# Patient Record
Sex: MALE | Race: WHITE | Employment: FULL TIME | URBAN - METROPOLITAN AREA
[De-identification: names, ages, dates, MRNs, and addresses within clinical notes are randomized per-mention and may not be internally consistent; named-entity substitution may affect disease eponyms.]

---

## 2018-10-25 ENCOUNTER — HOSPITAL ENCOUNTER (EMERGENCY)
Facility: CLINIC | Age: 53
Discharge: HOME | End: 2018-10-25
Attending: EMERGENCY MEDICINE
Payer: COMMERCIAL

## 2018-10-25 ENCOUNTER — APPOINTMENT (OUTPATIENT)
Dept: GENERAL RADIOLOGY | Facility: CLINIC | Age: 53
End: 2018-10-25
Payer: COMMERCIAL

## 2018-10-25 VITALS
BODY MASS INDEX: 35.56 KG/M2 | RESPIRATION RATE: 16 BRPM | WEIGHT: 254 LBS | HEIGHT: 71 IN | SYSTOLIC BLOOD PRESSURE: 149 MMHG | HEART RATE: 82 BPM | TEMPERATURE: 98.1 F | OXYGEN SATURATION: 93 % | DIASTOLIC BLOOD PRESSURE: 84 MMHG

## 2018-10-25 DIAGNOSIS — S56.911A FOREARM STRAIN, RIGHT, INITIAL ENCOUNTER: Primary | ICD-10-CM

## 2018-10-25 PROCEDURE — 99283 EMERGENCY DEPT VISIT LOW MDM: CPT

## 2018-10-25 PROCEDURE — 73090 X-RAY EXAM OF FOREARM: CPT

## 2018-10-25 RX ORDER — CYCLOBENZAPRINE HCL 10 MG
10 TABLET ORAL 3 TIMES DAILY PRN
COMMUNITY
Start: 2018-04-09

## 2018-10-25 RX ORDER — GABAPENTIN 300 MG/1
1 CAPSULE ORAL DAILY
COMMUNITY

## 2018-10-25 RX ORDER — ASPIRIN 81 MG/1
81 TABLET, CHEWABLE ORAL DAILY
COMMUNITY

## 2018-10-25 RX ORDER — METFORMIN HYDROCHLORIDE 500 MG/1
2 TABLET, EXTENDED RELEASE ORAL 2 TIMES DAILY
COMMUNITY

## 2018-10-25 RX ORDER — TRAMADOL HYDROCHLORIDE 50 MG/1
50 TABLET ORAL EVERY 8 HOURS PRN
COMMUNITY
Start: 2018-04-16

## 2018-10-25 RX ORDER — METOPROLOL TARTRATE 50 MG/1
50 TABLET, FILM COATED ORAL 2 TIMES DAILY
COMMUNITY

## 2018-10-25 RX ORDER — GLIMEPIRIDE 2 MG/1
2 TABLET ORAL DAILY
COMMUNITY

## 2018-10-25 RX ORDER — TRAMADOL HYDROCHLORIDE 50 MG/1
50 TABLET ORAL EVERY 6 HOURS PRN
Qty: 20 TABLET | Refills: 0 | Status: SHIPPED | OUTPATIENT
Start: 2018-10-25 | End: 2018-11-04

## 2018-10-25 RX ORDER — ATORVASTATIN CALCIUM 20 MG/1
1 TABLET, FILM COATED ORAL DAILY
COMMUNITY
Start: 2015-11-03

## 2018-10-25 ASSESSMENT — PAIN DESCRIPTION - PROGRESSION: CLINICAL_PROGRESSION: GRADUALLY WORSENING

## 2018-10-25 ASSESSMENT — ENCOUNTER SYMPTOMS
WHEEZING: 0
SHORTNESS OF BREATH: 0
SORE THROAT: 0
BACK PAIN: 0
DIARRHEA: 0
BLOOD IN STOOL: 0
NAUSEA: 0
CONSTIPATION: 0
VOMITING: 0
ABDOMINAL PAIN: 0
TROUBLE SWALLOWING: 0

## 2018-10-25 ASSESSMENT — PAIN DESCRIPTION - LOCATION: LOCATION: ARM

## 2018-10-25 ASSESSMENT — PAIN DESCRIPTION - ORIENTATION: ORIENTATION: RIGHT;LOWER

## 2018-10-25 ASSESSMENT — PAIN DESCRIPTION - ONSET: ONSET: SUDDEN

## 2018-10-25 ASSESSMENT — PAIN SCALES - GENERAL: PAINLEVEL_OUTOF10: 6

## 2018-10-25 ASSESSMENT — PAIN DESCRIPTION - PAIN TYPE: TYPE: ACUTE PAIN

## 2018-10-25 ASSESSMENT — PAIN DESCRIPTION - FREQUENCY: FREQUENCY: CONTINUOUS

## 2020-08-31 ENCOUNTER — HOSPITAL ENCOUNTER (OUTPATIENT)
Dept: SURGERY | Age: 55
Discharge: HOME | End: 2020-08-31
Payer: COMMERCIAL

## 2020-08-31 VITALS
HEIGHT: 71 IN | WEIGHT: 217 LBS | OXYGEN SATURATION: 95 % | BODY MASS INDEX: 30.38 KG/M2 | HEART RATE: 83 BPM | SYSTOLIC BLOOD PRESSURE: 124 MMHG | DIASTOLIC BLOOD PRESSURE: 69 MMHG

## 2020-08-31 PROBLEM — E11.9 CONTROLLED TYPE 2 DIABETES MELLITUS WITHOUT COMPLICATION, WITHOUT LONG-TERM CURRENT USE OF INSULIN (HCC): Chronic | Status: ACTIVE | Noted: 2020-08-31

## 2020-08-31 PROBLEM — Z72.0 TOBACCO USE: Chronic | Status: ACTIVE | Noted: 2020-08-31

## 2020-08-31 PROBLEM — R10.11 RUQ PAIN: Status: ACTIVE | Noted: 2020-08-31

## 2020-08-31 PROBLEM — I10 ESSENTIAL HYPERTENSION: Chronic | Status: ACTIVE | Noted: 2020-08-31

## 2020-08-31 PROBLEM — M62.08 DIASTASIS RECTI: Status: ACTIVE | Noted: 2020-08-31

## 2020-08-31 PROBLEM — R16.0: Status: ACTIVE | Noted: 2020-08-31

## 2020-08-31 PROBLEM — K43.2 INCISIONAL HERNIA, WITHOUT OBSTRUCTION OR GANGRENE: Chronic | Status: ACTIVE | Noted: 2020-08-31

## 2020-08-31 PROBLEM — K76.0 HEPATIC STEATOSIS: Chronic | Status: ACTIVE | Noted: 2020-08-31

## 2020-08-31 PROCEDURE — 99203 OFFICE O/P NEW LOW 30 MIN: CPT | Performed by: SURGERY

## 2020-08-31 ASSESSMENT — ENCOUNTER SYMPTOMS
ABDOMINAL PAIN: 1
VOMITING: 0
COUGH: 1
RHINORRHEA: 0
DIARRHEA: 0
NAUSEA: 0
BLOOD IN STOOL: 0
WHEEZING: 0
SHORTNESS OF BREATH: 0
CONSTIPATION: 0
SORE THROAT: 0
ABDOMINAL DISTENTION: 0

## 2020-08-31 NOTE — PROGRESS NOTES
[address] [place] [address]  Umbilical Hernia Outpatient Evaluation    PATIENT NAME: Angelita Rojas   MRN NUMBER: [id number]  YOB: 1965  PHONE NUMBER: [phone]  PRIMARY CARE PHYSICIAN: Patricia Clemons MD  TODAY'S DATE: 8/31/2020    SUBJECTIVE:     Chief Complaint: Right-sided abdominal pain. History of Present Illness: The patient is a 54 y.o. male  who presents with right-sided abdominal pain that occurs when he twists his torso to the right or if he bends over. He has noticed that there is a protrusion in the epigastrium when he sits upright, coming up from a supine position. He also has a history of prior laparoscopic cholecystectomy and there was concern of a midline epigastric hernia and umbilical hernia and so he is sent for surgery evaluation. I find that he is a type II diabetic. Recently apparently his blood sugars have been decreasing and so he has been taken off of insulin. He is still on oral hypoglycemics however. He tells me he has most recent hemoglobin A1c was 3 months ago and was over 11 however. He is also a cigarette smoker, 2 packs/day and has chronic cough. I do not have any imaging studies available. Review of Systems:  Review of Systems   Constitutional: Negative for chills, fever and unexpected weight change. HENT: Negative for congestion, rhinorrhea and sore throat. Eyes: Negative for visual disturbance. Respiratory: Positive for cough (chronic, non-productive). Negative for shortness of breath and wheezing. Cardiovascular: Negative for chest pain and palpitations. Gastrointestinal: Positive for abdominal pain (RUQ). Negative for abdominal distention, blood in stool, constipation, diarrhea, nausea and vomiting. Genitourinary: Negative for difficulty urinating, dysuria and hematuria. Musculoskeletal: Negative for arthralgias and myalgias. Neurological: Negative for tremors and weakness. Hematological: Does not bruise/bleed easily. Past Medical History:    Past Medical History:   Diagnosis Date    Asthma     [facility] obstructive [facility] disease) [facility] [facility]     Diabetes mellitus ([facility] [facility]     Hyperlipidemia     Hypertension        Past Surgical History:    Past Surgical History:   Procedure Laterality Date    CHOLECYSTECTOMY      CLAVICLE SURGERY Left     ELBOW SURGERY Bilateral     FOOT SURGERY Right     MANDIBLE SURGERY      WRIST SURGERY Right        Family History:   History reviewed. No pertinent family history. Social History:   Social History     Tobacco Use    Smoking status: Heavy Tobacco Smoker     Packs/day: 2.00     Types: Cigarettes    Smokeless tobacco: Former User   Substance Use Topics    Alcohol use: No       Medications:     Current Outpatient Medications:     Semaglutide (OZEMPIC, 0.25 OR 0.5 MG/DOSE, SC), Inject into the skin, Disp: , Rfl:     traMADol (ULTRAM) 50 MG tablet, Take 50 mg by mouth every 8 hours as needed. ., Disp: , Rfl:     metoprolol tartrate (LOPRESSOR) 50 MG tablet, Take 50 mg by mouth 2 times daily, Disp: , Rfl:     metFORMIN (GLUCOPHAGE-XR) 500 MG extended release tablet, Take 2 tablets by mouth 2 times daily, Disp: , Rfl:     atorvastatin (LIPITOR) 20 MG tablet, Take 1 tablet by mouth daily, Disp: , Rfl:     canagliflozin (INVOKANA) 100 MG TABS tablet, Take 1 tablet by mouth daily, Disp: , Rfl:     glimepiride (AMARYL) 2 MG tablet, Take 2 mg by mouth daily, Disp: , Rfl:     gabapentin (NEURONTIN) 300 MG capsule, Take 1 capsule by mouth daily. ., Disp: , Rfl:     cyclobenzaprine (FLEXERIL) 10 MG tablet, Take 10 mg by mouth 3 times daily as needed, Disp: , Rfl:     insulin glargine (BASAGLAR KWIKPEN) 100 UNIT/ML injection pen, Inject 45 Units into the skin daily, Disp: , Rfl:     aspirin (ASPIRIN 81) 81 MG chewable tablet, Take 81 mg by mouth daily, Disp: , Rfl:     insulin lispro (HUMALOG) 100 UNIT/ML injection vial, Inject into the skin 3 times daily (before meals), Disp: , Rfl:     Allergies:    Penicillins    OBJECTIVE:     Vitals:    /69   Pulse 83   Ht 5' 11\" (1.803 m)   Wt 217 lb (98.4 kg)   SpO2 95%   BMI 30.27 kg/m²  Body mass index is 30.27 kg/m². Physical Exam:    GENERAL APPEARANCE: awake, alert, mildly obese 54y.o. year old male, well-oriented, and in no acute distress  ENT: sclerae are clear and white without icterus, oropharynx shows patient is edentulous. He does not wear dentures. The oropharynx shows no erythema or masses. NECK:  is free of lymphadenopathy or thyroid masses. LUNGS: clear, equal breath sounds bilaterally without rales, rhonchi, or wheezes. CARDIO: S1 and S2 are within normal limits, regular rate and rhythm, no murmurs, no jugular venous distention and no ankle edema. ABDOMEN: the abdomen is soft. In the right upper quadrant the liver is enlarged and smooth. Palpation, especially laterally, directly on the most prominent liver edge is tender. The spleen is not palpable. There is no CVA tenderness. There is a transverse scar in the umbilicus consistent with a laparoscopic trocar site scar. There is no well-developed hernia at present however. The midline fascia in this area is soft but I do not palpate a fascial defect neither is there protrusion on upright examination in the umbilicus. The remainder the midline on upright exam shows no fascial defects. On supine exam however when the patient is asked to perform a sit up maneuver there is noticeable diastases with an elliptical bulging of the epigastrium. The separation of the rectus muscles approaches 5 cm at its widest.   INGUINAL: On upright examination no evidence of inguinal hernia is present in either groin. GENITOURINARY: Normal circumcised male. RECTAL: REJI not preformed  EXTREMITIES: no deformity or edema noted. Imaging:  No results found. ASSESSMENT:     Assessment:  1. I do not have access to any recent laboratory studies.   However I suspect that this patient has hepatic steatosis which accounts for his hepatomegaly and tenderness in the right upper quadrant. There is no evidence of any hernia or abdominal wall defect in the area of his pain complaint. I do not find clinical evidence of hernia in the umbilical area despite a scar from prior umbilical trocar placement for laparoscopic surgery. He has no symptoms in the abdominal wall midline. 2.  There is diastases of the rectus muscles. I explained to the patient and his wife what this finding represents and explained that this is different from hernia and does not require treatment. Active Hospital Problems    Diagnosis Date Noted    Diastasis recti [M62.08] 08/31/2020    Controlled type 2 diabetes mellitus without complication, without long-term current use of insulin ([facility] [address] [E11.9] 08/31/2020    Essential hypertension [I10] 08/31/2020    Tobacco use [Z72.0] 08/31/2020    Smooth hepatomegaly [R16.0] 08/31/2020    Hepatic steatosis [K76.0] 08/31/2020    RUQ pain [R10.11] 08/31/2020       PLAN:     1. I suspect the patient's symptoms are due to his hepatomegaly. The suspicion is that this is from steatosis related to his diabetes however hepatic congestion from right-sided heart failure increased right-sided pressures is possible and other underlying pathology such as possible adrenal or renal masses is not entirely ruled out. However the clinical exam is most consistent with hepatic enlargement not renal or adrenal enlargement. 2.  I have not recommended any surgical intervention. 3.  I did recommend that the patient be back in touch with his primary care physician for evaluation of the hepatomegaly and right upper quadrant pain. I recommended CT scanning and liver function testing be pursued but he will need to discuss this with his medical physician. Follow-up: PRN. See recommendations above.     Electronically signed by Genny Gao MD    This note was created with the assistance of a speech-recognition program.  Although the intention is to generate a document that actually reflects the content of the visit, no guarantees can be provided that every mistake has been identified and corrected by editing.

## 2022-03-31 ENCOUNTER — APPOINTMENT (OUTPATIENT)
Dept: CT IMAGING | Age: 57
End: 2022-03-31
Payer: COMMERCIAL

## 2022-03-31 ENCOUNTER — HOSPITAL ENCOUNTER (EMERGENCY)
Age: 57
Discharge: HOME | End: 2022-03-31
Attending: EMERGENCY MEDICINE
Payer: COMMERCIAL

## 2022-03-31 VITALS
RESPIRATION RATE: 14 BRPM | TEMPERATURE: 98 F | SYSTOLIC BLOOD PRESSURE: 179 MMHG | HEIGHT: 71 IN | WEIGHT: 218 LBS | HEART RATE: 81 BPM | OXYGEN SATURATION: 97 % | BODY MASS INDEX: 30.52 KG/M2 | DIASTOLIC BLOOD PRESSURE: 86 MMHG

## 2022-03-31 DIAGNOSIS — H66.012 NON-RECURRENT ACUTE SUPPURATIVE OTITIS MEDIA OF LEFT EAR WITH SPONTANEOUS RUPTURE OF TYMPANIC MEMBRANE: Primary | ICD-10-CM

## 2022-03-31 PROCEDURE — 96372 THER/PROPH/DIAG INJ SC/IM: CPT

## 2022-03-31 PROCEDURE — 70450 CT HEAD/BRAIN W/O DYE: CPT

## 2022-03-31 PROCEDURE — 6370000000 HC RX 637 (ALT 250 FOR IP): Performed by: PHYSICIAN ASSISTANT

## 2022-03-31 PROCEDURE — 6360000002 HC RX W HCPCS: Performed by: PHYSICIAN ASSISTANT

## 2022-03-31 PROCEDURE — 99283 EMERGENCY DEPT VISIT LOW MDM: CPT

## 2022-03-31 RX ORDER — KETOROLAC TROMETHAMINE 30 MG/ML
30 INJECTION, SOLUTION INTRAMUSCULAR; INTRAVENOUS ONCE
Status: COMPLETED | OUTPATIENT
Start: 2022-03-31 | End: 2022-03-31

## 2022-03-31 RX ORDER — CLINDAMYCIN HYDROCHLORIDE 300 MG/1
300 CAPSULE ORAL 3 TIMES DAILY
Qty: 20 CAPSULE | Refills: 0 | Status: SHIPPED | OUTPATIENT
Start: 2022-03-31 | End: 2022-04-07

## 2022-03-31 RX ORDER — CLINDAMYCIN HYDROCHLORIDE 150 MG/1
300 CAPSULE ORAL ONCE
Status: COMPLETED | OUTPATIENT
Start: 2022-03-31 | End: 2022-03-31

## 2022-03-31 RX ADMIN — KETOROLAC TROMETHAMINE 30 MG: 30 INJECTION, SOLUTION INTRAMUSCULAR at 19:07

## 2022-03-31 RX ADMIN — CLINDAMYCIN HYDROCHLORIDE 300 MG: 150 CAPSULE ORAL at 20:09

## 2022-03-31 ASSESSMENT — PAIN DESCRIPTION - PAIN TYPE: TYPE: ACUTE PAIN

## 2022-03-31 ASSESSMENT — PAIN DESCRIPTION - LOCATION: LOCATION: EAR

## 2022-03-31 ASSESSMENT — PAIN SCALES - GENERAL
PAINLEVEL_OUTOF10: 7
PAINLEVEL_OUTOF10: 7

## 2022-03-31 ASSESSMENT — PAIN DESCRIPTION - ORIENTATION: ORIENTATION: LEFT

## 2022-03-31 NOTE — ED PROVIDER NOTES
[id number] [facility] ED  [address] [facility] [address]  Phone: [phone]  Fax: [phone]      eMERGENCY dEPARTMENT eNCOUnter      Pt Name: Perez Stevens  MRN: [id number]  [place] 1965  Date of evaluation: 3/31/22      CHIEF COMPLAINT:  Chief Complaint   Patient presents with    Otalgia     left for few days       HISTORY OF PRESENT ILLNESS    Perez Stevens is a 62 y.o. male who presents with EAR PAIN:     Location/Symptom:    Left ear pain/discharge  Timing/Onset:  2 days  Context/Setting:   Nontraumatic afebrile left ear pain with bloody discharge. No present/preceding URI symptoms. No previous ENT history. No ant/post neck pain. No facial pain. Generalized HA reported. Quality:   ache  Duration:   constant  Modifying Factors:   none  Severity:   moderate    Nursing Notes were reviewed. REVIEW OF SYSTEMS       Constitutional:  Per HPI  Eyes: No visual changes. Ears:  Ear pain  Neck: No neck pain. Respiratory: Denies recent shortness of breath. Cardiac:  Denies recent chest pain. GI:  No abd pain/nausea/vomiting. : Denies dysuria. Musculoskeletal: Denies focal weakness. Neurologic: Denies headache or focal weakness. Skin:  Denies any rash. Negative in 10 essential Systems except as mentioned above and in the HPI. PAST MEDICAL HISTORY   PMH:  has a past medical history of Asthma, COPD (chronic obstructive pulmonary disease) ([address] Diabetes mellitus ([address] Hyperlipidemia, and Hypertension. Surgical History:  has a past surgical history that includes Cholecystectomy; Foot surgery (Right); Wrist surgery (Right); Elbow surgery (Bilateral); Mandible surgery; and Clavicle surgery (Left). Social History:  reports that he has been smoking cigarettes. He has been smoking about 2.00 packs per day. He has quit using smokeless tobacco. He reports that he does not drink alcohol and does not use drugs.   Family History: None  Psychiatric History: None    Allergies:is allergic to penicillins. PHYSICAL EXAM     INITIAL VITALS: BP (!) 179/86   Pulse 81   Temp 98 °F (36.7 °C) (Oral)   Resp 14   Ht 5' 11\" (1.803 m)   Wt 98.9 kg (218 lb)   SpO2 97%   BMI 30.40 kg/m²   Constitutional:  Well developed, nontoxic. Eyes:  Pupils equal/round  HENT:  Atraumatic. External ears normal,  Left TM purulent effusion, no bleeding. Canal patent. Left mastoid TTP w/o erythema/edema or warmth. Inferior aspect of mastoid most focal TTP,  Nose normal, oropharynx moist. Posterior pharynx is without erythema or exudates, airway is patent, no swelling. Neck- supple, no ant/post or periauric lymphadenopathy  Respiratory:  Clear to auscultation bilaterally with good air exchange, no W/R/R  Cardiovascular:  RRR with normal S1 and S2  Musculoskeletal:  Normal to inspection. Integument:  No rash. Neurologic:  Alert, age appropriate mentation/interaction, no focal deficits noted       DIAGNOSTIC RESULTS     RADIOLOGY:   Reviewed the radiologist:  CT HEAD WO CONTRAST   Final Result   No acute intracranial abnormality. LABS:  Labs Reviewed - No data to display  Not indicated    EMERGENCY DEPARTMENT COURSE:     1900  Mastoid/ear pain and tenderness, NIDDM2 history. AVSS less HTN. No SIRS criteria present. Nontoxic. No distress. Getting CT Head to r/o Mastoiditis. 2004  Treating for OM, has PCN allergy reportedly. PCP f/u recommended. I have reviewed the disposition diagnosis with the patient and or their family/guardian. I have answered their questions and given discharge instructions. They voiced understanding of these instructions and did not have any further questions or complaints.     Orders Placed This Encounter   Medications    ketorolac (TORADOL) injection 30 mg    clindamycin (CLEOCIN) capsule 300 mg     Order Specific Question:   Antimicrobial Indications     Answer:   Head and Neck Infection    clindamycin (CLEOCIN) 300 MG capsule Sig: Take 1 capsule by mouth 3 times daily for 7 days     Dispense:  20 capsule     Refill:  0       CONSULTS:  None      FINAL IMPRESSION      1. Non-recurrent acute suppurative otitis media of left ear with spontaneous rupture of tympanic membrane          DISPOSITION/PLAN:  DISPOSITION Decision To Discharge 03/31/2022 08:03:24 PM        PATIENT REFERRED TO:  MD Flakita Tijerina [id number]  [facility]  [phone]    Schedule an appointment as soon as possible for a visit in 3 days  for re-evaluation of your symptoms    [facility] ED  [address]   [address] [place] [address]  [phone]  Go to   for worsening of symptoms      DISCHARGE MEDICATIONS:  New Prescriptions    CLINDAMYCIN (CLEOCIN) 300 MG CAPSULE    Take 1 capsule by mouth 3 times daily for 7 days       (Please note that portions of this note were completed with a voice recognition program.  Efforts were made to edit the dictations but occasionally words are mis-transcribed.)    LISA Garcia PA-C  03/31/22 2004       Becki Pineda PA-C  03/31/22 2016

## 2022-04-02 NOTE — ED PROVIDER NOTES
Pt Name: Dania Christine  MRN: [id number]  [place] 1965  Date of evaluation: 4/2/22       Dania Christine is a 62 y.o. male who presents with Otalgia (left for few days)        Based on the medical record, the care appears appropriate. I was personally available for consultation in the Emergency Department.     Siddharth Law MD  Attending Emergency  Physician       Siddharth Law MD  04/02/22 0113

## 2022-06-16 ENCOUNTER — APPOINTMENT (OUTPATIENT)
Dept: GENERAL RADIOLOGY | Age: 57
End: 2022-06-16
Payer: COMMERCIAL

## 2022-06-16 ENCOUNTER — HOSPITAL ENCOUNTER (OUTPATIENT)
Age: 57
Setting detail: OBSERVATION
Discharge: HOME | End: 2022-06-17
Attending: EMERGENCY MEDICINE | Admitting: INTERNAL MEDICINE
Payer: COMMERCIAL

## 2022-06-16 ENCOUNTER — APPOINTMENT (OUTPATIENT)
Dept: CT IMAGING | Age: 57
End: 2022-06-16
Payer: COMMERCIAL

## 2022-06-16 DIAGNOSIS — R07.9 CHEST PAIN, UNSPECIFIED TYPE: Primary | ICD-10-CM

## 2022-06-16 LAB
ABSOLUTE EOS #: 0.1 K/UL (ref 0–0.4)
ABSOLUTE LYMPH #: 1.6 K/UL (ref 1–4.8)
ABSOLUTE MONO #: 0.6 K/UL (ref 0.1–1.2)
ANION GAP SERPL CALCULATED.3IONS-SCNC: 12 MMOL/L (ref 9–17)
BASOPHILS # BLD: 1 % (ref 0–2)
BASOPHILS ABSOLUTE: 0.1 K/UL (ref 0–0.2)
BUN BLDV-MCNC: 24 MG/DL (ref 6–20)
CALCIUM SERPL-MCNC: 10 MG/DL (ref 8.6–10.4)
CHLORIDE BLD-SCNC: 101 MMOL/L (ref 98–107)
CO2: 22 MMOL/L (ref 20–31)
CREAT SERPL-MCNC: 1.31 MG/DL (ref 0.7–1.2)
D-DIMER QUANTITATIVE: 0.72 MG/L FEU
EOSINOPHILS RELATIVE PERCENT: 1 % (ref 1–4)
GFR AFRICAN AMERICAN: >60 ML/MIN
GFR NON-AFRICAN AMERICAN: 56 ML/MIN
GFR SERPL CREATININE-BSD FRML MDRD: ABNORMAL ML/MIN/{1.73_M2}
GLUCOSE BLD-MCNC: 134 MG/DL (ref 75–110)
GLUCOSE BLD-MCNC: 187 MG/DL (ref 70–99)
HCT VFR BLD CALC: 42.2 % (ref 41–53)
HEMOGLOBIN: 14.6 G/DL (ref 13.5–17.5)
LYMPHOCYTES # BLD: 17 % (ref 24–44)
MCH RBC QN AUTO: 31.4 PG (ref 26–34)
MCHC RBC AUTO-ENTMCNC: 34.7 G/DL (ref 31–37)
MCV RBC AUTO: 90.6 FL (ref 80–100)
MONOCYTES # BLD: 6 % (ref 2–11)
PDW BLD-RTO: 12.6 % (ref 12.5–15.4)
PLATELET # BLD: 182 K/UL (ref 140–450)
PMV BLD AUTO: 8.9 FL (ref 6–12)
POTASSIUM SERPL-SCNC: 4.3 MMOL/L (ref 3.7–5.3)
PRO-BNP: 160 PG/ML
RBC # BLD: 4.66 M/UL (ref 4.5–5.9)
SEG NEUTROPHILS: 75 % (ref 36–66)
SEGMENTED NEUTROPHILS ABSOLUTE COUNT: 7 K/UL (ref 1.8–7.7)
SODIUM BLD-SCNC: 135 MMOL/L (ref 135–144)
TROPONIN, HIGH SENSITIVITY: 35 NG/L (ref 0–22)
TROPONIN, HIGH SENSITIVITY: 39 NG/L (ref 0–22)
TROPONIN, HIGH SENSITIVITY: 43 NG/L (ref 0–22)
WBC # BLD: 9.4 K/UL (ref 3.5–11)

## 2022-06-16 PROCEDURE — 99220 PR INITIAL OBSERVATION CARE/DAY 70 MINUTES: CPT | Performed by: INTERNAL MEDICINE

## 2022-06-16 PROCEDURE — 85025 COMPLETE CBC W/AUTO DIFF WBC: CPT

## 2022-06-16 PROCEDURE — 6370000000 HC RX 637 (ALT 250 FOR IP): Performed by: EMERGENCY MEDICINE

## 2022-06-16 PROCEDURE — 71045 X-RAY EXAM CHEST 1 VIEW: CPT

## 2022-06-16 PROCEDURE — 71260 CT THORAX DX C+: CPT

## 2022-06-16 PROCEDURE — 6370000000 HC RX 637 (ALT 250 FOR IP): Performed by: INTERNAL MEDICINE

## 2022-06-16 PROCEDURE — G0378 HOSPITAL OBSERVATION PER HR: HCPCS

## 2022-06-16 PROCEDURE — 6360000002 HC RX W HCPCS: Performed by: INTERNAL MEDICINE

## 2022-06-16 PROCEDURE — 6360000004 HC RX CONTRAST MEDICATION: Performed by: EMERGENCY MEDICINE

## 2022-06-16 PROCEDURE — 84484 ASSAY OF TROPONIN QUANT: CPT

## 2022-06-16 PROCEDURE — 99285 EMERGENCY DEPT VISIT HI MDM: CPT

## 2022-06-16 PROCEDURE — 83880 ASSAY OF NATRIURETIC PEPTIDE: CPT

## 2022-06-16 PROCEDURE — 80048 BASIC METABOLIC PNL TOTAL CA: CPT

## 2022-06-16 PROCEDURE — 96372 THER/PROPH/DIAG INJ SC/IM: CPT

## 2022-06-16 PROCEDURE — 82947 ASSAY GLUCOSE BLOOD QUANT: CPT

## 2022-06-16 PROCEDURE — 85379 FIBRIN DEGRADATION QUANT: CPT

## 2022-06-16 PROCEDURE — 93005 ELECTROCARDIOGRAM TRACING: CPT | Performed by: EMERGENCY MEDICINE

## 2022-06-16 PROCEDURE — 2580000003 HC RX 258: Performed by: EMERGENCY MEDICINE

## 2022-06-16 PROCEDURE — 36415 COLL VENOUS BLD VENIPUNCTURE: CPT

## 2022-06-16 RX ORDER — POTASSIUM CHLORIDE 7.45 MG/ML
10 INJECTION INTRAVENOUS PRN
Status: DISCONTINUED | OUTPATIENT
Start: 2022-06-16 | End: 2022-06-17 | Stop reason: HOSPADM

## 2022-06-16 RX ORDER — ENOXAPARIN SODIUM 100 MG/ML
40 INJECTION SUBCUTANEOUS DAILY
Status: DISCONTINUED | OUTPATIENT
Start: 2022-06-16 | End: 2022-06-17 | Stop reason: HOSPADM

## 2022-06-16 RX ORDER — METOPROLOL TARTRATE 50 MG/1
50 TABLET, FILM COATED ORAL 2 TIMES DAILY
Status: DISCONTINUED | OUTPATIENT
Start: 2022-06-16 | End: 2022-06-17 | Stop reason: HOSPADM

## 2022-06-16 RX ORDER — NITROGLYCERIN 0.4 MG/1
0.4 TABLET SUBLINGUAL EVERY 5 MIN PRN
Status: DISCONTINUED | OUTPATIENT
Start: 2022-06-16 | End: 2022-06-17 | Stop reason: HOSPADM

## 2022-06-16 RX ORDER — SODIUM CHLORIDE 0.9 % (FLUSH) 0.9 %
5-40 SYRINGE (ML) INJECTION EVERY 12 HOURS SCHEDULED
Status: DISCONTINUED | OUTPATIENT
Start: 2022-06-16 | End: 2022-06-17 | Stop reason: HOSPADM

## 2022-06-16 RX ORDER — TRAMADOL HYDROCHLORIDE 50 MG/1
50 TABLET ORAL EVERY 8 HOURS PRN
Status: DISCONTINUED | OUTPATIENT
Start: 2022-06-16 | End: 2022-06-17 | Stop reason: HOSPADM

## 2022-06-16 RX ORDER — GABAPENTIN 300 MG/1
300 CAPSULE ORAL DAILY
Status: DISCONTINUED | OUTPATIENT
Start: 2022-06-16 | End: 2022-06-17 | Stop reason: HOSPADM

## 2022-06-16 RX ORDER — ONDANSETRON 4 MG/1
4 TABLET, ORALLY DISINTEGRATING ORAL EVERY 8 HOURS PRN
Status: DISCONTINUED | OUTPATIENT
Start: 2022-06-16 | End: 2022-06-17 | Stop reason: HOSPADM

## 2022-06-16 RX ORDER — ACETAMINOPHEN 325 MG/1
650 TABLET ORAL EVERY 6 HOURS PRN
Status: DISCONTINUED | OUTPATIENT
Start: 2022-06-16 | End: 2022-06-17 | Stop reason: HOSPADM

## 2022-06-16 RX ORDER — CYCLOBENZAPRINE HCL 10 MG
10 TABLET ORAL 3 TIMES DAILY PRN
Status: DISCONTINUED | OUTPATIENT
Start: 2022-06-16 | End: 2022-06-17 | Stop reason: HOSPADM

## 2022-06-16 RX ORDER — 0.9 % SODIUM CHLORIDE 0.9 %
500 INTRAVENOUS SOLUTION INTRAVENOUS ONCE
Status: COMPLETED | OUTPATIENT
Start: 2022-06-16 | End: 2022-06-16

## 2022-06-16 RX ORDER — BUDESONIDE AND FORMOTEROL FUMARATE DIHYDRATE 160; 4.5 UG/1; UG/1
2 AEROSOL RESPIRATORY (INHALATION) 2 TIMES DAILY
Status: DISCONTINUED | OUTPATIENT
Start: 2022-06-16 | End: 2022-06-17 | Stop reason: HOSPADM

## 2022-06-16 RX ORDER — ASPIRIN 81 MG/1
324 TABLET, CHEWABLE ORAL ONCE
Status: COMPLETED | OUTPATIENT
Start: 2022-06-16 | End: 2022-06-16

## 2022-06-16 RX ORDER — ONDANSETRON 2 MG/ML
4 INJECTION INTRAMUSCULAR; INTRAVENOUS EVERY 6 HOURS PRN
Status: DISCONTINUED | OUTPATIENT
Start: 2022-06-16 | End: 2022-06-17 | Stop reason: HOSPADM

## 2022-06-16 RX ORDER — GLIPIZIDE 5 MG/1
5 TABLET ORAL
Status: DISCONTINUED | OUTPATIENT
Start: 2022-06-17 | End: 2022-06-17 | Stop reason: HOSPADM

## 2022-06-16 RX ORDER — SODIUM CHLORIDE 9 MG/ML
INJECTION, SOLUTION INTRAVENOUS PRN
Status: DISCONTINUED | OUTPATIENT
Start: 2022-06-16 | End: 2022-06-17 | Stop reason: HOSPADM

## 2022-06-16 RX ORDER — SODIUM CHLORIDE 0.9 % (FLUSH) 0.9 %
10 SYRINGE (ML) INJECTION PRN
Status: DISCONTINUED | OUTPATIENT
Start: 2022-06-16 | End: 2022-06-17 | Stop reason: HOSPADM

## 2022-06-16 RX ORDER — ALBUTEROL SULFATE 90 UG/1
2 AEROSOL, METERED RESPIRATORY (INHALATION) EVERY 6 HOURS PRN
Status: DISCONTINUED | OUTPATIENT
Start: 2022-06-16 | End: 2022-06-17 | Stop reason: HOSPADM

## 2022-06-16 RX ORDER — ASPIRIN 81 MG/1
81 TABLET, CHEWABLE ORAL DAILY
Status: DISCONTINUED | OUTPATIENT
Start: 2022-06-16 | End: 2022-06-17 | Stop reason: HOSPADM

## 2022-06-16 RX ORDER — 0.9 % SODIUM CHLORIDE 0.9 %
80 INTRAVENOUS SOLUTION INTRAVENOUS ONCE
Status: DISCONTINUED | OUTPATIENT
Start: 2022-06-16 | End: 2022-06-17 | Stop reason: HOSPADM

## 2022-06-16 RX ORDER — POTASSIUM CHLORIDE 20 MEQ/1
40 TABLET, EXTENDED RELEASE ORAL PRN
Status: DISCONTINUED | OUTPATIENT
Start: 2022-06-16 | End: 2022-06-17 | Stop reason: HOSPADM

## 2022-06-16 RX ORDER — INSULIN GLARGINE 100 [IU]/ML
45 INJECTION, SOLUTION SUBCUTANEOUS DAILY
Status: DISCONTINUED | OUTPATIENT
Start: 2022-06-16 | End: 2022-06-16

## 2022-06-16 RX ORDER — MAGNESIUM SULFATE 1 G/100ML
1000 INJECTION INTRAVENOUS PRN
Status: DISCONTINUED | OUTPATIENT
Start: 2022-06-16 | End: 2022-06-17 | Stop reason: HOSPADM

## 2022-06-16 RX ORDER — ACETAMINOPHEN 650 MG/1
650 SUPPOSITORY RECTAL EVERY 6 HOURS PRN
Status: DISCONTINUED | OUTPATIENT
Start: 2022-06-16 | End: 2022-06-17 | Stop reason: HOSPADM

## 2022-06-16 RX ORDER — ATORVASTATIN CALCIUM 10 MG/1
20 TABLET, FILM COATED ORAL DAILY
Status: DISCONTINUED | OUTPATIENT
Start: 2022-06-16 | End: 2022-06-17 | Stop reason: HOSPADM

## 2022-06-16 RX ADMIN — METOPROLOL TARTRATE 50 MG: 50 TABLET, FILM COATED ORAL at 22:30

## 2022-06-16 RX ADMIN — ASPIRIN 81 MG CHEWABLE TABLET 324 MG: 81 TABLET CHEWABLE at 13:21

## 2022-06-16 RX ADMIN — SODIUM CHLORIDE, PRESERVATIVE FREE 10 ML: 5 INJECTION INTRAVENOUS at 14:09

## 2022-06-16 RX ADMIN — SODIUM CHLORIDE 500 ML: 9 INJECTION, SOLUTION INTRAVENOUS at 14:20

## 2022-06-16 RX ADMIN — ENOXAPARIN SODIUM 40 MG: 100 INJECTION SUBCUTANEOUS at 17:40

## 2022-06-16 RX ADMIN — IOPAMIDOL 75 ML: 755 INJECTION, SOLUTION INTRAVENOUS at 14:08

## 2022-06-16 RX ADMIN — Medication 80 ML: at 14:09

## 2022-06-16 ASSESSMENT — PAIN SCALES - GENERAL
PAINLEVEL_OUTOF10: 2
PAINLEVEL_OUTOF10: 5
PAINLEVEL_OUTOF10: 3
PAINLEVEL_OUTOF10: 3

## 2022-06-16 ASSESSMENT — PAIN - FUNCTIONAL ASSESSMENT
PAIN_FUNCTIONAL_ASSESSMENT: 0-10
PAIN_FUNCTIONAL_ASSESSMENT: ACTIVITIES ARE NOT PREVENTED
PAIN_FUNCTIONAL_ASSESSMENT: 0-10

## 2022-06-16 ASSESSMENT — PAIN DESCRIPTION - DESCRIPTORS
DESCRIPTORS: THROBBING
DESCRIPTORS: POUNDING
DESCRIPTORS: THROBBING

## 2022-06-16 ASSESSMENT — PAIN DESCRIPTION - LOCATION
LOCATION: HEAD
LOCATION: HEAD
LOCATION: CHEST
LOCATION: HEAD

## 2022-06-16 ASSESSMENT — PAIN DESCRIPTION - ORIENTATION: ORIENTATION: RIGHT

## 2022-06-16 ASSESSMENT — PAIN DESCRIPTION - FREQUENCY: FREQUENCY: CONTINUOUS

## 2022-06-16 NOTE — LETTER
Sandy Nicholson was seen and treated in our emergency department on 6/16/2022-6/17/22  He may return to work as scheduled. If you have any questions or concerns, please don't hesitate to call.       [phone]

## 2022-06-16 NOTE — PLAN OF CARE
Problem: Discharge Planning  Goal: Discharge to home or other facility with appropriate resources  Outcome: Progressing  Flowsheets (Taken 6/16/2022 1720)  Discharge to home or other facility with appropriate resources:   Identify barriers to discharge with patient and caregiver   Arrange for needed discharge resources and transportation as appropriate   Identify discharge learning needs (meds, wound care, etc)     Problem: Pain  Goal: Verbalizes/displays adequate comfort level or baseline comfort level  Outcome: Progressing     Problem: Safety - Adult  Goal: Free from fall injury  Outcome: Progressing

## 2022-06-16 NOTE — ED NOTES
Contacted floor for bed assignment- RN taking pt states she will call back for report     Merlyn Wahl RN  06/16/22 5018

## 2022-06-16 NOTE — ED PROVIDER NOTES
[id number] [facility] ED  [address] \[facility]\"" [address]  Phone: [phone]  Fax: [address]      Pt Name: Rowena Perez  MRN: [id number]  [place] 1965  Date of evaluation: 6/16/2022    CHIEF COMPLAINT       Chief Complaint   Patient presents with    Chest Pain       HISTORY OF PRESENT ILLNESS    Rowena Perez is a 62 y.o. male who presents to the emergency department complaining of left precordial chest pain with radiation to his left arm that started suddenly just prior to arrival.  He says that he has been dealing with chest pain off and on for the past couple weeks. Nothing really makes it better or worse. He denies any weakness. He does have a history of COPD chronic cough no fevers or chills. Denies heart cath in the past he says he has had a stress test.  History of hypertension hyperlipidemia COPD and diabetes. He rates his pain currently 5 out of 10. REVIEW OF SYSTEMS       Constitutional: No fevers or chills   HEENT: No sore throat, rhinorrhea, or earache   Eyes: No blurry vision or double vision no drainage   Cardiovascular: Positive chest pain no tachycardia  Respiratory: No wheezing or shortness of breath chronic cough   Gastrointestinal: No nausea, vomiting, diarrhea, constipation, or abdominal pain   : No hematuria or dysuria   Musculoskeletal: No swelling or pain   Skin: No rash   Neurological: No focal neurologic complaints, paresthesias, weakness, or headache     PAST MEDICAL HISTORY    has a past medical history of Asthma, COPD (chronic obstructive pulmonary disease) ([address] Diabetes mellitus ([address] Hyperlipidemia, and Hypertension. SURGICAL HISTORY      has a past surgical history that includes Cholecystectomy; Foot surgery (Right); Wrist surgery (Right); Elbow surgery (Bilateral); Mandible surgery; and Clavicle surgery (Left).     CURRENT MEDICATIONS       Previous Medications    ASPIRIN (ASPIRIN 81) 81 MG CHEWABLE TABLET Take 81 mg by mouth daily    ATORVASTATIN (LIPITOR) 20 MG TABLET    Take 1 tablet by mouth daily    CANAGLIFLOZIN (INVOKANA) 100 MG TABS TABLET    Take 1 tablet by mouth daily    CYCLOBENZAPRINE (FLEXERIL) 10 MG TABLET    Take 10 mg by mouth 3 times daily as needed    GABAPENTIN (NEURONTIN) 300 MG CAPSULE    Take 1 capsule by mouth daily. Maraltagracia Omega GLIMEPIRIDE (AMARYL) 2 MG TABLET    Take 2 mg by mouth daily    INSULIN GLARGINE (BASAGLAR KWIKPEN) 100 UNIT/ML INJECTION PEN    Inject 45 Units into the skin daily    INSULIN LISPRO (HUMALOG) 100 UNIT/ML INJECTION VIAL    Inject into the skin 3 times daily (before meals)    METFORMIN (GLUCOPHAGE-XR) 500 MG EXTENDED RELEASE TABLET    Take 2 tablets by mouth 2 times daily    METOPROLOL TARTRATE (LOPRESSOR) 50 MG TABLET    Take 50 mg by mouth 2 times daily    SEMAGLUTIDE (OZEMPIC, 0.25 OR 0.5 MG/DOSE, SC)    Inject into the skin    TRAMADOL (ULTRAM) 50 MG TABLET    Take 50 mg by mouth every 8 hours as needed. .       ALLERGIES     is allergic to penicillins. FAMILY HISTORY     has no family status information on file. family history is not on file. SOCIAL HISTORY      reports that he has been smoking cigarettes. He has been smoking about 2.00 packs per day. He has quit using smokeless tobacco. He reports that he does not drink alcohol and does not use drugs.     PHYSICAL EXAM       /67   Pulse 75   Temp 98.2 °F (36.8 °C) (Oral)   Resp 17   Ht 5' 11\" (1.803 m)   Wt 103.4 kg (228 lb)   SpO2 94%   BMI 31.80 kg/m²       Constitutional: Alert, oriented x3, nontoxic, answering questions appropriately, acting properly for age, in no acute distress   HEENT: Extraocular muscles intact,  Neck: Trachea midline   Cardiovascular: Regular rhythm and tachycardic no murmurs   Respiratory: Diminished to auscultation bilaterally no wheezes, positive rhonchi with coughing, rales, no respiratory distress no tachypnea no retractions no hypoxia no subcutaneous emphysema  Gastrointestinal: Soft, nontender, nondistended, positive bowel sounds. No rebound, rigidity, or guarding. No abdominal bruit no pulsatile mass  Musculoskeletal: No extremity pain or swelling no calf tenderness or asymmetry. Neurologic: Moving all 4 extremities without difficulty there are no gross focal neurologic deficits   Skin: Warm and dry     DIFFERENTIAL DIAGNOSIS/ MDM:     IV labs. EKG. Chest x-ray. Rule out pneumothorax. Aspirin. HEART Risk Score:   1 = History   Highly Suspicious   2    Moderately Suspicious   1    Slight Suspicious  0  1 = EKG  Significant ST Depression 2    Nonspecific   1    Normal    0  1 = Age > or = 65   2    > 45 to < 65   1    < or = 45   0  2 = Risk Factors > or = 3   2    1 or 2    1    0    0  1 = Troponin > or = 3 times normal limit 2    > 1 and < 3 times limit  1    Normal    0  6 = Score  0 - 3    Low Risk     4 - 6    Moderate risk     7 - 10    High Risk  * Risk Factors include: Diabetes, Tobacco use, Hypertension, Hyperlipidemia, Family History of CAD and Obesity    PERC Criteria     y = Age      > or = 48  y = Heart Rate     > or = 100  n = Pulse Oximetry     < or = 95%  n = Previous History of DVT   Yes / No  n = Trauma or Surgery within 4 Weeks Yes / No  n = Hemoptysis    Yes / No  n = Exogenous Estrogen use  Yes / No  n = Unilateral Leg Swelling   Yes / No    Risk Criteria for Thoracic Aortic Dissection:     n = Sudden Onset, Maximal at Onset, with radiation to back  n = Neurologic Deficits with Chest Pain  n = Connective Tissue Disorder such as Marfan's or Etienne-Danlos  n = History of Aortic Valvular Disease  n = Pregnancy  n = Family History of Dissection  Elevated D-Dimer with any of the above      DIAGNOSTIC RESULTS     EKG: All EKG's are interpreted by the Emergency Department Physician who either signs or Co-signs this chart in the absence of a cardiologist.    1316 sinus tachycardia rate 101    no ST elevations.   Right bundle branch block pattern. Occasional PVC. No comparison EKG available. 1539 sinus rhythm rate 79    no acute ST elevations. Right bundle branch block.     Not indicated unless otherwise documented above    LABS:  Results for orders placed or performed during the hospital encounter of 06/16/22   CBC with Auto Differential   Result Value Ref Range    WBC 9.4 3.5 - 11.0 k/uL    RBC 4.66 4.5 - 5.9 m/uL    Hemoglobin 14.6 13.5 - 17.5 g/dL    Hematocrit 42.2 41 - 53 %    MCV 90.6 80 - 100 fL    MCH 31.4 26 - 34 pg    MCHC 34.7 31 - 37 g/dL    RDW 12.6 12.5 - 15.4 %    Platelets 174 259 - 600 k/uL    MPV 8.9 6.0 - 12.0 fL    Seg Neutrophils 75 (H) 36 - 66 %    Lymphocytes 17 (L) 24 - 44 %    Monocytes 6 2 - 11 %    Eosinophils % 1 1 - 4 %    Basophils 1 0 - 2 %    Segs Absolute 7.00 1.8 - 7.7 k/uL    Absolute Lymph # 1.60 1.0 - 4.8 k/uL    Absolute Mono # 0.60 0.1 - 1.2 k/uL    Absolute Eos # 0.10 0.0 - 0.4 k/uL    Basophils Absolute 0.10 0.0 - 0.2 k/uL   Basic Metabolic Panel   Result Value Ref Range    Glucose 187 (H) 70 - 99 mg/dL    BUN 24 (H) 6 - 20 mg/dL    CREATININE 1.31 (H) 0.70 - 1.20 mg/dL    Calcium 10.0 8.6 - 10.4 mg/dL    Sodium 135 135 - 144 mmol/L    Potassium 4.3 3.7 - 5.3 mmol/L    Chloride 101 98 - 107 mmol/L    CO2 22 20 - 31 mmol/L    Anion Gap 12 9 - 17 mmol/L    GFR Non-African American 56 (L) >60 mL/min    GFR African American >60 >60 mL/min    GFR Comment         Troponin   Result Value Ref Range    Troponin, High Sensitivity 43 (H) 0 - 22 ng/L   Troponin   Result Value Ref Range    Troponin, High Sensitivity 39 (H) 0 - 22 ng/L   D-Dimer, Quantitative   Result Value Ref Range    D-Dimer, Quant 0.72 mg/L FEU   Brain Natriuretic Peptide   Result Value Ref Range    Pro- <300 pg/mL       Not indicated unless otherwise documented above    RADIOLOGY:   I reviewed the radiologist interpretations:    CT CHEST PULMONARY EMBOLISM W CONTRAST   Final Result   No evidence of pulmonary embolism or acute pulmonary abnormality. Few scattered ground-glass nodules with the largest in the left lung apex   measuring 7 mm. Follow-up per the [facility] [address] criteria is given below. Fleischner Society guidelines for follow-up and management of incidentally   detected pulmonary nodules:      Multiple Solid Nodules:      Nodule size equals 6-8 mm   In a low-risk patient, CT at 3-6 months, then consider CT at 18-24 months. In a high-risk patient, CT at 3-6 months, then CT at 18-24 months. - Low risk patients include individuals with minimal or absent history of   smoking and other known risk factors. - High risk patients include individuals with a history or smoking or known   risk factors. Radiology 2017 http://pubs. rsna.org/doi/full/10.1148/radiol. 4493124716         XR CHEST PORTABLE   Final Result   No acute cardiopulmonary disease. Not indicated unless otherwise documented above    EMERGENCY DEPARTMENT COURSE:     The patient was given the following medications:  Orders Placed This Encounter   Medications    aspirin chewable tablet 324 mg    0.9 % sodium chloride bolus    0.9 % sodium chloride bolus    iopamidol (ISOVUE-370) 76 % injection 75 mL    sodium chloride flush 0.9 % injection 10 mL        Vitals:   -------------------------  /67   Pulse 75   Temp 98.2 °F (36.8 °C) (Oral)   Resp 17   Ht 5' 11\" (1.803 m)   Wt 103.4 kg (228 lb)   SpO2 94%   BMI 31.80 kg/m²     1:25 PM chest x-ray portable no pneumothorax. EKG no ST elevations. Awaiting labs. 3 PM chest pain free. Has some tingling to his fingertips on the left otherwise feels fine. Troponin elevated at 43. Elevated D-dimer prompted a CT of the chest which was negative for pulmonary embolism. Awaiting second troponin and EKG. 4 PM sleeping easily awakened. Repeat troponin down to 38. Denies chest pain. Repeat EKG no ST elevations.   Will admit for chest pain rule out    4:40 PM Source MDx message sent to Dr. Snow admission orders initiated    CRITICAL CARE:    None    CONSULTS:    None    PROCEDURES:    None      OARRS Report if indicated             FINAL IMPRESSION      1. Chest pain, unspecified type          DISPOSITION/PLAN   DISPOSITION Admitted 06/16/2022 04:27:32 PM        CONDITION ON DISPOSITION: STABLE       PATIENT REFERRED TO:  No follow-up provider specified.     DISCHARGE MEDICATIONS:  New Prescriptions    No medications on file       (Please note that portions of this note were completed with a voice recognition program.  Efforts were made to edit the dictations but occasionally words are mis-transcribed.)    Jason Linares DO   Attending Emergency Physician      Jason Linares DO  06/16/22 5023

## 2022-06-16 NOTE — H&P
[facility]  Office: [address], DO, Tegan Smoker, DO, Alizehugo [facility], DO, Shannon Mccallum Blood, DO, Colonel Bonita MD, Joseph Centeno MD, Esther Caicedo MD, Ninfa Villarreal MD, David Rodríguez MD, Reece Morgan MD, Noris Carson MD, Yonis Moreau, DO, Izabella Mccartney MD,  Chevy Hoyos, DO, Wali Dunbar MD, Rebekah Calzada MD, Sonali Lane MD, Jose Godoy, DO, Edson Hinkle MD, Kalyan Fitch MD, Monet Coyle, DO, Kira Concepcion MD, Felipe Lozano [facility], [facility], CNP, Ayaz Senior, CNP, Kike Cristobal, CNP, Billy Ervin, CNP, Lisbeth Kathleen, CNP, Karla Roblero PA-C, Cat Mars, DNP, Cynthia Lainez, CNP, Ernestina Norris, CNP, Compa Basurto, CNP, Lisa Anthony, CNS, Tiarra Bonilla, DNP, Dania Brown, CNP, Juju Cavanaugh, CNP, Bud Echols, [facility]   [address] [facility]    HISTORY AND PHYSICAL EXAMINATION            Date:   6/16/2022  Patient name:  Colonel Marcus  Date of admission:  6/16/2022  1:09 PM  MRN:   [id number]  Account:  [de-identified]  YOB: 1965  PCP:    Giacomo Benson MD  Room:   [address] [place] [address]  Code Status:    Full Code    Chief Complaint:     Chief Complaint   Patient presents with    Chest Pain       History Obtained From:     patient, electronic medical record    History of Present Illness:     Colonel Marcus is a 62 y.o. Non- / non  male who presents with Chest Pain   and is admitted to the hospital for the management of Chest pain at rest.    This 62 yom presents with left sided sharp cp. 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 He had pain that lasted 2-4 minutes.   When he got up, he felt dizzy and had blurred vision and was more sob than usual.  Feels fine now    Past Medical History:     Past Medical History:   Diagnosis Date    Asthma     [facility] obstructive [facility] disease) [facility]     Diabetes mellitus ([facility]     Hyperlipidemia     Hypertension         Past Surgical History:     Past Surgical History:   Procedure Laterality Date    CHOLECYSTECTOMY      CLAVICLE SURGERY Left     ELBOW SURGERY Bilateral     FOOT SURGERY Right     MANDIBLE SURGERY      WRIST SURGERY Right         Medications Prior to Admission:     Prior to Admission medications    Medication Sig Start Date End Date Taking? Authorizing Provider   Semaglutide (OZEMPIC, 0.25 OR 0.5 MG/DOSE, SC) Inject into the skin    Historical Provider, MD   traMADol (ULTRAM) 50 MG tablet Take 50 mg by mouth every 8 hours as needed. . 4/16/18   Historical Provider, MD   metoprolol tartrate (LOPRESSOR) 50 MG tablet Take 50 mg by mouth 2 times daily    Historical Provider, MD   metFORMIN (GLUCOPHAGE-XR) 500 MG extended release tablet Take 2 tablets by mouth 2 times daily    Historical Provider, MD   atorvastatin (LIPITOR) 20 MG tablet Take 1 tablet by mouth daily 11/3/15   Historical Provider, MD   canagliflozin (INVOKANA) 100 MG TABS tablet Take 1 tablet by mouth daily    Historical Provider, MD   glimepiride (AMARYL) 2 MG tablet Take 2 mg by mouth daily    Historical Provider, MD   gabapentin (NEURONTIN) 300 MG capsule Take 1 capsule by mouth daily. Jeanine Roque     Historical Provider, MD   cyclobenzaprine (FLEXERIL) 10 MG tablet Take 10 mg by mouth 3 times daily as needed 4/9/18   Historical Provider, MD   insulin glargine (BASAGLAR KWIKPEN) 100 UNIT/ML injection pen Inject 45 Units into the skin daily    Historical Provider, MD   aspirin (ASPIRIN 81) 81 MG chewable tablet Take 81 mg by mouth daily    Historical Provider, MD   insulin lispro (HUMALOG) 100 UNIT/ML injection vial Inject into the skin 3 times daily (before meals)  Patient not taking: Reported on 6/16/2022    Historical Provider, MD        Allergies:     Penicillins    Social History:     Tobacco:    reports that he has been smoking cigarettes. He has been smoking about 2.00 packs per day. He has quit using smokeless tobacco.  Alcohol:      reports no history of alcohol use. Drug Use:  reports no history of drug use. Family History:     Family History   Problem Relation Age of Onset    Heart Failure Mother     Cancer Father     Coronary Art Dis Paternal Grandmother     Coronary Art Dis Paternal Grandfather        Review of Systems:     Positive and Negative as described in HPI.     CONSTITUTIONAL:  negative for fevers, chills, sweats, fatigue, weight loss  HEENT:  negative for hearing changes, runny nose, throat pain  RESPIRATORY:  negative for congestion, wheezing  CARDIOVASCULAR:  negative for palpitations  GASTROINTESTINAL:  negative for constipation, change in bowel habits, abdominal pain   GENITOURINARY:  negative for difficulty of urination, burning with urination, frequency   INTEGUMENT:  negative for rash, skin lesions, easy bruising   HEMATOLOGIC/LYMPHATIC:  negative for swelling/edema   ALLERGIC/IMMUNOLOGIC:  negative for urticaria , itching  ENDOCRINE:  negative increase in drinking, increase in urination, hot or cold intolerance  MUSCULOSKELETAL:  negative joint pains, muscle aches, swelling of joints  NEUROLOGICAL:  negative for pain in extremities  BEHAVIOR/PSYCH:  negative for depression, anxiety    Physical Exam:   /70   Pulse 75   Temp 98.2 °F (36.8 °C) (Oral)   Resp 21   Ht 5' 11\" (1.803 m)   Wt 228 lb (103.4 kg)   SpO2 93%   BMI 31.80 kg/m²   Temp (24hrs), Av.2 °F (36.8 °C), Min:98.2 °F (36.8 °C), Max:98.2 °F (36.8 °C)    Recent Labs     22  1713   POCGLU 134*       Intake/Output Summary (Last 24 hours) at 2022 1746  Last data filed at 2022 1530  Gross per 24 hour   Intake 500 ml   Output --   Net 500 ml       General Appearance:  alert, well appearing, and in no acute distress  Mental status: oriented to person, place, and time  Head:  normocephalic, atraumatic  Eye: no icterus, redness, pupils equal and reactive, extraocular eye movements intact, conjunctiva clear  Ear: normal external ear, no discharge, hearing intact  Nose:  no drainage noted  Mouth: mucous membranes moist  Neck: supple, no carotid bruits, thyroid not palpable  Lungs: Bilateral equal air entry, clear to ausculation, no wheezing, rales or rhonchi, normal effort  Cardiovascular: normal rate, regular rhythm, no murmur, gallop, rub  Abdomen: Soft, nontender, nondistended, normal bowel sounds, no hepatomegaly or splenomegaly  Neurologic: There are no new focal motor or sensory deficits, normal muscle tone and bulk, no abnormal sensation, normal speech, cranial nerves II through XII grossly intact  Skin: No gross lesions, rashes, bruising or bleeding on exposed skin area  Extremities:  peripheral pulses palpable, no pedal edema or calf pain with palpation  Psych: normal affect     Investigations:      Laboratory Testing:  Recent Results (from the past 24 hour(s))   CBC with Auto Differential    Collection Time: 06/16/22  1:18 PM   Result Value Ref Range    WBC 9.4 3.5 - 11.0 k/uL    RBC 4.66 4.5 - 5.9 m/uL    Hemoglobin 14.6 13.5 - 17.5 g/dL    Hematocrit 42.2 41 - 53 %    MCV 90.6 80 - 100 fL    MCH 31.4 26 - 34 pg    MCHC 34.7 31 - 37 g/dL    RDW 12.6 12.5 - 15.4 %    Platelets 999 243 - 816 k/uL    MPV 8.9 6.0 - 12.0 fL    Seg Neutrophils 75 (H) 36 - 66 %    Lymphocytes 17 (L) 24 - 44 %    Monocytes 6 2 - 11 %    Eosinophils % 1 1 - 4 %    Basophils 1 0 - 2 %    Segs Absolute 7.00 1.8 - 7.7 k/uL    Absolute Lymph # 1.60 1.0 - 4.8 k/uL    Absolute Mono # 0.60 0.1 - 1.2 k/uL    Absolute Eos # 0.10 0.0 - 0.4 k/uL    Basophils Absolute 0.10 0.0 - 0.2 k/uL   Basic Metabolic Panel    Collection Time: 06/16/22  1:18 PM   Result Value Ref Range    Glucose 187 (H) 70 - 99 mg/dL    BUN 24 (H) 6 - 20 mg/dL    CREATININE 1.31 (H) 0.70 - 1.20 mg/dL    Calcium 10.0 8.6 - 10.4 mg/dL    Sodium 135 135 - 144 mmol/L    Potassium 4.3 3.7 - 5.3 mmol/L    Chloride 101 98 - 107 mmol/L    CO2 22 20 - 31 mmol/L    Anion Gap 12 9 - 17 mmol/L    GFR Non-African American 56 (L) >60 mL/min    GFR African American >60 >60 mL/min    GFR Comment         Troponin    Collection Time: 06/16/22  1:18 PM   Result Value Ref Range    Troponin, High Sensitivity 43 (H) 0 - 22 ng/L   D-Dimer, Quantitative    Collection Time: 06/16/22  1:18 PM   Result Value Ref Range    D-Dimer, Quant 0.72 mg/L FEU   Brain Natriuretic Peptide    Collection Time: 06/16/22  1:18 PM   Result Value Ref Range    Pro- <300 pg/mL   Troponin    Collection Time: 06/16/22  3:40 PM   Result Value Ref Range    Troponin, High Sensitivity 39 (H) 0 - 22 ng/L   POC Glucose Fingerstick    Collection Time: 06/16/22  5:13 PM   Result Value Ref Range    POC Glucose 134 (H) 75 - 110 mg/dL       Imaging/Diagnostics:  XR CHEST PORTABLE    Result Date: 6/16/2022  No acute cardiopulmonary disease. CT CHEST PULMONARY EMBOLISM W CONTRAST    Result Date: 6/16/2022  No evidence of pulmonary embolism or acute pulmonary abnormality. Few scattered ground-glass nodules with the largest in the left lung apex measuring 7 mm. Follow-up per the [address] [place] [address] criteria is given below. Fleischner Society guidelines for follow-up and management of incidentally detected pulmonary nodules: Multiple Solid Nodules: Nodule size equals 6-8 mm In a low-risk patient, CT at 3-6 months, then consider CT at 18-24 months. In a high-risk patient, CT at 3-6 months, then CT at 18-24 months. - Low risk patients include individuals with minimal or absent history of smoking and other known risk factors. - High risk patients include individuals with a history or smoking or known risk factors. Radiology 2017 http://pubs. rsna.org/doi/full/10.1148/radiol. 2484534474       Assessment :      Hospital Problems           Last Modified POA    * (Principal) Chest pain at rest 6/16/2022 Yes    Controlled type 2 diabetes mellitus without complication, without long-term current use of insulin ([facility] [address] [facility] (Chronic) 6/16/2022 Yes    Essential hypertension (Chronic) 6/16/2022 Yes    Tobacco use (Chronic) 6/16/2022 Yes          Plan:     Patient status observation in the Progressive Unit/Step down    1. Repeat troponin for trend  2. No evidence of ami-no need for heparin drip or full dose lovenox; just prophylactic dose  3. Smoking cessation  4. lexiscan stress test in am  5. Monitor/control glucose and bp  6. Relatively low suspicion of ischemic cp but does have numerous risk factors  7.  D/w wife    Consultations:   None        Ryley Damian Blood, DO  6/16/2022  5:46 PM    Copy sent to Dr. Faby Martinez MD

## 2022-06-17 ENCOUNTER — APPOINTMENT (OUTPATIENT)
Dept: NUCLEAR MEDICINE | Age: 57
End: 2022-06-17
Payer: COMMERCIAL

## 2022-06-17 VITALS
SYSTOLIC BLOOD PRESSURE: 174 MMHG | BODY MASS INDEX: 30.96 KG/M2 | DIASTOLIC BLOOD PRESSURE: 82 MMHG | HEART RATE: 69 BPM | OXYGEN SATURATION: 92 % | TEMPERATURE: 97.6 F | WEIGHT: 221.12 LBS | HEIGHT: 71 IN | RESPIRATION RATE: 17 BRPM

## 2022-06-17 LAB
ALBUMIN SERPL-MCNC: 4.1 G/DL (ref 3.5–5.2)
ALBUMIN/GLOBULIN RATIO: 1.5 (ref 1–2.5)
ALP BLD-CCNC: 123 U/L (ref 40–129)
ALT SERPL-CCNC: 15 U/L (ref 5–41)
ANION GAP SERPL CALCULATED.3IONS-SCNC: 10 MMOL/L (ref 9–17)
AST SERPL-CCNC: 15 U/L
BILIRUB SERPL-MCNC: 0.5 MG/DL (ref 0.3–1.2)
BUN BLDV-MCNC: 25 MG/DL (ref 6–20)
CALCIUM SERPL-MCNC: 10.1 MG/DL (ref 8.6–10.4)
CHLORIDE BLD-SCNC: 106 MMOL/L (ref 98–107)
CHOLESTEROL/HDL RATIO: 2.9
CHOLESTEROL: 108 MG/DL
CO2: 23 MMOL/L (ref 20–31)
CREAT SERPL-MCNC: 0.86 MG/DL (ref 0.7–1.2)
EKG ATRIAL RATE: 101 BPM
EKG ATRIAL RATE: 79 BPM
EKG P AXIS: 67 DEGREES
EKG P AXIS: 77 DEGREES
EKG P-R INTERVAL: 152 MS
EKG P-R INTERVAL: 160 MS
EKG Q-T INTERVAL: 376 MS
EKG Q-T INTERVAL: 412 MS
EKG QRS DURATION: 136 MS
EKG QRS DURATION: 140 MS
EKG QTC CALCULATION (BAZETT): 472 MS
EKG QTC CALCULATION (BAZETT): 487 MS
EKG R AXIS: 16 DEGREES
EKG R AXIS: 64 DEGREES
EKG T AXIS: 37 DEGREES
EKG T AXIS: 57 DEGREES
EKG VENTRICULAR RATE: 101 BPM
EKG VENTRICULAR RATE: 79 BPM
GFR AFRICAN AMERICAN: >60 ML/MIN
GFR NON-AFRICAN AMERICAN: >60 ML/MIN
GFR SERPL CREATININE-BSD FRML MDRD: ABNORMAL ML/MIN/{1.73_M2}
GLUCOSE BLD-MCNC: 151 MG/DL (ref 70–99)
GLUCOSE BLD-MCNC: 188 MG/DL (ref 75–110)
HCT VFR BLD CALC: 42.6 % (ref 41–53)
HDLC SERPL-MCNC: 37 MG/DL
HEMOGLOBIN: 14.5 G/DL (ref 13.5–17.5)
LDL CHOLESTEROL: 28 MG/DL (ref 0–130)
LV EF: 53 %
LVEF MODALITY: NORMAL
MAGNESIUM: 1.7 MG/DL (ref 1.6–2.6)
MCH RBC QN AUTO: 31.1 PG (ref 26–34)
MCHC RBC AUTO-ENTMCNC: 34.1 G/DL (ref 31–37)
MCV RBC AUTO: 91.4 FL (ref 80–100)
PDW BLD-RTO: 12.9 % (ref 12.5–15.4)
PLATELET # BLD: 178 K/UL (ref 140–450)
PMV BLD AUTO: 9.8 FL (ref 6–12)
POTASSIUM SERPL-SCNC: 4.7 MMOL/L (ref 3.7–5.3)
RBC # BLD: 4.67 M/UL (ref 4.5–5.9)
SODIUM BLD-SCNC: 139 MMOL/L (ref 135–144)
TOTAL PROTEIN: 6.9 G/DL (ref 6.4–8.3)
TRIGL SERPL-MCNC: 215 MG/DL
WBC # BLD: 7.9 K/UL (ref 3.5–11)

## 2022-06-17 PROCEDURE — 2500000003 HC RX 250 WO HCPCS: Performed by: NURSE PRACTITIONER

## 2022-06-17 PROCEDURE — 99217 PR OBSERVATION CARE DISCHARGE MANAGEMENT: CPT | Performed by: INTERNAL MEDICINE

## 2022-06-17 PROCEDURE — 93017 CV STRESS TEST TRACING ONLY: CPT

## 2022-06-17 PROCEDURE — 96375 TX/PRO/DX INJ NEW DRUG ADDON: CPT

## 2022-06-17 PROCEDURE — 85027 COMPLETE CBC AUTOMATED: CPT

## 2022-06-17 PROCEDURE — 94640 AIRWAY INHALATION TREATMENT: CPT

## 2022-06-17 PROCEDURE — 3430000000 HC RX DIAGNOSTIC RADIOPHARMACEUTICAL: Performed by: INTERNAL MEDICINE

## 2022-06-17 PROCEDURE — 94760 N-INVAS EAR/PLS OXIMETRY 1: CPT

## 2022-06-17 PROCEDURE — 80053 COMPREHEN METABOLIC PANEL: CPT

## 2022-06-17 PROCEDURE — 83735 ASSAY OF MAGNESIUM: CPT

## 2022-06-17 PROCEDURE — 96372 THER/PROPH/DIAG INJ SC/IM: CPT

## 2022-06-17 PROCEDURE — 82947 ASSAY GLUCOSE BLOOD QUANT: CPT

## 2022-06-17 PROCEDURE — 78452 HT MUSCLE IMAGE SPECT MULT: CPT

## 2022-06-17 PROCEDURE — 2580000003 HC RX 258: Performed by: INTERNAL MEDICINE

## 2022-06-17 PROCEDURE — G0378 HOSPITAL OBSERVATION PER HR: HCPCS

## 2022-06-17 PROCEDURE — 96374 THER/PROPH/DIAG INJ IV PUSH: CPT

## 2022-06-17 PROCEDURE — 6370000000 HC RX 637 (ALT 250 FOR IP): Performed by: NURSE PRACTITIONER

## 2022-06-17 PROCEDURE — A9500 TC99M SESTAMIBI: HCPCS | Performed by: INTERNAL MEDICINE

## 2022-06-17 PROCEDURE — 6370000000 HC RX 637 (ALT 250 FOR IP): Performed by: INTERNAL MEDICINE

## 2022-06-17 PROCEDURE — 6360000002 HC RX W HCPCS: Performed by: INTERNAL MEDICINE

## 2022-06-17 PROCEDURE — 6360000002 HC RX W HCPCS: Performed by: NURSE PRACTITIONER

## 2022-06-17 PROCEDURE — 94664 DEMO&/EVAL PT USE INHALER: CPT

## 2022-06-17 PROCEDURE — 80061 LIPID PANEL: CPT

## 2022-06-17 PROCEDURE — 36415 COLL VENOUS BLD VENIPUNCTURE: CPT

## 2022-06-17 RX ORDER — ATROPINE SULFATE 0.1 MG/ML
0.5 INJECTION INTRAVENOUS EVERY 5 MIN PRN
Status: DISCONTINUED | OUTPATIENT
Start: 2022-06-17 | End: 2022-06-17 | Stop reason: ALTCHOICE

## 2022-06-17 RX ORDER — SODIUM CHLORIDE 0.9 % (FLUSH) 0.9 %
5-40 SYRINGE (ML) INJECTION PRN
Status: DISCONTINUED | OUTPATIENT
Start: 2022-06-17 | End: 2022-06-17 | Stop reason: ALTCHOICE

## 2022-06-17 RX ORDER — HYDRALAZINE HYDROCHLORIDE 20 MG/ML
10 INJECTION INTRAMUSCULAR; INTRAVENOUS ONCE
Status: COMPLETED | OUTPATIENT
Start: 2022-06-17 | End: 2022-06-17

## 2022-06-17 RX ORDER — NITROGLYCERIN 0.4 MG/1
0.4 TABLET SUBLINGUAL EVERY 5 MIN PRN
Status: DISCONTINUED | OUTPATIENT
Start: 2022-06-17 | End: 2022-06-17 | Stop reason: ALTCHOICE

## 2022-06-17 RX ORDER — SODIUM CHLORIDE 9 MG/ML
500 INJECTION, SOLUTION INTRAVENOUS CONTINUOUS PRN
Status: DISCONTINUED | OUTPATIENT
Start: 2022-06-17 | End: 2022-06-17 | Stop reason: ALTCHOICE

## 2022-06-17 RX ORDER — SODIUM CHLORIDE 0.9 % (FLUSH) 0.9 %
10 SYRINGE (ML) INJECTION ONCE
Status: COMPLETED | OUTPATIENT
Start: 2022-06-17 | End: 2022-06-17

## 2022-06-17 RX ORDER — METOPROLOL TARTRATE 5 MG/5ML
2.5 INJECTION INTRAVENOUS EVERY 5 MIN PRN
Status: DISCONTINUED | OUTPATIENT
Start: 2022-06-17 | End: 2022-06-17 | Stop reason: ALTCHOICE

## 2022-06-17 RX ORDER — ALBUTEROL SULFATE 90 UG/1
2 AEROSOL, METERED RESPIRATORY (INHALATION) PRN
Status: DISCONTINUED | OUTPATIENT
Start: 2022-06-17 | End: 2022-06-17 | Stop reason: ALTCHOICE

## 2022-06-17 RX ORDER — AMINOPHYLLINE DIHYDRATE 25 MG/ML
50 INJECTION, SOLUTION INTRAVENOUS PRN
Status: DISCONTINUED | OUTPATIENT
Start: 2022-06-17 | End: 2022-06-17 | Stop reason: ALTCHOICE

## 2022-06-17 RX ORDER — METOPROLOL TARTRATE 5 MG/5ML
5 INJECTION INTRAVENOUS EVERY 5 MIN PRN
Status: DISCONTINUED | OUTPATIENT
Start: 2022-06-17 | End: 2022-06-17

## 2022-06-17 RX ORDER — LOSARTAN POTASSIUM 50 MG/1
50 TABLET ORAL DAILY
Qty: 30 TABLET | Refills: 0 | Status: SHIPPED | OUTPATIENT
Start: 2022-06-17

## 2022-06-17 RX ADMIN — ATORVASTATIN CALCIUM 20 MG: 10 TABLET, FILM COATED ORAL at 11:45

## 2022-06-17 RX ADMIN — ASPIRIN 81 MG CHEWABLE TABLET 81 MG: 81 TABLET CHEWABLE at 11:45

## 2022-06-17 RX ADMIN — TETRAKIS(2-METHOXYISOBUTYLISOCYANIDE)COPPER(I) TETRAFLUOROBORATE 13.2 MILLICURIE: 1 INJECTION, POWDER, LYOPHILIZED, FOR SOLUTION INTRAVENOUS at 07:55

## 2022-06-17 RX ADMIN — GLIPIZIDE 5 MG: 5 TABLET ORAL at 11:45

## 2022-06-17 RX ADMIN — ENOXAPARIN SODIUM 40 MG: 100 INJECTION SUBCUTANEOUS at 11:46

## 2022-06-17 RX ADMIN — HYDRALAZINE HYDROCHLORIDE 10 MG: 20 INJECTION INTRAMUSCULAR; INTRAVENOUS at 05:07

## 2022-06-17 RX ADMIN — SODIUM CHLORIDE, PRESERVATIVE FREE 10 ML: 5 INJECTION INTRAVENOUS at 11:46

## 2022-06-17 RX ADMIN — ALBUTEROL SULFATE 2 PUFF: 90 AEROSOL, METERED RESPIRATORY (INHALATION) at 11:24

## 2022-06-17 RX ADMIN — METOPROLOL TARTRATE 50 MG: 50 TABLET, FILM COATED ORAL at 11:45

## 2022-06-17 RX ADMIN — ALBUTEROL SULFATE 2 PUFF: 90 AEROSOL, METERED RESPIRATORY (INHALATION) at 05:47

## 2022-06-17 RX ADMIN — BUDESONIDE AND FORMOTEROL FUMARATE DIHYDRATE 2 PUFF: 160; 4.5 AEROSOL RESPIRATORY (INHALATION) at 05:53

## 2022-06-17 RX ADMIN — GABAPENTIN 300 MG: 300 CAPSULE ORAL at 11:45

## 2022-06-17 RX ADMIN — SODIUM CHLORIDE, PRESERVATIVE FREE 10 ML: 5 INJECTION INTRAVENOUS at 08:52

## 2022-06-17 RX ADMIN — METOPROLOL TARTRATE 2.5 MG: 5 INJECTION INTRAVENOUS at 09:49

## 2022-06-17 RX ADMIN — TETRAKIS(2-METHOXYISOBUTYLISOCYANIDE)COPPER(I) TETRAFLUOROBORATE 39.1 MILLICURIE: 1 INJECTION, POWDER, LYOPHILIZED, FOR SOLUTION INTRAVENOUS at 09:55

## 2022-06-17 RX ADMIN — BUDESONIDE AND FORMOTEROL FUMARATE DIHYDRATE 2 PUFF: 160; 4.5 AEROSOL RESPIRATORY (INHALATION) at 11:23

## 2022-06-17 RX ADMIN — ALBUTEROL SULFATE 2 PUFF: 90 AEROSOL, METERED RESPIRATORY (INHALATION) at 17:03

## 2022-06-17 RX ADMIN — REGADENOSON 0.4 MG: 0.08 INJECTION, SOLUTION INTRAVENOUS at 09:54

## 2022-06-17 ASSESSMENT — PAIN SCALES - GENERAL: PAINLEVEL_OUTOF10: 0

## 2022-06-17 NOTE — PROCEDURES
[address]                [id number] [address] [facility] [address]                              CARDIAC STRESS TEST    PATIENT NAME: Micheal Loredo                  :        1965  MED REC NO:   [id number]                             ROOM:       OTFP  ACCOUNT NO:   [de-identified]                           ADMIT DATE: 2022  PROVIDER:     Waldemar Hillman    CARDIOVASCULAR DIAGNOSTIC DEPARTMENT    DATE OF STUDY:  2022    ORDERING PROVIDER:  Alley Snow    PRIMARY CARE PROVIDER:  Maya Gates MD    INTERPRETING PHYSICIAN:  Damon De La Rosa MD, [facility]    LEXISCAN STRESS TESTING    TEST TYPE: LEXISCAN CARDIOLYTE STRESS TEST  INDICATION: CHEST PAIN    RESTING HEART RATE: 75 bpm  RESTING BLOOD PRESSURE: 183/70 mmHg  PEAK HEART RATE: 98 bpm  PEAK BLOOD PRESSURE: 188/70 mmHg    MEDICATION(S) GIVEN: 0.4 mg IV LEXISCAN. PO CAFFEINE. REASON FOR TERMINATION: MEDICATION INFUSION COMPLETE  SYMPTOMS: FLUSHING POST INJECTION. RESOLVED IN RECOVERY. RESTING EKG: ABNORMAL. CONDUCTION ABNORMALITY. NORMAL SINUS RHYTHM,  RIGHT BBB. STRESS HEART RESPONSE: NORMAL RESPONSE.  BLOOD PRESSURE RESPONSE: APPROPRIATE. STRESS EKG's: NORMAL. CHEST DISCOMFORT: PAIN DURING STRESS. NO PAIN DURING RECOVERY. ISCHEMIC EKG CHANGES: NONE.  EKG IMPRESSION: NEGATIVE ELECTROCARDIOGRAPHICALLY LEXISCAN STRESS TEST. NUCLEAR SCAN TO FOLLOW.     Damon De La Rosa    D: 2022 14:04:16       T: 2022 14:05:29     DANNY/BECCA  Job#: [id number]     Doc#: Unknown    CC:    (Retain this field even if not dictated or not decipherable)

## 2022-06-17 NOTE — PROGRESS NOTES
Lexiscan stress test completed and reviewed by DEANGELO Leigh. Patient experienced dyspnea and dull left-sided chest pain but tolerated test well. PO caffeine provided 2 minutes after injection while in recovery and symptoms resolved. IV remained in place for anticipation of returning to inpatient unit. VS returned to baseline, see flow sheets for documented VS throughout procedure.

## 2022-06-17 NOTE — DISCHARGE SUMMARY
[facility]  Office: [address], DO, Jessica Cole DO, Harshad Jerome, DO, Roger Snow, DO, Juan Fine MD, Delfina Villa MD, Tara Quiñones MD, Lali Levy MD, William Kearns MD, Gaby Langston MD, Eligio Camara MD, Naveen Horne, DO, Casandra Cardona MD,  Mayela Olivera DO, Effie Landaverde MD, Shivani Zepeda MD, Riley Lamas DO, Donna George MD, Wilber Kimball MD, Seth Ty, DO, Joli Angelucci, MD, Gretta De La Rosa MD, Reece Lopez, [facility], [facility], [facility], Khari Mohr, CNP, Vero Santana, CNP, Crystal Guzman, CNP, Breanna Lazo, CNP, Odette Marley PA-C, Caron De La Rosa, DNP, Delores Seachon, CNP, Joseph Whittaker, CNP, Taylor Allen, CNP, Edenilson Barrera, CNS, Sherif Nails, DNP, Sascha Clapper, CNP, Calderon Proper, CNP, Shruti Serum, [address]   [facility]    Discharge Summary     Patient ID: Fredy Vsáquez  :  1965   MRN: [id number]     ACCOUNT:  [de-identified]   Patient's PCP: Alka Mcdonald MD  Admit Date: 2022   Discharge Date: 2022     Length of Stay: 1  Code Status:  Full Code  Admitting Physician: Zita Snow DO  Discharge Physician: Zita Snow DO     Active Discharge Diagnoses:     Hospital Problem Lists:  Principal Problem:    Chest pain at rest  Active Problems:    Controlled type 2 diabetes mellitus without complication, without long-term current use of insulin ([address]    Essential hypertension    Tobacco use  Resolved Problems:    * No resolved hospital problems. *      Admission Condition:  fair     Discharged Condition: stable    Hospital Stay:     Hospital Course:  Fredy Vásquez is a 62 y.o. male who was admitted for the management of  Chest pain at rest , presented to ER with Chest Pain    Admitted with atypical cp but had small rise in troponin and numerous risk factors.   Stress test done and considered normal-discharged home    Losartan added for bp control    Significant therapeutic interventions: see above    Significant Diagnostic Studies:   Labs / Micro:  CBC:   Lab Results   Component Value Date    WBC 7.9 06/17/2022    RBC 4.67 06/17/2022    HGB 14.5 06/17/2022    HCT 42.6 06/17/2022    MCV 91.4 06/17/2022    MCH 31.1 06/17/2022    MCHC 34.1 06/17/2022    RDW 12.9 06/17/2022     06/17/2022     CMP:    Lab Results   Component Value Date    GLUCOSE 151 06/17/2022     06/17/2022    K 4.7 06/17/2022     06/17/2022    CO2 23 06/17/2022    BUN 25 06/17/2022    CREATININE 0.86 06/17/2022    ANIONGAP 10 06/17/2022    ALKPHOS 123 06/17/2022    ALT 15 06/17/2022    AST 15 06/17/2022    BILITOT 0.50 06/17/2022    LABALBU 4.1 06/17/2022    ALBUMIN 1.5 06/17/2022    LABGLOM >60 06/17/2022    GFRAA >60 06/17/2022    GFR      06/17/2022    PROT 6.9 06/17/2022    CALCIUM 10.1 06/17/2022        Radiology:  XR CHEST PORTABLE    Result Date: 6/16/2022  No acute cardiopulmonary disease. CT CHEST PULMONARY EMBOLISM W CONTRAST    Result Date: 6/16/2022  No evidence of pulmonary embolism or acute pulmonary abnormality. Few scattered ground-glass nodules with the largest in the left lung apex measuring 7 mm. Follow-up per the [address] [place] [address] criteria is given below. Fleischner Society guidelines for follow-up and management of incidentally detected pulmonary nodules: Multiple Solid Nodules: Nodule size equals 6-8 mm In a low-risk patient, CT at 3-6 months, then consider CT at 18-24 months. In a high-risk patient, CT at 3-6 months, then CT at 18-24 months. - Low risk patients include individuals with minimal or absent history of smoking and other known risk factors. - High risk patients include individuals with a history or smoking or known risk factors. Radiology 2017 http://pubs. rsna.org/doi/full/10.1148/radiol. 2689732413     NM Cardiac Stress Test Nuclear Imaging    Result Date: 6/17/2022  Probably normal study.   Partially reversible inferior wall perfusion defect from apex to base which is worse at rest in the basal region almost completely resolves for small portion in the apex but not present on significant enough slices. No definitive convincing stress-induced ischemia. No infarct. Normal LVEF. Risk stratification: Low       Consultations:    Consults:     Final Specialist Recommendations/Findings:   None      The patient was seen and examined on day of discharge and this discharge summary is in conjunction with any daily progress note from day of discharge. Discharge plan:     Disposition: Home    Physician Follow Up:      MD Flakita Chambers [facility]  [facility]  [phone]    In 1 week         Requiring Further Evaluation/Follow Up POST HOSPITALIZATION/Incidental Findings: will need follow up bmp 1 week since starting losartan    Diet: diabetic diet    Activity: As tolerated    Instructions to Patient: take medications as prescribed      Discharge Medications:      Medication List      START taking these medications    losartan 50 MG tablet  Commonly known as: COZAAR  Take 1 tablet by mouth daily        CONTINUE taking these medications    Aspirin 81 81 MG chewable tablet  Generic drug: aspirin     cyclobenzaprine 10 MG tablet  Commonly known as: FLEXERIL     gabapentin 300 MG capsule  Commonly known as: NEURONTIN     glimepiride 2 MG tablet  Commonly known as: AMARYL     Invokana 100 MG Tabs tablet  Generic drug: canagliflozin     Lipitor 20 MG tablet  Generic drug: atorvastatin     metFORMIN 500 MG extended release tablet  Commonly known as: GLUCOPHAGE-XR     metoprolol tartrate 50 MG tablet  Commonly known as: LOPRESSOR     OZEMPIC (0.25 OR 0.5 MG/DOSE) SC     traMADol 50 MG tablet  Commonly known as: ULTRAM        STOP taking these medications    Basaglar KwikPen 100 UNIT/ML injection pen  Generic drug: insulin glargine     insulin lispro 100 UNIT/ML injection vial  Commonly known as: HUMALOG           Where to Get Your Medications      These medications were sent to [id number] [address]  [address] [place] [address], [address] [place] [address] [address] [address]    Phone: [phone]   · losartan 50 MG tablet         No discharge procedures on file. Time Spent on discharge is  20 mins in patient examination, evaluation, counseling as well as medication reconciliation, prescriptions for required medications, discharge plan and follow up. Electronically signed by   Radha Snow DO  6/17/2022  4:27 PM      Thank you Dr. Arturo Galeana MD for the opportunity to be involved in this patient's care.

## 2022-06-17 NOTE — CARE COORDINATION
Case Management Initial Discharge Plan  Perez Stevens,             Met with:patient to discuss discharge plans. Information verified: address, contacts, phone number, , insurance Yes  Insurance Provider: Berry Goff  Emergency Contact/Next of Kin name & number: Humble Lozano (girlfreind) [phone]  Who are involved in patient's support system? Girlfriend, family  PCP: Tati Sagastume MD  Date of last visit: sees PCP q6 months      Discharge Planning    Living Arrangements:  Spouse/Significant Other       Patient able to perform ADL's:Independent    Current Services (outpatient & in home) none  DME equipment: glucometer      Is patient receiving oral anticoagulation therapy?  no        Does patient have any issues/concerns obtaining medications? No  If yes, what are patient's concerns? PExpected Discharge date:  2022    Patient expects to be discharged to:  HOME         Readmission Risk              Risk of Unplanned Readmission:  0             Does patient have a readmission risk score greater than 14?: No  If yes, follow-up appointment must be made within 7 days of discharge.      Goals of Care: resume ADLS without chest pain      Educated patient on transitional options, provided freedom of choice and are agreeable with plan      Discharge Plan: Home Independently, await Stress Test results          Electronically signed by Feroz Posey RN on 22 at 4:14 PM EDT

## 2022-06-17 NOTE — PROGRESS NOTES
[facility]  Office: [address], DO, Alana Simple, DO, Omer Roach, DO, Huong Elkeith Blood, DO, Gertrudis Frank MD, Kira Arroyo MD, Soto Hanson MD, Kianna Dior MD, Marybeth Evans MD, Vijay Gillette MD, Chitra Batista MD, Candy Shook, DO, Mango Barnett MD,  Ana Mack, DO, Francisco J Rivera MD, Lane Sethi MD, Riki Silverman, DO, Remy Dias MD, Shon Morrow MD, Esteban James DO, Sharath Dent MD, Annalee Loaiza MD, Nereyda Kiser, [facility], [facility], [facility], Oswald Go CNP, Tai Felder CNP, Jessica Santana CNP, Carissa Nguyen, CNP, Elsy Carbone PA-C, Nithya Nguyen, [facility], Mariano Olivarez CNP, Misbah Ann CNP, Aaron Camacho CNP, Hal Guthrie, CNS, Charleen Smith, [facility], Luis Enrique Lauren, CNP, Nichole Allen, CNP, [address]    Progress Note    6/17/2022    11:48 AM    Name:   Nelson De La Torre  MRN:     [id number]     Acct:      [de-identified]   Room:   [address] [place] [address] Day:  0  Admit Date:  6/16/2022  1:09 PM    PCP:   Dominique Hernandez MD  Code Status:  Full Code    Subjective:     C/C:   Chief Complaint   Patient presents with    Chest Pain     Interval History Status: improved. No further cp  No sob/n/v    Brief History:     Nelson De La Torre is a 62 y.o. Non- / non  male who presents with Chest Pain   and is admitted to the hospital for the management of Chest pain at rest.     This 62 yom presents with left sided sharp cp. 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 He had pain that lasted 2-4 minutes.   When he got up, he felt dizzy and had blurred vision and was more sob than usual.  Feels fine now    Review of Systems:     Constitutional:  negative for chills, fevers, sweats  Respiratory:  negative for cough, dyspnea on exertion, shortness of breath, wheezing  Cardiovascular:  negative for chest pain, chest pressure/discomfort, lower extremity edema, palpitations  Gastrointestinal:  negative for abdominal pain, constipation, diarrhea, nausea, vomiting  Neurological:  negative for dizziness, headache    Medications: Allergies: Allergies   Allergen Reactions    Penicillins        Current Meds:   Scheduled Meds:    sodium chloride  80 mL IntraVENous Once    metoprolol tartrate  50 mg Oral BID    atorvastatin  20 mg Oral Daily    glipiZIDE  5 mg Oral QAM AC    gabapentin  300 mg Oral Daily    aspirin  81 mg Oral Daily    sodium chloride flush  5-40 mL IntraVENous 2 times per day    enoxaparin  40 mg SubCUTAneous Daily    budesonide-formoterol  2 puff Inhalation BID     Continuous Infusions:    sodium chloride       PRN Meds: sodium chloride flush, traMADol, cyclobenzaprine, sodium chloride flush, sodium chloride, ondansetron **OR** ondansetron, acetaminophen **OR** acetaminophen, magnesium hydroxide, potassium chloride **OR** potassium alternative oral replacement **OR** potassium chloride, potassium chloride, magnesium sulfate, nitroGLYCERIN, albuterol sulfate HFA    Data:     Past Medical History:   has a past medical history of Asthma, COPD (chronic obstructive pulmonary disease) ([facility] [address] Diabetes mellitus ([facility] Hyperlipidemia, and Hypertension. Social History:   reports that he has been smoking cigarettes. He has been smoking about 2.00 packs per day. He has quit using smokeless tobacco. He reports that he does not drink alcohol and does not use drugs.      Family History:   Family History   Problem Relation Age of Onset    Heart Failure Mother     Cancer Father     Coronary Art Dis Paternal Grandmother     Coronary Art Dis Paternal Grandfather        Vitals:  BP (!) 172/69   Pulse 74   Temp 97.5 °F (36.4 °C) (Oral)   Resp 18   Ht 5' 11\" (1.803 m)   Wt 221 lb 1.9 oz (100.3 kg)   SpO2 94%   BMI 30.84 kg/m²   Temp (24hrs), Av °F (36.7 °C), Min:97.5 °F (36.4 °C), Max:98.2 °F (36.8 °C)    Recent Labs     22  1713   POCGLU 134*       I/O (24Hr): Intake/Output Summary (Last 24 hours) at 2022 1148  Last data filed at 2022 1530  Gross per 24 hour   Intake 500 ml   Output --   Net 500 ml       Labs:  Hematology:  Recent Labs     22  1318 22  0542   WBC 9.4 7.9   RBC 4.66 4.67   HGB 14.6 14.5   HCT 42.2 42.6   MCV 90.6 91.4   MCH 31.4 31.1   MCHC 34.7 34.1   RDW 12.6 12.9    178   MPV 8.9 9.8   DDIMER 0.72  --      Chemistry:  Recent Labs     22  1318 22  1540 22  1811 22  0542     --   --  139   K 4.3  --   --  4.7     --   --  106   CO2 22  --   --  23   GLUCOSE 187*  --   --  151*   BUN 24*  --   --  25*   CREATININE 1.31*  --   --  0.86   MG  --   --   --  1.7   ANIONGAP 12  --   --  10   LABGLOM 56*  --   --  >60   GFRAA >60  --   --  >60   CALCIUM 10.0  --   --  10.1   PROBNP 160  --   --   --    TROPHS 43* 39* 35*  --      Recent Labs     22  1713 06/17/22  0542   PROT  --  6.9   LABALBU  --  4.1   AST  --  15   ALT  --  15   ALKPHOS  --  123   BILITOT  --  0.50   CHOL  --  108   HDL  --  37*   LDLCHOLESTEROL  --  28   CHOLHDLRATIO  --  2.9   TRIG  --  215*   POCGLU 134*  --      ABG:No results found for: POCPH, PHART, PH, POCPCO2, OGB6WKW, PCO2, POCPO2, PO2ART, PO2, POCHCO3, WCT6VJE, HCO3, NBEA, PBEA, BEART, BE, THGBART, THB, GPI9XCR, BDDV9MVF, P2OVAEAW, O2SAT, FIO2  No results found for: SPECIAL  No results found for: CULTURE    Radiology:  XR CHEST PORTABLE    Result Date: 2022  No acute cardiopulmonary disease. CT CHEST PULMONARY EMBOLISM W CONTRAST    Result Date: 2022  No evidence of pulmonary embolism or acute pulmonary abnormality. Few scattered ground-glass nodules with the largest in the left lung apex measuring 7 mm. Follow-up per the [address] [place] [address] criteria is given below.  Fleischner Society guidelines for follow-up and management of incidentally detected pulmonary nodules: Multiple Solid Nodules: Nodule size equals 6-8 mm In a low-risk patient, CT at 3-6 months, then consider CT at 18-24 months. In a high-risk patient, CT at 3-6 months, then CT at 18-24 months. - Low risk patients include individuals with minimal or absent history of smoking and other known risk factors. - High risk patients include individuals with a history or smoking or known risk factors. Radiology 2017 http://pubs. rsna.org/doi/full/10.1148/radiol. 5426772350       Physical Examination:       General appearance:  alert, cooperative and no distress  Mental Status:  oriented to person, place and time and normal affect  Lungs:  clear to auscultation bilaterally, normal effort  Heart:  regular rate and rhythm, no murmur  Abdomen:  soft, nontender, nondistended, normal bowel sounds, no masses, hepatomegaly, splenomegaly  Extremities:  no edema, redness, tenderness in the calves  Skin:  no gross lesions, rashes, induration    Assessment:     Hospital Problems           Last Modified POA    * (Principal) Chest pain at rest 6/16/2022 Yes    Controlled type 2 diabetes mellitus without complication, without long-term current use of insulin (HCC) (Chronic) 6/16/2022 Yes    Essential hypertension (Chronic) 6/16/2022 Yes    Tobacco use (Chronic) 6/16/2022 Yes          Plan:     1.  Dc home if stress is negative    En Block Blood, DO  6/17/2022  11:48 AM

## 2022-06-17 NOTE — CARE COORDINATION
Patient resting in bed on room air with a pulse ox value of 95%. Patient with a history of Asthma and COPD. Patient states he takes Symbicort 160 two puffs twice a day. Patient states he takes Albuterol in the morning. Patient states he does not wear oxygen at home. He does state that he use to have GT but since he lost many pounds of weight it has gotten much better. Patient states he does not need CPAP tonight.     Alhaji Thompson MBA, RRT

## 2022-06-17 NOTE — DISCHARGE INSTR - DIET

## 2022-06-17 NOTE — PROGRESS NOTES
Patient in inpatient unit awaiting lexiscan stress test. Educated on procedure. All questions/concerns addressed. Allergies and medication reviewed. Patient prepped for procedure. Stress test will be supervised by DEANGELO Mccoy.

## 2022-10-21 ENCOUNTER — HOSPITAL ENCOUNTER (EMERGENCY)
Age: 57
Discharge: HOME | End: 2022-10-21
Attending: EMERGENCY MEDICINE
Payer: COMMERCIAL

## 2022-10-21 ENCOUNTER — APPOINTMENT (OUTPATIENT)
Dept: GENERAL RADIOLOGY | Age: 57
End: 2022-10-21
Payer: COMMERCIAL

## 2022-10-21 VITALS
OXYGEN SATURATION: 96 % | RESPIRATION RATE: 16 BRPM | SYSTOLIC BLOOD PRESSURE: 164 MMHG | BODY MASS INDEX: 31.92 KG/M2 | TEMPERATURE: 98.4 F | HEIGHT: 71 IN | DIASTOLIC BLOOD PRESSURE: 75 MMHG | HEART RATE: 88 BPM | WEIGHT: 228 LBS

## 2022-10-21 DIAGNOSIS — T14.8XXA PUNCTURE WOUND: Primary | ICD-10-CM

## 2022-10-21 PROCEDURE — 90471 IMMUNIZATION ADMIN: CPT | Performed by: PHYSICIAN ASSISTANT

## 2022-10-21 PROCEDURE — 6360000002 HC RX W HCPCS: Performed by: PHYSICIAN ASSISTANT

## 2022-10-21 PROCEDURE — 73630 X-RAY EXAM OF FOOT: CPT

## 2022-10-21 PROCEDURE — 6370000000 HC RX 637 (ALT 250 FOR IP): Performed by: PHYSICIAN ASSISTANT

## 2022-10-21 PROCEDURE — 99284 EMERGENCY DEPT VISIT MOD MDM: CPT

## 2022-10-21 PROCEDURE — 90715 TDAP VACCINE 7 YRS/> IM: CPT | Performed by: PHYSICIAN ASSISTANT

## 2022-10-21 RX ORDER — LEVOFLOXACIN 250 MG/1
750 TABLET ORAL ONCE
Status: COMPLETED | OUTPATIENT
Start: 2022-10-21 | End: 2022-10-21

## 2022-10-21 RX ORDER — LEVOFLOXACIN 500 MG/1
500 TABLET, FILM COATED ORAL DAILY
Qty: 10 TABLET | Refills: 0 | Status: SHIPPED | OUTPATIENT
Start: 2022-10-21 | End: 2022-10-31

## 2022-10-21 RX ADMIN — TETANUS TOXOID, REDUCED DIPHTHERIA TOXOID AND ACELLULAR PERTUSSIS VACCINE, ADSORBED 0.5 ML: 5; 2.5; 8; 8; 2.5 SUSPENSION INTRAMUSCULAR at 19:23

## 2022-10-21 RX ADMIN — LEVOFLOXACIN 750 MG: 250 TABLET, FILM COATED ORAL at 19:23

## 2022-10-21 ASSESSMENT — PAIN DESCRIPTION - DESCRIPTORS: DESCRIPTORS: SHARP

## 2022-10-21 ASSESSMENT — PAIN DESCRIPTION - FREQUENCY: FREQUENCY: CONTINUOUS

## 2022-10-21 ASSESSMENT — PAIN SCALES - GENERAL: PAINLEVEL_OUTOF10: 8

## 2022-10-21 ASSESSMENT — PAIN DESCRIPTION - PAIN TYPE: TYPE: ACUTE PAIN

## 2022-10-21 ASSESSMENT — PAIN DESCRIPTION - LOCATION: LOCATION: FOOT

## 2022-10-21 ASSESSMENT — PAIN DESCRIPTION - ORIENTATION: ORIENTATION: RIGHT

## 2022-10-22 NOTE — ED PROVIDER NOTES
[facility]  eMERGENCY dEPARTMENT eNCOUnter      Pt Name: Sue Cao  MRN: [id number]  [place] 1965  Date of evaluation: 10/21/22      CHIEF COMPLAINT       Chief Complaint   Patient presents with    Puncture Wound     Bottom right foot. Stepped on a nail yesterday. Through shoe         HISTORY OF PRESENT ILLNESS    Sue Cao is a 62 y.o. male who presents complaining of right foot pain after he stepped omn a nail yesterday. The history is provided by the patient. Foot Problem  Location:  Foot (Wearing shoes he stepped on a nail. He is here today for evaluation mainly applies pressure walks on the area)  Foot location:  R foot and sole of R foot  Pain details:     Quality:  Aching    Radiates to:  Does not radiate    Severity:  Moderate    Onset quality:  Sudden    Duration:  1 day    Timing:  Intermittent    Progression:  Waxing and waning  Chronicity:  New  Dislocation: no    Tetanus status:  Out of date  Prior injury to area:  Unable to specify  Relieved by:  Rest  Worsened by:  Bearing weight  Associated symptoms: no swelling and no tingling      REVIEW OF SYSTEMS       Review of Systems   Skin:  Positive for wound. All other systems reviewed and are negative.     PAST MEDICAL HISTORY     Past Medical History:   Diagnosis Date    Asthma     COPD (chronic obstructive pulmonary disease) ([facility] [address]     Diabetes mellitus ([facility] [address]     Hyperlipidemia     Hypertension        SURGICAL HISTORY       Past Surgical History:   Procedure Laterality Date    CHOLECYSTECTOMY      CLAVICLE SURGERY Left     ELBOW SURGERY Bilateral     FOOT SURGERY Right     MANDIBLE SURGERY      WRIST SURGERY Right        CURRENT MEDICATIONS       Previous Medications    ASPIRIN (ASPIRIN 81) 81 MG CHEWABLE TABLET    Take 81 mg by mouth daily    ATORVASTATIN (LIPITOR) 20 MG TABLET    Take 1 tablet by mouth daily    CANAGLIFLOZIN (INVOKANA) 100 MG TABS TABLET    Take 1 tablet by mouth daily CYCLOBENZAPRINE (FLEXERIL) 10 MG TABLET    Take 10 mg by mouth 3 times daily as needed    GABAPENTIN (NEURONTIN) 300 MG CAPSULE    Take 1 capsule by mouth daily. [place] GLIMEPIRIDE (AMARYL) 2 MG TABLET    Take 2 mg by mouth daily    LOSARTAN (COZAAR) 50 MG TABLET    Take 1 tablet by mouth daily    METFORMIN (GLUCOPHAGE-XR) 500 MG EXTENDED RELEASE TABLET    Take 2 tablets by mouth 2 times daily    METOPROLOL TARTRATE (LOPRESSOR) 50 MG TABLET    Take 50 mg by mouth 2 times daily    SEMAGLUTIDE (OZEMPIC, 0.25 OR 0.5 MG/DOSE, SC)    Inject into the skin    TRAMADOL (ULTRAM) 50 MG TABLET    Take 50 mg by mouth every 8 hours as needed. .       ALLERGIES     is allergic to penicillins. FAMILY HISTORY     He indicated that his mother is . He indicated that his father is . He indicated that his paternal grandmother is . He indicated that his paternal grandfather is . SOCIAL HISTORY      reports that he has been smoking cigarettes. He has been smoking an average of 2 packs per day. He has quit using smokeless tobacco. He reports that he does not drink alcohol and does not use drugs. PHYSICAL EXAM     INITIAL VITALS: BP (!) 164/75   Pulse 88   Temp 98.4 °F (36.9 °C) (Oral)   Resp 16   Ht 5' 11\" (1.803 m)   Wt 103.4 kg (228 lb)   SpO2 96%   BMI 31.80 kg/m²      Physical Exam  Vitals and nursing note reviewed. Constitutional:       Appearance: He is well-developed. HENT:      Head: Normocephalic and atraumatic. Cardiovascular:      Rate and Rhythm: Normal rate and regular rhythm. Heart sounds: Normal heart sounds. Pulmonary:      Effort: Pulmonary effort is normal.      Breath sounds: Normal breath sounds. Musculoskeletal:         General: Tenderness present. Normal range of motion.       Comments: Small puncture to the plantar aspect of the right foot there is no redness no foreign body no streaking no drainage or range of motion   Skin:     General: Skin is warm and dry.      Capillary Refill: Capillary refill takes less than 2 seconds. Findings: No rash. Neurological:      Mental Status: He is alert and oriented to person, place, and time. MEDICAL DECISION MAKING:   Soaks 2-3 times daily watch for any signs of infection redness increased warmth drainage or streaking. Take antibiotics as directed. Follow-up with your doctor on Monday for recheck. Tylenol Motrin for pain if needed. For any worsening symptoms any other concerns    DIAGNOSTIC RESULTS     EKG: All EKG's are interpreted by the Emergency Department Physician who either signs or Co-signs this chart in the absence of acardiologist.        RADIOLOGY:Allplain film, CT, MRI, and formal ultrasound images (except ED bedside ultrasound) are read by the radiologist and the images and interpretations are directly viewed by the emergency physician. LABS:All lab results were reviewed by myself, and all abnormals are listed below. Labs Reviewed - No data to display      EMERGENCY DEPARTMENT COURSE:   Vitals:    Vitals:    10/21/22 1856   BP: (!) 164/75   Pulse: 88   Resp: 16   Temp: 98.4 °F (36.9 °C)   TempSrc: Oral   SpO2: 96%   Weight: 103.4 kg (228 lb)   Height: 5' 11\" (1.803 m)       The patient was given the following medications while in the emergency department:  Orders Placed This Encounter   Medications    levoFLOXacin (LEVAQUIN) tablet 750 mg     Order Specific Question:   Antimicrobial Indications     Answer:   Skin and Soft Tissue Infection    Tetanus-Diphth-Acell Pertussis (BOOSTRIX) injection 0.5 mL    levoFLOXacin (LEVAQUIN) 500 MG tablet     Sig: Take 1 tablet by mouth daily for 10 days     Dispense:  10 tablet     Refill:  0       -------------------------      CRITICAL CARE:     CONSULTS:  None    PROCEDURES:  Procedures    FINAL IMPRESSION      1.  Puncture wound          DISPOSITION/PLAN   DISPOSITION Decision To Discharge 10/21/2022 08:51:11 PM      PATIENT REFERREDTO:  Parul Woodruff Christopher Romeo [address]  [address]  [facility]  [phone]    Schedule an appointment as soon as possible for a visit in 2 days      [facility] Emergency Department  [address] [facility].   [address] [place] [address]  [phone]    If symptoms worsen    DISCHARGEMEDICATIONS:  New Prescriptions    LEVOFLOXACIN (LEVAQUIN) 500 MG TABLET    Take 1 tablet by mouth daily for 10 days       (Please note that portions of this note were completed with a voice recognition program.  Efforts were made to edit thedictations but occasionally words are mis-transcribed.)    LISA Jaimes PA-C  10/21/22 2059

## 2023-04-18 PROCEDURE — 12001 RPR S/N/AX/GEN/TRNK 2.5CM/<: CPT

## 2023-04-18 PROCEDURE — 99284 EMERGENCY DEPT VISIT MOD MDM: CPT

## 2023-04-19 ENCOUNTER — HOSPITAL ENCOUNTER (EMERGENCY)
Age: 58
Discharge: HOME | End: 2023-04-19
Attending: SPECIALIST
Payer: COMMERCIAL

## 2023-04-19 ENCOUNTER — APPOINTMENT (OUTPATIENT)
Dept: CT IMAGING | Age: 58
End: 2023-04-19
Payer: COMMERCIAL

## 2023-04-19 VITALS
OXYGEN SATURATION: 95 % | SYSTOLIC BLOOD PRESSURE: 192 MMHG | DIASTOLIC BLOOD PRESSURE: 78 MMHG | HEIGHT: 71 IN | RESPIRATION RATE: 16 BRPM | WEIGHT: 229 LBS | HEART RATE: 87 BPM | TEMPERATURE: 97.9 F | BODY MASS INDEX: 32.06 KG/M2

## 2023-04-19 DIAGNOSIS — S01.01XA LACERATION OF SCALP, INITIAL ENCOUNTER: Primary | ICD-10-CM

## 2023-04-19 PROCEDURE — 6360000002 HC RX W HCPCS: Performed by: SPECIALIST

## 2023-04-19 PROCEDURE — 70450 CT HEAD/BRAIN W/O DYE: CPT

## 2023-04-19 RX ORDER — LIDOCAINE HYDROCHLORIDE 10 MG/ML
5 INJECTION, SOLUTION INFILTRATION; PERINEURAL ONCE
Status: DISCONTINUED | OUTPATIENT
Start: 2023-04-19 | End: 2023-04-19 | Stop reason: HOSPADM

## 2023-04-19 RX ORDER — ALBUTEROL SULFATE 2.5 MG/3ML
2.5 SOLUTION RESPIRATORY (INHALATION) ONCE
Status: COMPLETED | OUTPATIENT
Start: 2023-04-19 | End: 2023-04-19

## 2023-04-19 RX ORDER — LIDOCAINE HYDROCHLORIDE 10 MG/ML
INJECTION, SOLUTION EPIDURAL; INFILTRATION; INTRACAUDAL; PERINEURAL
Status: DISCONTINUED
Start: 2023-04-19 | End: 2023-04-19 | Stop reason: HOSPADM

## 2023-04-19 RX ADMIN — ALBUTEROL SULFATE 2.5 MG: 2.5 SOLUTION RESPIRATORY (INHALATION) at 02:40

## 2023-04-19 ASSESSMENT — PAIN DESCRIPTION - DESCRIPTORS: DESCRIPTORS: THROBBING

## 2023-04-19 ASSESSMENT — PAIN SCALES - GENERAL: PAINLEVEL_OUTOF10: 8

## 2023-04-19 ASSESSMENT — PAIN DESCRIPTION - FREQUENCY: FREQUENCY: CONTINUOUS

## 2023-04-19 ASSESSMENT — PAIN DESCRIPTION - ORIENTATION: ORIENTATION: LEFT

## 2023-04-19 ASSESSMENT — PAIN DESCRIPTION - PAIN TYPE: TYPE: ACUTE PAIN

## 2023-04-19 ASSESSMENT — ENCOUNTER SYMPTOMS
VOMITING: 0
NAUSEA: 0

## 2023-04-19 ASSESSMENT — PAIN DESCRIPTION - LOCATION: LOCATION: HEAD

## 2023-04-19 ASSESSMENT — PAIN - FUNCTIONAL ASSESSMENT: PAIN_FUNCTIONAL_ASSESSMENT: 0-10

## 2023-04-19 NOTE — ED PROVIDER NOTES
In the emergency department course, patient started having cough and wheezing for which she was given albuterol aerosol treatment. Laceration was stapled by myself. Please see procedure note. Head injury instructions and wound care instructions were given. Patient was advised to have staples removal in 7 days, take Tylenol and/or ibuprofen as needed for the headache, follow-up with PCP, return if worse. CONSULTS:  None    PROCEDURES:  Laceration Repair Procedure Note    Indication: Laceration    Procedure: The patient was placed in the appropriate position and anesthesia around the laceration was obtained by infiltration using 1% Lidocaine without epinephrine. The area was then cleansed with betadine and draped in a sterile fashion. The laceration was closed with staples. There were no additional lacerations requiring repair. The wound area was then dressed with bacitracin and a bandage. Total repaired wound length: 2.5 cm. Other Items: Staple count: 3    The patient tolerated the procedure well. Complications: None      FINAL IMPRESSION      1. Laceration of scalp, initial encounter          DISPOSITION/PLAN       PATIENT REFERRED TO:  Calin Borrego MD  [address]  [phone]    Call in 1 week  For staple removal    [facility] Emergency Department  [address] [address]  [phone]    If symptoms worsen      DISCHARGE MEDICATIONS:  New Prescriptions    No medications on file       (Please note that portions of this note were completed with a voice recognition program.  Efforts were made to edit the dictations but occasionally words are mis-transcribed.)    Donovan Bermudez MD,, MD, F.A.C.E.P.   Attending Emergency Medicine Physician       Donovan Bermudez MD  04/19/23 1507

## 2023-04-19 NOTE — DISCHARGE INSTRUCTIONS
us how we did during your visit at [email or web address]   and let us know about your experience

## 2024-05-09 ENCOUNTER — HOSPITAL ENCOUNTER (OUTPATIENT)
Dept: PREADMISSION TESTING | Age: 59
Discharge: HOME | End: 2024-05-13

## 2024-05-09 VITALS — HEIGHT: 71 IN | BODY MASS INDEX: 29.12 KG/M2 | WEIGHT: 208 LBS

## 2024-05-09 NOTE — PROGRESS NOTES
Pre-op Instructions For Out-Patient Surgery    Medication Instructions:  Please stop herbs and any supplements now (includes vitamins and minerals).    Please contact your surgeon and prescribing physician for pre-op instructions for any blood thinners. Aspirin     If you have inhalers/aerosol treatments at home, please use them the morning of your surgery and bring the inhalers with you to the hospital.    Please take the following medications the morning of your surgery with a sip of water:    Inhaler, Metoprolol, and eye drops as instructed by Dr. Turpin.     Surgery Instructions:  After midnight before surgery:  Do not eat or drink anything, including water, mints, gum, and hard candy.  You may brush your teeth without swallowing.  No smoking, chewing tobacco, or street drugs.    Please shower or bathe before surgery.        Please do not wear any cologne, lotion, powder, deodorant, jewelry, piercings, perfume, makeup, nail polish, hair accessories, or hair spray on the day of surgery.  Wear loose comfortable clothing.    Leave your valuables at home but bring a payment source for any after-surgery prescriptions you plan to fill at [facility].  Bring a storage case for any glasses/contacts.    An adult who is responsible for you MUST drive you home and should be with you for the first 24 hours after surgery.     If having out-patient knee and foot surgeries, please arrange for planned crutches, walker, or wheelchair before arriving to the hospital.    The Day of Surgery:  Arrive at [facility] Surgery Entrance at the time directed by your surgeon and check in at the desk.     If you have a living will or healthcare power of , please bring a copy.    You will be taken to the pre-op holding area where you will be prepared for surgery.  A physical assessment will be performed by a nurse practitioner or house officer.  Your IV will be started and you will

## 2024-05-17 ENCOUNTER — ANESTHESIA EVENT (OUTPATIENT)
Dept: OPERATING ROOM | Age: 59
End: 2024-05-17
Payer: COMMERCIAL

## 2024-05-20 ENCOUNTER — HOSPITAL ENCOUNTER (OUTPATIENT)
Age: 59
Setting detail: OUTPATIENT SURGERY
Discharge: HOME | End: 2024-05-20
Attending: OPHTHALMOLOGY | Admitting: OPHTHALMOLOGY
Payer: COMMERCIAL

## 2024-05-20 ENCOUNTER — ANESTHESIA (OUTPATIENT)
Dept: OPERATING ROOM | Age: 59
End: 2024-05-20
Payer: COMMERCIAL

## 2024-05-20 VITALS
TEMPERATURE: 97.3 F | RESPIRATION RATE: 16 BRPM | SYSTOLIC BLOOD PRESSURE: 172 MMHG | OXYGEN SATURATION: 100 % | DIASTOLIC BLOOD PRESSURE: 92 MMHG | HEART RATE: 68 BPM

## 2024-05-20 LAB — GLUCOSE BLD-MCNC: 141 MG/DL (ref 75–110)

## 2024-05-20 PROCEDURE — 2500000003 HC RX 250 WO HCPCS: Performed by: OPHTHALMOLOGY

## 2024-05-20 PROCEDURE — 3700000001 HC ADD 15 MINUTES (ANESTHESIA): Performed by: OPHTHALMOLOGY

## 2024-05-20 PROCEDURE — V2632 POST CHMBR INTRAOCULAR LENS: HCPCS | Performed by: OPHTHALMOLOGY

## 2024-05-20 PROCEDURE — 2709999900 HC NON-CHARGEABLE SUPPLY: Performed by: OPHTHALMOLOGY

## 2024-05-20 PROCEDURE — 82947 ASSAY GLUCOSE BLOOD QUANT: CPT

## 2024-05-20 PROCEDURE — 2500000003 HC RX 250 WO HCPCS: Performed by: ANESTHESIOLOGY

## 2024-05-20 PROCEDURE — 3700000000 HC ANESTHESIA ATTENDED CARE: Performed by: OPHTHALMOLOGY

## 2024-05-20 PROCEDURE — 6360000002 HC RX W HCPCS: Performed by: OPHTHALMOLOGY

## 2024-05-20 PROCEDURE — 3600000002 HC SURGERY LEVEL 2 BASE: Performed by: OPHTHALMOLOGY

## 2024-05-20 PROCEDURE — 7100000011 HC PHASE II RECOVERY - ADDTL 15 MIN: Performed by: OPHTHALMOLOGY

## 2024-05-20 PROCEDURE — 3600000012 HC SURGERY LEVEL 2 ADDTL 15MIN: Performed by: OPHTHALMOLOGY

## 2024-05-20 PROCEDURE — 6360000002 HC RX W HCPCS: Performed by: ANESTHESIOLOGY

## 2024-05-20 PROCEDURE — 7100000010 HC PHASE II RECOVERY - FIRST 15 MIN: Performed by: OPHTHALMOLOGY

## 2024-05-20 PROCEDURE — 6370000000 HC RX 637 (ALT 250 FOR IP): Performed by: OPHTHALMOLOGY

## 2024-05-20 DEVICE — LENS CLAREON IOL 23.0D: Type: IMPLANTABLE DEVICE | Site: EYE | Status: FUNCTIONAL

## 2024-05-20 RX ORDER — FLUTICASONE FUROATE, UMECLIDINIUM BROMIDE AND VILANTEROL TRIFENATATE 100; 62.5; 25 UG/1; UG/1; UG/1
1 POWDER RESPIRATORY (INHALATION) DAILY
COMMUNITY

## 2024-05-20 RX ORDER — FENTANYL CITRATE 0.05 MG/ML
25 INJECTION, SOLUTION INTRAMUSCULAR; INTRAVENOUS EVERY 5 MIN PRN
Status: CANCELLED | OUTPATIENT
Start: 2024-05-20

## 2024-05-20 RX ORDER — CYCLOPENTOLATE HYDROCHLORIDE 10 MG/ML
1 SOLUTION/ DROPS OPHTHALMIC PRN
Status: COMPLETED | OUTPATIENT
Start: 2024-05-20 | End: 2024-05-20

## 2024-05-20 RX ORDER — FENTANYL CITRATE 0.05 MG/ML
50 INJECTION, SOLUTION INTRAMUSCULAR; INTRAVENOUS EVERY 5 MIN PRN
Status: CANCELLED | OUTPATIENT
Start: 2024-05-20

## 2024-05-20 RX ORDER — SODIUM CHLORIDE 9 MG/ML
INJECTION, SOLUTION INTRAVENOUS CONTINUOUS
Status: DISCONTINUED | OUTPATIENT
Start: 2024-05-20 | End: 2024-05-20 | Stop reason: HOSPADM

## 2024-05-20 RX ORDER — HYDRALAZINE HYDROCHLORIDE 20 MG/ML
10 INJECTION INTRAMUSCULAR; INTRAVENOUS EVERY 10 MIN PRN
Status: CANCELLED | OUTPATIENT
Start: 2024-05-20

## 2024-05-20 RX ORDER — SODIUM CHLORIDE 0.9 % (FLUSH) 0.9 %
5-40 SYRINGE (ML) INJECTION PRN
Status: DISCONTINUED | OUTPATIENT
Start: 2024-05-20 | End: 2024-05-20 | Stop reason: HOSPADM

## 2024-05-20 RX ORDER — SODIUM CHLORIDE 0.9 % (FLUSH) 0.9 %
5-40 SYRINGE (ML) INJECTION EVERY 12 HOURS SCHEDULED
Status: DISCONTINUED | OUTPATIENT
Start: 2024-05-20 | End: 2024-05-20 | Stop reason: HOSPADM

## 2024-05-20 RX ORDER — SODIUM CHLORIDE 9 MG/ML
INJECTION, SOLUTION INTRAVENOUS PRN
Status: CANCELLED | OUTPATIENT
Start: 2024-05-20

## 2024-05-20 RX ORDER — SODIUM CHLORIDE 9 MG/ML
INJECTION, SOLUTION INTRAVENOUS PRN
Status: DISCONTINUED | OUTPATIENT
Start: 2024-05-20 | End: 2024-05-20 | Stop reason: HOSPADM

## 2024-05-20 RX ORDER — LIDOCAINE HYDROCHLORIDE 10 MG/ML
INJECTION, SOLUTION EPIDURAL; INFILTRATION; INTRACAUDAL; PERINEURAL PRN
Status: DISCONTINUED | OUTPATIENT
Start: 2024-05-20 | End: 2024-05-20 | Stop reason: ALTCHOICE

## 2024-05-20 RX ORDER — LIDOCAINE HYDROCHLORIDE 10 MG/ML
1 INJECTION, SOLUTION EPIDURAL; INFILTRATION; INTRACAUDAL; PERINEURAL
Status: DISCONTINUED | OUTPATIENT
Start: 2024-05-20 | End: 2024-05-20 | Stop reason: HOSPADM

## 2024-05-20 RX ORDER — METOPROLOL TARTRATE 1 MG/ML
INJECTION, SOLUTION INTRAVENOUS PRN
Status: DISCONTINUED | OUTPATIENT
Start: 2024-05-20 | End: 2024-05-20 | Stop reason: SDUPTHER

## 2024-05-20 RX ORDER — ALBUTEROL SULFATE 90 UG/1
2 AEROSOL, METERED RESPIRATORY (INHALATION) EVERY 6 HOURS PRN
COMMUNITY

## 2024-05-20 RX ORDER — PREDNISOLONE ACETATE 10 MG/ML
SUSPENSION/ DROPS OPHTHALMIC PRN
Status: DISCONTINUED | OUTPATIENT
Start: 2024-05-20 | End: 2024-05-20 | Stop reason: ALTCHOICE

## 2024-05-20 RX ORDER — SODIUM CHLORIDE 0.9 % (FLUSH) 0.9 %
5-40 SYRINGE (ML) INJECTION PRN
Status: CANCELLED | OUTPATIENT
Start: 2024-05-20

## 2024-05-20 RX ORDER — CIPROFLOXACIN HYDROCHLORIDE 3.5 MG/ML
SOLUTION/ DROPS TOPICAL PRN
Status: DISCONTINUED | OUTPATIENT
Start: 2024-05-20 | End: 2024-05-20 | Stop reason: ALTCHOICE

## 2024-05-20 RX ORDER — KETOROLAC TROMETHAMINE 5 MG/ML
1 SOLUTION OPHTHALMIC
Status: COMPLETED | OUTPATIENT
Start: 2024-05-20 | End: 2024-05-20

## 2024-05-20 RX ORDER — TETRACAINE HYDROCHLORIDE 5 MG/ML
SOLUTION OPHTHALMIC PRN
Status: DISCONTINUED | OUTPATIENT
Start: 2024-05-20 | End: 2024-05-20 | Stop reason: ALTCHOICE

## 2024-05-20 RX ORDER — DIPHENHYDRAMINE HYDROCHLORIDE 50 MG/ML
12.5 INJECTION INTRAMUSCULAR; INTRAVENOUS
Status: CANCELLED | OUTPATIENT
Start: 2024-05-20 | End: 2024-05-21

## 2024-05-20 RX ORDER — ONDANSETRON 2 MG/ML
4 INJECTION INTRAMUSCULAR; INTRAVENOUS
Status: CANCELLED | OUTPATIENT
Start: 2024-05-20 | End: 2024-05-21

## 2024-05-20 RX ORDER — SODIUM CHLORIDE 0.9 % (FLUSH) 0.9 %
5-40 SYRINGE (ML) INJECTION EVERY 12 HOURS SCHEDULED
Status: CANCELLED | OUTPATIENT
Start: 2024-05-20

## 2024-05-20 RX ORDER — PROPARACAINE HYDROCHLORIDE 5 MG/ML
1 SOLUTION/ DROPS OPHTHALMIC EVERY 5 MIN PRN
Status: COMPLETED | OUTPATIENT
Start: 2024-05-20 | End: 2024-05-20

## 2024-05-20 RX ORDER — MIDAZOLAM HYDROCHLORIDE 1 MG/ML
INJECTION INTRAMUSCULAR; INTRAVENOUS PRN
Status: DISCONTINUED | OUTPATIENT
Start: 2024-05-20 | End: 2024-05-20 | Stop reason: SDUPTHER

## 2024-05-20 RX ADMIN — METOPROLOL TARTRATE 5 MG: 1 INJECTION, SOLUTION INTRAVENOUS at 07:55

## 2024-05-20 RX ADMIN — MIDAZOLAM 1 MG: 1 INJECTION INTRAMUSCULAR; INTRAVENOUS at 07:43

## 2024-05-20 RX ADMIN — Medication 1 DROP: at 06:25

## 2024-05-20 RX ADMIN — CYCLOPENTOLATE HYDROCHLORIDE 1 DROP: 10 SOLUTION OPHTHALMIC at 06:14

## 2024-05-20 RX ADMIN — KETOROLAC TROMETHAMINE 1 DROP: 0.5 SOLUTION OPHTHALMIC at 06:13

## 2024-05-20 RX ADMIN — CYCLOPENTOLATE HYDROCHLORIDE 1 DROP: 10 SOLUTION OPHTHALMIC at 06:25

## 2024-05-20 RX ADMIN — Medication 1 DROP: at 06:20

## 2024-05-20 RX ADMIN — CYCLOPENTOLATE HYDROCHLORIDE 1 DROP: 10 SOLUTION OPHTHALMIC at 06:20

## 2024-05-20 RX ADMIN — Medication 1 DROP: at 06:14

## 2024-05-20 ASSESSMENT — PAIN - FUNCTIONAL ASSESSMENT
PAIN_FUNCTIONAL_ASSESSMENT: 0-10
PAIN_FUNCTIONAL_ASSESSMENT: NONE - DENIES PAIN

## 2024-05-20 ASSESSMENT — ENCOUNTER SYMPTOMS
SINUS PRESSURE: 0
NAUSEA: 0
ABDOMINAL PAIN: 0
SHORTNESS OF BREATH: 0

## 2024-05-20 NOTE — ANESTHESIA POSTPROCEDURE EVALUATION
Department of Anesthesiology  Postprocedure Note    Patient: Kiel Day  MRN: [id number]  YOB: 1965  Date of evaluation: 5/20/2024    Procedure Summary       Date: 05/20/24 Room / Location: [address]    Anesthesia Start: 0735 Anesthesia Stop: 0808    Procedure: EYE CATARACT EMULSIFICATION INTRAOCULAR LENS IMPLANT (Right: Eye) Diagnosis:       Nuclear sclerotic cataract of right eye      (Nuclear sclerotic cataract of right eye [H25.11])    Surgeons: Carlito Turpin MD Responsible Provider: Laura Hooper MD    Anesthesia Type: MAC ASA Status: 2            Anesthesia Type: No value filed.    Lachelle Phase I: Lachelle Score: 10    Lachelle Phase II: Lachelle Score: 10    Anesthesia Post Evaluation    Comments: POST- ANESTHESIA EVALUATION       Pt Name: Kiel Day  MRN: [id number]  YOB: 1965  Date of evaluation: 5/20/2024  Time:  8:36 AM      BP (!) 172/92   Pulse 68   Temp 97.3 °F (36.3 °C) (Infrared)   Resp 16   SpO2 100%      Consciousness Level  Awake  Cardiopulmonary Status  Stable  Pain Adequately Treated YES  Nausea / Vomiting  NO  Adequate Hydration  YES  Anesthesia Related Complications NONE      Electronically signed by Laura Hooper MD on 5/20/2024 at 8:36 AM           No notable events documented.

## 2024-05-20 NOTE — OP NOTE
Operative Note      Patient: Kiel Day  YOB: 1965  MRN: [id number]    Date of Procedure: 5/20/2024    Pre-Op Diagnosis Codes:     * Nuclear sclerotic cataract of right eye [H25.11]    Post-Op Diagnosis: Same       Procedure(s):  EYE CATARACT EMULSIFICATION INTRAOCULAR LENS IMPLANT    Surgeon(s):  Carlito Turpin MD    Assistant:   * No surgical staff found *    Anesthesia: Monitor Anesthesia Care IV sedation    Estimated Blood Loss (mL): Minimal    Complications: None    Specimens:   * No specimens in log *    Implants:  * No implants in log *      Drains: * No LDAs found *    Findings:  Infection Present At Time Of Surgery (PATOS) (choose all levels that have infection present):  No infection present  Other Findings: Cataract    Detailed Description of Procedure:   This is a 59-year-old gentleman having increasing difficulty working seeing measurements on dials engages his best corrected vision in his right eye is 2025 with bright light testing his vision drops to 20/50 he has 2-3+ diffuse PSC in his right eye causing it to be foggy he appears to understand the risk benefits alternatives of cataract surgery in his right eye and wishes to proceed.The patient was brought back into the operating suite, placed in supine position, prepped and draped in usual standard fashion. A lid speculum was placed into the right eye. Approximately 0.3 cc of plain nonpreserved lidocaine was placed onto the eye topically. A paracentesis tract was made at the 2 o'clock position with an eye knife. Then, 0.3 cc of 1% non-preserved lidocaine was placed into the anterior chamber.  Epinephrine 1 1000 plain by sulfate and preservative-free was diluted to 2-10,000 with BSS and approximately 0.5 cc given intracameral.  Trypan blue staining was used.  This was then replaced with Viscoat. Attention was directed superiorly. A conjunctival peritomy was carried out from the 11 o'clock to 1 o'clock position. Hemostasis was

## 2024-05-20 NOTE — H&P
HISTORY and PHYSICAL  [facility]       NAME:  Kiel Day  MRN: [id number]   YOB: 1965   Date: 5/20/2024   Age: 59 y.o.  Gender: male       COMPLAINT AND PRESENT HISTORY:     Kiel Day is 59 y.o.,  male, here for   Pre-Op Diagnosis Codes:     * Nuclear sclerotic cataract of right eye.    Pt will be having  Procedure(s):  EYE CATARACT EMULSIFICATION INTRAOCULAR LENS IMPLANT done today .     Pt  admits  clouded, blurred or dim vision,trouble seeing at night, sensitivity to light and glare,  seeing \"halos\" around lights.     Pt  denies  previous eye surgery.      Pt  denies  eye pain, redness, itching or drainage.    Pt is  wearing corrective lens.      Pt is using eye gtts as prescribed.    Pt denies  family history of glaucoma or blindness.    Medical history reviewed.  Pt denies recent or current chest pain/pressure, palpitations admits to SOB, No recent URI, fever or chills.     NPO p MN.      Pt is on   aspirin blood thinning medications.  Stopped a week ago      RECENT LABS, IMAGING AND TESTING     Lab Results   Component Value Date    WBC 7.9 06/17/2022    RBC 4.67 06/17/2022    HGB 14.5 06/17/2022    HCT 42.6 06/17/2022    MCV 91.4 06/17/2022    MCH 31.1 06/17/2022    MCHC 34.1 06/17/2022    RDW 12.9 06/17/2022     06/17/2022    MPV 9.8 06/17/2022        Lab Results   Component Value Date     06/17/2022    K 4.7 06/17/2022     06/17/2022    CO2 23 06/17/2022    BUN 25 (H) 06/17/2022    CREATININE 0.86 06/17/2022    GLUCOSE 151 (H) 06/17/2022    CALCIUM 10.1 06/17/2022    BILITOT 0.50 06/17/2022    ALKPHOS 123 06/17/2022    AST 15 06/17/2022    ALT 15 06/17/2022         PAST MEDICAL HISTORY     Past Medical History:   Diagnosis Date    Asthma     COPD (chronic obstructive pulmonary disease) (HCC)     Diabetes mellitus (HCC)     Hyperlipidemia     Hypertension        SURGICAL HISTORY       Past Surgical History:   Procedure Laterality Date

## 2024-05-20 NOTE — ANESTHESIA PRE PROCEDURE
Department of Anesthesiology  Preprocedure Note       Name:  Kiel Day   Age:  59 y.o.  :  1965                                          MRN:  [id number]         Date:  2024      Surgeon: Surgeon(s):  Carlito Turpin MD    Procedure: Procedure(s):  EYE CATARACT EMULSIFICATION INTRAOCULAR LENS IMPLANT    Medications prior to admission:   Prior to Admission medications    Medication Sig Start Date End Date Taking? Authorizing Provider   albuterol sulfate HFA (VENTOLIN HFA) 108 (90 Base) MCG/ACT inhaler Inhale 2 puffs into the lungs every 6 hours as needed for Wheezing   Yes Huy Rodríguez MD   fluticasone-umeclidin-vilant (TRELEGY ELLIPTA) 100-62.5-25 MCG/ACT AEPB inhaler Inhale 1 puff into the lungs daily   Yes Huy Rodríguez MD   losartan (COZAAR) 50 MG tablet Take 1 tablet by mouth daily 22   Franky Snow DO   Semaglutide (OZEMPIC, 0.25 OR 0.5 MG/DOSE, SC) Inject into the skin    Huy Rodríguez MD   traMADol (ULTRAM) 50 MG tablet Take 1 tablet by mouth every 8 hours as needed.  Patient not taking: Reported on 2024   Huy Rodríguez MD   metoprolol tartrate (LOPRESSOR) 50 MG tablet Take 1 tablet by mouth 2 times daily    Huy Rodríguez MD   metFORMIN (GLUCOPHAGE-XR) 500 MG extended release tablet Take 2 tablets by mouth 2 times daily    Huy Rodríguez MD   atorvastatin (LIPITOR) 20 MG tablet Take 1 tablet by mouth daily 11/3/15   Huy Rodríguez MD   canagliflozin (INVOKANA) 100 MG TABS tablet Take 1 tablet by mouth daily    Huy Rodríguez MD   glimepiride (AMARYL) 2 MG tablet Take 1 tablet by mouth daily    Huy Rodríguez MD   gabapentin (NEURONTIN) 300 MG capsule Take 1 capsule by mouth daily.  Patient not taking: Reported on 2024    Huy Rodríguez MD   cyclobenzaprine (FLEXERIL) 10 MG tablet Take 1 tablet by mouth 3 times daily as needed 18   Huy Rodríguez MD   aspirin (ASPIRIN 81) 81 MG

## 2025-02-12 ENCOUNTER — APPOINTMENT (OUTPATIENT)
Dept: GENERAL RADIOLOGY | Age: 60
DRG: 193 | End: 2025-02-12
Payer: COMMERCIAL

## 2025-02-12 ENCOUNTER — APPOINTMENT (OUTPATIENT)
Age: 60
DRG: 193 | End: 2025-02-12
Payer: COMMERCIAL

## 2025-02-12 ENCOUNTER — HOSPITAL ENCOUNTER (INPATIENT)
Age: 60
LOS: 4 days | Discharge: HOME | DRG: 193 | End: 2025-02-18
Attending: EMERGENCY MEDICINE | Admitting: HOSPITALIST
Payer: COMMERCIAL

## 2025-02-12 DIAGNOSIS — R07.9 CHEST PAIN OF UNCERTAIN ETIOLOGY: ICD-10-CM

## 2025-02-12 DIAGNOSIS — R55 SYNCOPE, UNSPECIFIED SYNCOPE TYPE: Primary | ICD-10-CM

## 2025-02-12 PROBLEM — I21.4 NSTEMI (NON-ST ELEVATED MYOCARDIAL INFARCTION) (HCC): Status: ACTIVE | Noted: 2025-02-12

## 2025-02-12 PROBLEM — I16.0 HYPERTENSIVE URGENCY: Status: ACTIVE | Noted: 2025-02-12

## 2025-02-12 PROBLEM — E78.2 MIXED HYPERLIPIDEMIA: Status: ACTIVE | Noted: 2025-02-12

## 2025-02-12 LAB
ALBUMIN SERPL-MCNC: 3.4 G/DL (ref 3.5–5.2)
ALBUMIN/GLOB SERPL: 0.9 {RATIO} (ref 1–2.5)
ALP SERPL-CCNC: 106 U/L (ref 40–129)
ALT SERPL-CCNC: 10 U/L (ref 5–41)
ANION GAP SERPL CALCULATED.3IONS-SCNC: 11 MMOL/L (ref 9–17)
AST SERPL-CCNC: 18 U/L
BASOPHILS # BLD: 0.05 K/UL (ref 0–0.2)
BASOPHILS NFR BLD: 1 % (ref 0–2)
BILIRUB SERPL-MCNC: 0.6 MG/DL (ref 0.3–1.2)
BUN SERPL-MCNC: 19 MG/DL (ref 6–20)
CALCIUM SERPL-MCNC: 9 MG/DL (ref 8.6–10.4)
CHLORIDE SERPL-SCNC: 101 MMOL/L (ref 98–107)
CO2 SERPL-SCNC: 26 MMOL/L (ref 20–31)
COHGB MFR BLD: 4.1 % (ref 0–5)
CREAT SERPL-MCNC: 1.4 MG/DL (ref 0.7–1.2)
ECHO AO ROOT DIAM: 3.8 CM
ECHO AO ROOT INDEX: 1.74 CM/M2
ECHO AV AREA PEAK VELOCITY: 2 CM2
ECHO AV AREA VTI: 2 CM2
ECHO AV AREA/BSA PEAK VELOCITY: 0.9 CM2/M2
ECHO AV AREA/BSA VTI: 0.9 CM2/M2
ECHO AV MEAN GRADIENT: 6 MMHG
ECHO AV MEAN VELOCITY: 1.2 M/S
ECHO AV PEAK GRADIENT: 12 MMHG
ECHO AV PEAK VELOCITY: 1.7 M/S
ECHO AV VELOCITY RATIO: 0.53
ECHO AV VTI: 31.2 CM
ECHO BSA: 2.23 M2
ECHO EST RA PRESSURE: 10 MMHG
ECHO IVC PROX: 2.8 CM
ECHO LA AREA 2C: 24.2 CM2
ECHO LA AREA 4C: 22.2 CM2
ECHO LA DIAMETER INDEX: 1.87 CM/M2
ECHO LA DIAMETER: 4.1 CM
ECHO LA MAJOR AXIS: 6.1 CM
ECHO LA MINOR AXIS: 5.3 CM
ECHO LA TO AORTIC ROOT RATIO: 1.08
ECHO LA VOL BP: 81 ML (ref 18–58)
ECHO LA VOL MOD A2C: 90 ML (ref 18–58)
ECHO LA VOL MOD A4C: 64 ML (ref 18–58)
ECHO LA VOL/BSA BIPLANE: 37 ML/M2 (ref 16–34)
ECHO LA VOLUME INDEX MOD A2C: 41 ML/M2 (ref 16–34)
ECHO LA VOLUME INDEX MOD A4C: 29 ML/M2 (ref 16–34)
ECHO LV E' LATERAL VELOCITY: 12.6 CM/S
ECHO LV E' SEPTAL VELOCITY: 8.7 CM/S
ECHO LV EDV A2C: 121 ML
ECHO LV EDV A4C: 149 ML
ECHO LV EDV INDEX A4C: 68 ML/M2
ECHO LV EDV NDEX A2C: 55 ML/M2
ECHO LV EJECTION FRACTION A2C: 50 %
ECHO LV EJECTION FRACTION A4C: 50 %
ECHO LV EJECTION FRACTION BIPLANE: 50 % (ref 55–100)
ECHO LV ESV A2C: 61 ML
ECHO LV ESV A4C: 74 ML
ECHO LV ESV INDEX A2C: 28 ML/M2
ECHO LV ESV INDEX A4C: 34 ML/M2
ECHO LV FRACTIONAL SHORTENING: 31 % (ref 28–44)
ECHO LV INTERNAL DIMENSION DIASTOLE INDEX: 2.37 CM/M2
ECHO LV INTERNAL DIMENSION DIASTOLIC: 5.2 CM (ref 4.2–5.9)
ECHO LV INTERNAL DIMENSION SYSTOLIC INDEX: 1.64 CM/M2
ECHO LV INTERNAL DIMENSION SYSTOLIC: 3.6 CM
ECHO LV IVSD: 1.5 CM (ref 0.6–1)
ECHO LV MASS 2D: 325.8 G (ref 88–224)
ECHO LV MASS INDEX 2D: 148.8 G/M2 (ref 49–115)
ECHO LV POSTERIOR WALL DIASTOLIC: 1.4 CM (ref 0.6–1)
ECHO LV RELATIVE WALL THICKNESS RATIO: 0.54
ECHO LVOT AREA: 3.8 CM2
ECHO LVOT AV VTI INDEX: 0.53
ECHO LVOT DIAM: 2.2 CM
ECHO LVOT MEAN GRADIENT: 2 MMHG
ECHO LVOT PEAK GRADIENT: 3 MMHG
ECHO LVOT PEAK VELOCITY: 0.9 M/S
ECHO LVOT STROKE VOLUME INDEX: 28.6 ML/M2
ECHO LVOT SV: 62.7 ML
ECHO LVOT VTI: 16.5 CM
ECHO MV A VELOCITY: 0.88 M/S
ECHO MV AREA VTI: 3 CM2
ECHO MV E DECELERATION TIME (DT): 172 MS
ECHO MV E VELOCITY: 0.99 M/S
ECHO MV E/A RATIO: 1.13
ECHO MV E/E' LATERAL: 7.86
ECHO MV E/E' RATIO (AVERAGED): 9.62
ECHO MV E/E' SEPTAL: 11.38
ECHO MV LVOT VTI INDEX: 1.28
ECHO MV MAX VELOCITY: 1 M/S
ECHO MV MEAN GRADIENT: 2 MMHG
ECHO MV MEAN VELOCITY: 0.7 M/S
ECHO MV PEAK GRADIENT: 4 MMHG
ECHO MV VTI: 21.2 CM
ECHO PV MAX VELOCITY: 1.2 M/S
ECHO PV PEAK GRADIENT: 6 MMHG
ECHO PVEIN PEAK D VELOCITY: 0.7 M/S
ECHO PVEIN PEAK S VELOCITY: 0.4 M/S
ECHO PVEIN S/D RATIO: 0.6 NO UNITS
ECHO RA AREA 4C: 13.9 CM2
ECHO RA END SYSTOLIC VOLUME APICAL 4 CHAMBER INDEX BSA: 16 ML/M2
ECHO RA VOLUME: 34 ML
ECHO RV BASAL DIMENSION: 3.8 CM
ECHO RV FREE WALL PEAK S': 14.1 CM/S
ECHO RV TAPSE: 2.3 CM (ref 1.7–?)
ECHO TV PEAK GRADIENT: 2 MMHG
EKG ATRIAL RATE: 84 BPM
EKG P AXIS: -17 DEGREES
EKG P-R INTERVAL: 154 MS
EKG Q-T INTERVAL: 396 MS
EKG QRS DURATION: 136 MS
EKG QTC CALCULATION (BAZETT): 467 MS
EKG R AXIS: 109 DEGREES
EKG T AXIS: 37 DEGREES
EKG VENTRICULAR RATE: 84 BPM
EOSINOPHIL # BLD: 0 K/UL (ref 0–0.4)
EOSINOPHILS RELATIVE PERCENT: 0 % (ref 1–4)
ERYTHROCYTE [DISTWIDTH] IN BLOOD BY AUTOMATED COUNT: 12.7 % (ref 12.5–15.4)
FLUAV AG SPEC QL: NEGATIVE
FLUBV AG SPEC QL: NEGATIVE
GFR, ESTIMATED: 58 ML/MIN/1.73M2
GLUCOSE BLD-MCNC: 133 MG/DL (ref 75–110)
GLUCOSE BLD-MCNC: 190 MG/DL (ref 75–110)
GLUCOSE BLD-MCNC: 94 MG/DL (ref 75–110)
GLUCOSE BLD-MCNC: 98 MG/DL (ref 75–110)
GLUCOSE SERPL-MCNC: 128 MG/DL (ref 70–99)
HCT VFR BLD AUTO: 40.1 % (ref 41–53)
HGB BLD-MCNC: 14 G/DL (ref 13.5–17.5)
INR PPP: 1.1 (ref 0.9–1.2)
LYMPHOCYTES NFR BLD: 0.32 K/UL (ref 1–4.8)
LYMPHOCYTES RELATIVE PERCENT: 6 % (ref 24–44)
MAGNESIUM SERPL-MCNC: 1.7 MG/DL (ref 1.6–2.6)
MCH RBC QN AUTO: 31.9 PG (ref 26–34)
MCHC RBC AUTO-ENTMCNC: 35 G/DL (ref 31–37)
MCV RBC AUTO: 91.2 FL (ref 80–100)
MONOCYTES NFR BLD: 0.43 K/UL (ref 0.1–1.2)
MONOCYTES NFR BLD: 8 % (ref 2–11)
MORPHOLOGY: NORMAL
NEUTROPHILS NFR BLD: 85 % (ref 36–66)
NEUTS SEG NFR BLD: 4.6 K/UL (ref 1.8–7.7)
PARTIAL THROMBOPLASTIN TIME: 32.9 SEC (ref 24–36)
PLATELET # BLD AUTO: 171 K/UL (ref 140–450)
PMV BLD AUTO: 8.6 FL (ref 6–12)
POTASSIUM SERPL-SCNC: 4.7 MMOL/L (ref 3.7–5.3)
PROT SERPL-MCNC: 7 G/DL (ref 6.4–8.3)
PROTHROMBIN TIME: 13.7 SEC (ref 11.8–14.6)
RBC # BLD AUTO: 4.4 M/UL (ref 4.5–5.9)
SARS-COV-2 RDRP RESP QL NAA+PROBE: NOT DETECTED
SODIUM SERPL-SCNC: 138 MMOL/L (ref 135–144)
SPECIMEN DESCRIPTION: NORMAL
TROPONIN I SERPL HS-MCNC: 52 NG/L (ref 0–22)
TROPONIN I SERPL HS-MCNC: 53 NG/L (ref 0–22)
TROPONIN I SERPL HS-MCNC: 54 NG/L (ref 0–22)
TROPONIN I SERPL HS-MCNC: 63 NG/L (ref 0–22)
WBC OTHER # BLD: 5.4 K/UL (ref 3.5–11)

## 2025-02-12 PROCEDURE — 87635 SARS-COV-2 COVID-19 AMP PRB: CPT

## 2025-02-12 PROCEDURE — APPSS45 APP SPLIT SHARED TIME 31-45 MINUTES

## 2025-02-12 PROCEDURE — 6370000000 HC RX 637 (ALT 250 FOR IP): Performed by: EMERGENCY MEDICINE

## 2025-02-12 PROCEDURE — 2580000003 HC RX 258: Performed by: INTERNAL MEDICINE

## 2025-02-12 PROCEDURE — 6370000000 HC RX 637 (ALT 250 FOR IP): Performed by: HOSPITALIST

## 2025-02-12 PROCEDURE — 82375 ASSAY CARBOXYHB QUANT: CPT

## 2025-02-12 PROCEDURE — 6370000000 HC RX 637 (ALT 250 FOR IP)

## 2025-02-12 PROCEDURE — G0378 HOSPITAL OBSERVATION PER HR: HCPCS

## 2025-02-12 PROCEDURE — 85025 COMPLETE CBC W/AUTO DIFF WBC: CPT

## 2025-02-12 PROCEDURE — 85730 THROMBOPLASTIN TIME PARTIAL: CPT

## 2025-02-12 PROCEDURE — 99285 EMERGENCY DEPT VISIT HI MDM: CPT

## 2025-02-12 PROCEDURE — 93005 ELECTROCARDIOGRAM TRACING: CPT | Performed by: EMERGENCY MEDICINE

## 2025-02-12 PROCEDURE — 71045 X-RAY EXAM CHEST 1 VIEW: CPT

## 2025-02-12 PROCEDURE — 99222 1ST HOSP IP/OBS MODERATE 55: CPT | Performed by: HOSPITALIST

## 2025-02-12 PROCEDURE — 93306 TTE W/DOPPLER COMPLETE: CPT

## 2025-02-12 PROCEDURE — 6370000000 HC RX 637 (ALT 250 FOR IP): Performed by: INTERNAL MEDICINE

## 2025-02-12 PROCEDURE — 84484 ASSAY OF TROPONIN QUANT: CPT

## 2025-02-12 PROCEDURE — 83735 ASSAY OF MAGNESIUM: CPT

## 2025-02-12 PROCEDURE — 82947 ASSAY GLUCOSE BLOOD QUANT: CPT

## 2025-02-12 PROCEDURE — 93010 ELECTROCARDIOGRAM REPORT: CPT | Performed by: INTERNAL MEDICINE

## 2025-02-12 PROCEDURE — 80053 COMPREHEN METABOLIC PANEL: CPT

## 2025-02-12 PROCEDURE — 93306 TTE W/DOPPLER COMPLETE: CPT | Performed by: INTERNAL MEDICINE

## 2025-02-12 PROCEDURE — 2500000003 HC RX 250 WO HCPCS

## 2025-02-12 PROCEDURE — 87804 INFLUENZA ASSAY W/OPTIC: CPT

## 2025-02-12 PROCEDURE — 85610 PROTHROMBIN TIME: CPT

## 2025-02-12 PROCEDURE — 2700000000 HC OXYGEN THERAPY PER DAY

## 2025-02-12 PROCEDURE — 6360000002 HC RX W HCPCS

## 2025-02-12 PROCEDURE — 94640 AIRWAY INHALATION TREATMENT: CPT

## 2025-02-12 PROCEDURE — 94761 N-INVAS EAR/PLS OXIMETRY MLT: CPT

## 2025-02-12 PROCEDURE — 36415 COLL VENOUS BLD VENIPUNCTURE: CPT

## 2025-02-12 PROCEDURE — 99223 1ST HOSP IP/OBS HIGH 75: CPT | Performed by: INTERNAL MEDICINE

## 2025-02-12 RX ORDER — LISINOPRIL 5 MG/1
5 TABLET ORAL DAILY
Status: DISCONTINUED | OUTPATIENT
Start: 2025-02-12 | End: 2025-02-12

## 2025-02-12 RX ORDER — POTASSIUM CHLORIDE 1500 MG/1
40 TABLET, EXTENDED RELEASE ORAL PRN
Status: DISCONTINUED | OUTPATIENT
Start: 2025-02-12 | End: 2025-02-18 | Stop reason: HOSPADM

## 2025-02-12 RX ORDER — SODIUM CHLORIDE 9 MG/ML
INJECTION, SOLUTION INTRAVENOUS CONTINUOUS
Status: DISCONTINUED | OUTPATIENT
Start: 2025-02-12 | End: 2025-02-14

## 2025-02-12 RX ORDER — CYCLOBENZAPRINE HCL 10 MG
10 TABLET ORAL 3 TIMES DAILY PRN
Status: DISCONTINUED | OUTPATIENT
Start: 2025-02-12 | End: 2025-02-18 | Stop reason: HOSPADM

## 2025-02-12 RX ORDER — POTASSIUM CHLORIDE 7.45 MG/ML
10 INJECTION INTRAVENOUS PRN
Status: DISCONTINUED | OUTPATIENT
Start: 2025-02-12 | End: 2025-02-18 | Stop reason: HOSPADM

## 2025-02-12 RX ORDER — ATORVASTATIN CALCIUM 20 MG/1
20 TABLET, FILM COATED ORAL DAILY
Status: DISCONTINUED | OUTPATIENT
Start: 2025-02-12 | End: 2025-02-18 | Stop reason: HOSPADM

## 2025-02-12 RX ORDER — GLIPIZIDE 5 MG/1
5 TABLET ORAL
Status: DISCONTINUED | OUTPATIENT
Start: 2025-02-12 | End: 2025-02-18 | Stop reason: HOSPADM

## 2025-02-12 RX ORDER — NICOTINE 21 MG/24HR
1 PATCH, TRANSDERMAL 24 HOURS TRANSDERMAL DAILY
Status: DISCONTINUED | OUTPATIENT
Start: 2025-02-12 | End: 2025-02-18 | Stop reason: HOSPADM

## 2025-02-12 RX ORDER — ALBUTEROL SULFATE 0.83 MG/ML
2.5 SOLUTION RESPIRATORY (INHALATION)
Status: DISCONTINUED | OUTPATIENT
Start: 2025-02-12 | End: 2025-02-14

## 2025-02-12 RX ORDER — FENOFIBRATE 160 MG/1
160 TABLET ORAL DAILY
Status: DISCONTINUED | OUTPATIENT
Start: 2025-02-12 | End: 2025-02-18 | Stop reason: HOSPADM

## 2025-02-12 RX ORDER — MORPHINE SULFATE 4 MG/ML
4 INJECTION, SOLUTION INTRAMUSCULAR; INTRAVENOUS
Status: ACTIVE | OUTPATIENT
Start: 2025-02-12 | End: 2025-02-13

## 2025-02-12 RX ORDER — SODIUM CHLORIDE 0.9 % (FLUSH) 0.9 %
5-40 SYRINGE (ML) INJECTION EVERY 12 HOURS SCHEDULED
Status: DISCONTINUED | OUTPATIENT
Start: 2025-02-12 | End: 2025-02-18 | Stop reason: HOSPADM

## 2025-02-12 RX ORDER — ENOXAPARIN SODIUM 100 MG/ML
40 INJECTION SUBCUTANEOUS DAILY
Status: DISCONTINUED | OUTPATIENT
Start: 2025-02-12 | End: 2025-02-15

## 2025-02-12 RX ORDER — SODIUM CHLORIDE 0.9 % (FLUSH) 0.9 %
10 SYRINGE (ML) INJECTION PRN
Status: DISCONTINUED | OUTPATIENT
Start: 2025-02-12 | End: 2025-02-18 | Stop reason: HOSPADM

## 2025-02-12 RX ORDER — METFORMIN HYDROCHLORIDE 500 MG/1
1000 TABLET, EXTENDED RELEASE ORAL 2 TIMES DAILY
Status: DISCONTINUED | OUTPATIENT
Start: 2025-02-12 | End: 2025-02-18 | Stop reason: HOSPADM

## 2025-02-12 RX ORDER — TRAMADOL HYDROCHLORIDE 50 MG/1
50 TABLET ORAL EVERY 8 HOURS PRN
Status: DISCONTINUED | OUTPATIENT
Start: 2025-02-12 | End: 2025-02-18 | Stop reason: HOSPADM

## 2025-02-12 RX ORDER — ONDANSETRON 4 MG/1
4 TABLET, ORALLY DISINTEGRATING ORAL EVERY 8 HOURS PRN
Status: DISCONTINUED | OUTPATIENT
Start: 2025-02-12 | End: 2025-02-18 | Stop reason: HOSPADM

## 2025-02-12 RX ORDER — LISINOPRIL 5 MG/1
5 TABLET ORAL
Status: ON HOLD | COMMUNITY
End: 2025-02-18 | Stop reason: HOSPADM

## 2025-02-12 RX ORDER — NITROGLYCERIN 0.4 MG/1
0.4 TABLET SUBLINGUAL EVERY 5 MIN PRN
Status: DISCONTINUED | OUTPATIENT
Start: 2025-02-12 | End: 2025-02-18 | Stop reason: HOSPADM

## 2025-02-12 RX ORDER — GABAPENTIN 300 MG/1
300 CAPSULE ORAL DAILY
Status: DISCONTINUED | OUTPATIENT
Start: 2025-02-12 | End: 2025-02-18 | Stop reason: HOSPADM

## 2025-02-12 RX ORDER — ACETAMINOPHEN 325 MG/1
650 TABLET ORAL EVERY 6 HOURS PRN
Status: DISCONTINUED | OUTPATIENT
Start: 2025-02-12 | End: 2025-02-18 | Stop reason: HOSPADM

## 2025-02-12 RX ORDER — CARVEDILOL 12.5 MG/1
12.5 TABLET ORAL 2 TIMES DAILY WITH MEALS
Status: DISCONTINUED | OUTPATIENT
Start: 2025-02-12 | End: 2025-02-18

## 2025-02-12 RX ORDER — FENOFIBRATE 160 MG/1
TABLET ORAL
Status: ON HOLD | COMMUNITY

## 2025-02-12 RX ORDER — ASPIRIN 81 MG/1
324 TABLET, CHEWABLE ORAL ONCE
Status: COMPLETED | OUTPATIENT
Start: 2025-02-12 | End: 2025-02-12

## 2025-02-12 RX ORDER — ACETAMINOPHEN 650 MG/1
650 SUPPOSITORY RECTAL EVERY 6 HOURS PRN
Status: DISCONTINUED | OUTPATIENT
Start: 2025-02-12 | End: 2025-02-18 | Stop reason: HOSPADM

## 2025-02-12 RX ORDER — ALBUTEROL SULFATE 90 UG/1
2 INHALANT RESPIRATORY (INHALATION)
Status: DISCONTINUED | OUTPATIENT
Start: 2025-02-12 | End: 2025-02-12

## 2025-02-12 RX ORDER — BUTALBITAL, ACETAMINOPHEN AND CAFFEINE 50; 325; 40 MG/1; MG/1; MG/1
1 TABLET ORAL EVERY 4 HOURS PRN
Status: DISCONTINUED | OUTPATIENT
Start: 2025-02-12 | End: 2025-02-18 | Stop reason: HOSPADM

## 2025-02-12 RX ORDER — HYDRALAZINE HYDROCHLORIDE 20 MG/ML
10 INJECTION INTRAMUSCULAR; INTRAVENOUS EVERY 6 HOURS PRN
Status: DISCONTINUED | OUTPATIENT
Start: 2025-02-12 | End: 2025-02-18 | Stop reason: HOSPADM

## 2025-02-12 RX ORDER — ALBUTEROL SULFATE 90 UG/1
2 INHALANT RESPIRATORY (INHALATION) EVERY 6 HOURS PRN
Status: DISCONTINUED | OUTPATIENT
Start: 2025-02-12 | End: 2025-02-18 | Stop reason: HOSPADM

## 2025-02-12 RX ORDER — MAGNESIUM SULFATE 1 G/100ML
1000 INJECTION INTRAVENOUS PRN
Status: DISCONTINUED | OUTPATIENT
Start: 2025-02-12 | End: 2025-02-18 | Stop reason: HOSPADM

## 2025-02-12 RX ORDER — METOPROLOL TARTRATE 50 MG
50 TABLET ORAL 2 TIMES DAILY
Status: DISCONTINUED | OUTPATIENT
Start: 2025-02-12 | End: 2025-02-12

## 2025-02-12 RX ORDER — SODIUM CHLORIDE 9 MG/ML
INJECTION, SOLUTION INTRAVENOUS PRN
Status: DISCONTINUED | OUTPATIENT
Start: 2025-02-12 | End: 2025-02-18 | Stop reason: HOSPADM

## 2025-02-12 RX ORDER — ASPIRIN 81 MG/1
81 TABLET, CHEWABLE ORAL DAILY
Status: DISCONTINUED | OUTPATIENT
Start: 2025-02-12 | End: 2025-02-18 | Stop reason: HOSPADM

## 2025-02-12 RX ORDER — BUDESONIDE AND FORMOTEROL FUMARATE DIHYDRATE 80; 4.5 UG/1; UG/1
2 AEROSOL RESPIRATORY (INHALATION)
Status: DISCONTINUED | OUTPATIENT
Start: 2025-02-12 | End: 2025-02-18 | Stop reason: HOSPADM

## 2025-02-12 RX ORDER — ONDANSETRON 2 MG/ML
4 INJECTION INTRAMUSCULAR; INTRAVENOUS EVERY 6 HOURS PRN
Status: DISCONTINUED | OUTPATIENT
Start: 2025-02-12 | End: 2025-02-18 | Stop reason: HOSPADM

## 2025-02-12 RX ORDER — LOSARTAN POTASSIUM 50 MG/1
50 TABLET ORAL DAILY
Status: DISCONTINUED | OUTPATIENT
Start: 2025-02-12 | End: 2025-02-18 | Stop reason: HOSPADM

## 2025-02-12 RX ADMIN — BUDESONIDE AND FORMOTEROL FUMARATE DIHYDRATE 2 PUFF: 80; 4.5 AEROSOL RESPIRATORY (INHALATION) at 21:43

## 2025-02-12 RX ADMIN — ASPIRIN 324 MG: 81 TABLET, CHEWABLE ORAL at 04:10

## 2025-02-12 RX ADMIN — BUTALBITAL, ACETAMINOPHEN, AND CAFFEINE 1 TABLET: 325; 50; 40 TABLET ORAL at 18:42

## 2025-02-12 RX ADMIN — ALBUTEROL SULFATE 2 PUFF: 90 AEROSOL, METERED RESPIRATORY (INHALATION) at 21:42

## 2025-02-12 RX ADMIN — ALBUTEROL SULFATE 2.5 MG: 2.5 SOLUTION RESPIRATORY (INHALATION) at 21:54

## 2025-02-12 RX ADMIN — BUDESONIDE AND FORMOTEROL FUMARATE DIHYDRATE 2 PUFF: 80; 4.5 AEROSOL RESPIRATORY (INHALATION) at 10:03

## 2025-02-12 RX ADMIN — ALBUTEROL SULFATE 2 PUFF: 90 AEROSOL, METERED RESPIRATORY (INHALATION) at 10:03

## 2025-02-12 RX ADMIN — METFORMIN HYDROCHLORIDE 1000 MG: 500 TABLET, EXTENDED RELEASE ORAL at 21:51

## 2025-02-12 RX ADMIN — SODIUM CHLORIDE, PRESERVATIVE FREE 10 ML: 5 INJECTION INTRAVENOUS at 06:43

## 2025-02-12 RX ADMIN — GABAPENTIN 300 MG: 300 CAPSULE ORAL at 10:27

## 2025-02-12 RX ADMIN — TIOTROPIUM BROMIDE INHALATION SPRAY 2 PUFF: 3.12 SPRAY, METERED RESPIRATORY (INHALATION) at 10:03

## 2025-02-12 RX ADMIN — LISINOPRIL 5 MG: 5 TABLET ORAL at 10:27

## 2025-02-12 RX ADMIN — METOPROLOL TARTRATE 50 MG: 50 TABLET, FILM COATED ORAL at 10:27

## 2025-02-12 RX ADMIN — FENOFIBRATE 160 MG: 160 TABLET ORAL at 10:27

## 2025-02-12 RX ADMIN — CARVEDILOL 12.5 MG: 12.5 TABLET, FILM COATED ORAL at 21:50

## 2025-02-12 RX ADMIN — METFORMIN HYDROCHLORIDE 1000 MG: 500 TABLET, EXTENDED RELEASE ORAL at 10:27

## 2025-02-12 RX ADMIN — LOSARTAN POTASSIUM 50 MG: 50 TABLET, FILM COATED ORAL at 10:27

## 2025-02-12 RX ADMIN — ENOXAPARIN SODIUM 40 MG: 100 INJECTION SUBCUTANEOUS at 10:28

## 2025-02-12 RX ADMIN — SODIUM CHLORIDE: 9 INJECTION, SOLUTION INTRAVENOUS at 18:38

## 2025-02-12 RX ADMIN — ASPIRIN 81 MG: 81 TABLET, CHEWABLE ORAL at 10:27

## 2025-02-12 RX ADMIN — ATORVASTATIN CALCIUM 20 MG: 10 TABLET, FILM COATED ORAL at 21:51

## 2025-02-12 ASSESSMENT — PAIN DESCRIPTION - LOCATION: LOCATION: HEAD

## 2025-02-12 ASSESSMENT — PAIN - FUNCTIONAL ASSESSMENT: PAIN_FUNCTIONAL_ASSESSMENT: NONE - DENIES PAIN

## 2025-02-12 ASSESSMENT — PAIN SCALES - GENERAL
PAINLEVEL_OUTOF10: 7
PAINLEVEL_OUTOF10: 4

## 2025-02-12 NOTE — H&P
[facility]  Office: [phone]  Lemuel Miramontes DO, Galo Perez DO, Jensen Jacobo DO, Franky Snow DO, Caleb Rodriguez MD, Fatmata Purdy MD, Chyna Arauz MD, Sanam Gonzalez MD,  Speedy Matthew MD, Tl Santos MD, Bonnie Alexander MD,  Neil Trimble DO, Maryam English MD, Anurag Cary MD, Vipul Miramontes DO, Coby Rizzo MD,  Herman Junior DO, Yasmeen Paul MD, Verito Quezada MD, Ora Fairchild MD, Maria C Layton MD,  Jad Riley MD, Jessica Wheat MD, Paresh Stewart MD, Luis Weaver MD, Ramesh Bloom MD, Tommy Epps MD, Jose De Luna DO, Christopher Bocanegra MD, Neil Pedro MD, Mohsin Reza, MD, Shirley Waterhouse, CNP,  Lynette Evans CNP, Jose Berger, CNP,  Renetta Parisi, DNP, Talia Baer, CNP, Stephanie Briggs, CNP, Kelli Angel, CNP, Saundra Franklin CNP, CINTHIA Bustillos-DIANNA, Betina Garcia, CNP, Cece Crawford, CNP,  Inessa Barr, CNP, Estrellita Murcia, CNP, Diane Hernandez, CNP,  Anastasiya Castillo, CNS, Yoli Ivy CNP, Ritu Sarabia CNP,   Bina Monge, CNP         [facility]   IN-PATIENT SERVICE   [facility]    HISTORY AND PHYSICAL EXAMINATION            Date:   2/12/2025  Patient name:  Kiel Day  Date of admission:  2/12/2025  2:01 AM  MRN:   [id number]  Account:  [id number]  YOB: 1965  PCP:    Angelita Walters MD  Room:   ER07/ER  Code Status:    Prior    Chief Complaint:     Chief Complaint   Patient presents with   • Loss of Consciousness       History Obtained From:     patient, electronic medical record    History of Present Illness:     Kiel Day is a 59 y.o. Non- / non  male who presents with Loss of Consciousness   and is admitted to the hospital for the management of Chest pain of uncertain etiology.    This is a 59-year-old male who presents to the emergency department for syncope.  Patient has a past medical history of asthma, COPD, diabetes mellitus type 2, hyperlipidemia and

## 2025-02-12 NOTE — ED NOTES
ED to inpatient nurses report      Chief Complaint:  Chief Complaint   Patient presents with    Loss of Consciousness     Present to ED from: home, lives with wife     MOA:     LOC: alert and orientated to name, place, date  Mobility: Independent  Oxygen Baseline: room air     Current needs required: none    Pending ED orders: none   Present condition: stable     Why did the patient come to the ED? Dizziness, syncopal episode, not feeling well past week   What is the plan? Observation   Any procedures or intervention occur? None   Any safety concerns??none   CODE STATUS Prior  Diet No diet orders on file    Mental Status:  Level of Consciousness: Alert (0)    Psych Assessment:      Vital signs   Vitals:    02/12/25 0205 02/12/25 0211 02/12/25 0315 02/12/25 0413   BP:  (!) 171/68 (!) 171/78 (!) 176/80   Pulse: 85  83 90   Resp: 16      Temp: 98 °F (36.7 °C)      SpO2: 95%  92% 95%   Weight: 98.9 kg (218 lb)      Height: 1.803 m (5' 11\")           Vitals:  Patient Vitals for the past 24 hrs:   BP Temp Pulse Resp SpO2 Height Weight   02/12/25 0413 (!) 176/80 -- 90 -- 95 % -- --   02/12/25 0315 (!) 171/78 -- 83 -- 92 % -- --   02/12/25 0211 (!) 171/68 -- -- -- -- -- --   02/12/25 0205 -- 98 °F (36.7 °C) 85 16 95 % 1.803 m (5' 11\") 98.9 kg (218 lb)      Visit Vitals  BP (!) 176/80   Pulse 90   Temp 98 °F (36.7 °C)   Resp 16   Ht 1.803 m (5' 11\")   Wt 98.9 kg (218 lb)   SpO2 95%   BMI 30.40 kg/m²        LDAs:   Peripheral IV 02/12/25 Left Antecubital (Active)   Site Assessment Clean, dry & intact 02/12/25 0216   Line Status Blood return noted;Brisk blood return 02/12/25 0216   Phlebitis Assessment No symptoms 02/12/25 0216   Infiltration Assessment 0 02/12/25 0216   Dressing Status New dressing applied;Clean, dry & intact 02/12/25 0216   Dressing Type Transparent 02/12/25 0216   Dressing Intervention New 02/12/25 0216       Meds:  Medications   aspirin chewable tablet 324 mg (324 mg Oral Given 2/12/25 4550)

## 2025-02-12 NOTE — ED PROVIDER NOTES
[facility] EMERGENCY DEPARTMENT  EMERGENCY DEPARTMENT ENCOUNTER      Pt Name: Kiel Day  MRN: [id number]  Birthdate 1965  Date of evaluation: 2/12/2025  Provider: Angi Henderson DO    CHIEF COMPLAINT       Chief Complaint   Patient presents with    Loss of Consciousness         HISTORY OF PRESENT ILLNESS   (Location/Symptom, Timing/Onset, Context/Setting, Quality, Duration, Modifying Factors, Severity)  Note limiting factors.   Kiel Day is a 59 y.o. male who presents to the emergency department      Kiel is a 59-year-old male who presents by private vehicle from home after syncopal episode.  The patient states he was walking out the door to go to work around 12:30 AM when he got dizzy and passed out.  He believes he was out for about 5 seconds.  When he woke up he immediately knew where he was.  He does not believe he hit his head.  No chest pain before he passed out.  The patient states he is no longer dizzy but still feels sick.  He reports a headache, sore throat, chest tightness, cough and nausea that began yesterday.  No sick contacts.  He feels like this is similar to when he had pneumonia before.  He did have to leave work earlier yesterday because he did not feel well.  Is a daily smoker.  Family at the bedside also states that this past week he had an injection in his eye and his blood pressure was found to be high in the 200s systolic.  Patient states he was at his PCPs office about a month ago and his blood pressure was high but not that high.  Does take daily medications for his blood pressure and has been taking them.            Nursing Notes were reviewed.    REVIEW OF SYSTEMS    (2-9 systems for level 4, 10 or more for level 5)     Review of Systems   Neurological:  Positive for syncope.       Except as noted above the remainder of the review of systems was reviewed and negative.       PAST MEDICAL HISTORY     Past Medical History:   Diagnosis Date    Asthma     COPD (chronic

## 2025-02-12 NOTE — ED NOTES
Pt presents to ER from home via private auto with significant other for syncopal episode. Squad was on scene with pt. Pt states last he remembers was getting dizzy his chest felt tight and falling on the ground. Pt does not think he hit his head. Pt denies any SOB, states he is not dizzy at this time. Pt states at this time he has a migraine. Pt states he got dizzy like this before when he had pneumonia. Pt states earlier today he left early from work because he was not feeling well, headache, nausea, states he has not felt well for about a week. Pt denies being around anyone sick. Pt admits to being a daily smoker and has a frequent dry cough. Pt is alert and oriented x4 at this time.

## 2025-02-12 NOTE — RT PROTOCOL NOTE
RT Nebulizer Bronchodilator Protocol Note    There is a bronchodilator order in the chart from a provider indicating to follow the RT Bronchodilator Protocol and there is an “Initiate RT Bronchodilator Protocol” order as well (see protocol at bottom of note).    CXR Findings:  XR CHEST PORTABLE    Result Date: 2/12/2025  No acute cardiopulmonary disease.       The findings from the last RT Protocol Assessment were as follows:  Smoking: Chronic pulmonary disease  Respiratory Pattern: Regular pattern and RR 12-20 bpm  Breath Sounds: Slightly diminished and/or crackles  Cough: Strong, productive  Indication for Bronchodilator Therapy:    Bronchodilator Assessment Score: 5    Aerosolized bronchodilator medication orders have been revised according to the RT Nebulizer Bronchodilator Protocol below.    Respiratory Therapist to perform RT Therapy Protocol Assessment initially then follow the protocol.  Repeat RT Therapy Protocol Assessment PRN for score 0-3 or on second treatment, BID, and PRN for scores above 3.    No Indications - adjust the frequency to every 6 hours PRN wheezing or bronchospasm, if no treatments needed after 48 hours then discontinue using Per Protocol order mode.     If indication present, adjust the RT bronchodilator orders based on the Bronchodilator Assessment Score as indicated below.  If a patient is on this medication at home then do not decrease Frequency below that used at home.    0-3 - enter or revise RT bronchodilator order(s) to equivalent RT Bronchodilator order with Frequency of every 4 hours PRN for wheezing or increased work of breathing using Per Protocol order mode.       4-6 - enter or revise RT Bronchodilator order(s) to two equivalent RT bronchodilator orders with one order with BID Frequency and one order with Frequency of every 4 hours PRN wheezing or increased work of breathing using Per Protocol order mode.         7-10 - enter or revise RT Bronchodilator order(s) to two

## 2025-02-12 NOTE — CARE COORDINATION
Case Management Assessment  Initial Evaluation    Date/Time of Evaluation: 2/12/2025 11:45 AM  Assessment Completed by: Neeta Thomas    If patient is discharged prior to next notation, then this note serves as note for discharge by case management.    Patient Name: Kiel Day                   YOB: 1965  Diagnosis: Chest pain of uncertain etiology [R07.9]  Syncope, unspecified syncope type [R55]                   Date / Time: 2/12/2025  2:01 AM    Patient Admission Status: Observation   Readmission Risk (Low < 19, Mod (19-27), High > 27): No data recorded  Current PCP: Angelita Walters MD  PCP verified by CM? Yes    Chart Reviewed: Yes      History Provided by: Patient  Patient Orientation: Alert and Oriented    Patient Cognition: Alert    Hospitalization in the last 30 days (Readmission):  No    If yes, Readmission Assessment in  Navigator will be completed.    Advance Directives:      Code Status: Full Code   Patient's Primary Decision Maker is: Legal Next of Kin      Discharge Planning:    Patient lives with: (P) Spouse/Significant Other Type of Home: (P) Trailer/Mobile Home  Primary Care Giver: Self  Patient Support Systems include: Spouse/Significant Other, Family Members   Current Financial resources: (P) Other (Comment) (Commercial insurance)  Current community resources: (P) Housing  Current services prior to admission: (P) None            Current DME:              Type of Home Care services:  (P) None    ADLS  Prior functional level: Independent in ADLs/IADLs  Current functional level: Independent in ADLs/IADLs    PT AM-PAC:   /24  OT AM-PAC:   /24    Family can provide assistance at DC: Yes  Would you like Case Management to discuss the discharge plan with any other family members/significant others, and if so, who? No  Plans to Return to Present Housing: Yes  Other Identified Issues/Barriers to RETURNING to current housing: n/a  Potential Assistance needed at discharge: (P) N/A

## 2025-02-13 LAB
ANION GAP SERPL CALCULATED.3IONS-SCNC: 8 MMOL/L (ref 9–17)
B PARAP IS1001 DNA NPH QL NAA+NON-PROBE: NOT DETECTED
B PERT DNA SPEC QL NAA+PROBE: NOT DETECTED
BUN SERPL-MCNC: 27 MG/DL (ref 6–20)
C PNEUM DNA NPH QL NAA+NON-PROBE: NOT DETECTED
CALCIUM SERPL-MCNC: 8.2 MG/DL (ref 8.6–10.4)
CHLORIDE SERPL-SCNC: 104 MMOL/L (ref 98–107)
CHOLEST SERPL-MCNC: 156 MG/DL (ref 0–199)
CHOLESTEROL/HDL RATIO: 5
CO2 SERPL-SCNC: 25 MMOL/L (ref 20–31)
CREAT SERPL-MCNC: 1.5 MG/DL (ref 0.7–1.2)
ERYTHROCYTE [DISTWIDTH] IN BLOOD BY AUTOMATED COUNT: 12.6 % (ref 12.5–15.4)
FLUAV H1 2009 PAN RNA NPH NAA+NON-PROBE: DETECTED
FLUAV RNA NPH QL NAA+NON-PROBE: DETECTED
FLUBV RNA NPH QL NAA+NON-PROBE: NOT DETECTED
GFR, ESTIMATED: 53 ML/MIN/1.73M2
GLUCOSE BLD-MCNC: 114 MG/DL (ref 75–110)
GLUCOSE BLD-MCNC: 136 MG/DL (ref 75–110)
GLUCOSE BLD-MCNC: 141 MG/DL (ref 75–110)
GLUCOSE BLD-MCNC: 142 MG/DL (ref 75–110)
GLUCOSE SERPL-MCNC: 120 MG/DL (ref 70–99)
HADV DNA NPH QL NAA+NON-PROBE: NOT DETECTED
HCOV 229E RNA NPH QL NAA+NON-PROBE: NOT DETECTED
HCOV HKU1 RNA NPH QL NAA+NON-PROBE: NOT DETECTED
HCOV NL63 RNA NPH QL NAA+NON-PROBE: NOT DETECTED
HCOV OC43 RNA NPH QL NAA+NON-PROBE: NOT DETECTED
HCT VFR BLD AUTO: 36.2 % (ref 41–53)
HDLC SERPL-MCNC: 31 MG/DL
HGB BLD-MCNC: 12.4 G/DL (ref 13.5–17.5)
HMPV RNA NPH QL NAA+NON-PROBE: NOT DETECTED
HPIV1 RNA NPH QL NAA+NON-PROBE: NOT DETECTED
HPIV2 RNA NPH QL NAA+NON-PROBE: NOT DETECTED
HPIV3 RNA NPH QL NAA+NON-PROBE: NOT DETECTED
HPIV4 RNA NPH QL NAA+NON-PROBE: NOT DETECTED
LDLC SERPL CALC-MCNC: 94 MG/DL (ref 0–100)
M PNEUMO DNA NPH QL NAA+NON-PROBE: NOT DETECTED
MCH RBC QN AUTO: 31.4 PG (ref 26–34)
MCHC RBC AUTO-ENTMCNC: 34.3 G/DL (ref 31–37)
MCV RBC AUTO: 91.5 FL (ref 80–100)
PLATELET # BLD AUTO: 136 K/UL (ref 140–450)
PMV BLD AUTO: 9.4 FL (ref 6–12)
POTASSIUM SERPL-SCNC: 4.3 MMOL/L (ref 3.7–5.3)
PROCALCITONIN SERPL-MCNC: 0.1 NG/ML (ref 0–0.09)
RBC # BLD AUTO: 3.96 M/UL (ref 4.5–5.9)
RSV RNA NPH QL NAA+NON-PROBE: NOT DETECTED
RV+EV RNA NPH QL NAA+NON-PROBE: NOT DETECTED
SARS-COV-2 RNA NPH QL NAA+NON-PROBE: NOT DETECTED
SODIUM SERPL-SCNC: 137 MMOL/L (ref 135–144)
SPECIMEN DESCRIPTION: ABNORMAL
TRIGL SERPL-MCNC: 155 MG/DL
VLDLC SERPL CALC-MCNC: 31 MG/DL (ref 1–30)
WBC OTHER # BLD: 7.5 K/UL (ref 3.5–11)

## 2025-02-13 PROCEDURE — 99232 SBSQ HOSP IP/OBS MODERATE 35: CPT | Performed by: INTERNAL MEDICINE

## 2025-02-13 PROCEDURE — G0378 HOSPITAL OBSERVATION PER HR: HCPCS

## 2025-02-13 PROCEDURE — 85027 COMPLETE CBC AUTOMATED: CPT

## 2025-02-13 PROCEDURE — 6370000000 HC RX 637 (ALT 250 FOR IP): Performed by: HOSPITALIST

## 2025-02-13 PROCEDURE — 2580000003 HC RX 258: Performed by: INTERNAL MEDICINE

## 2025-02-13 PROCEDURE — 6360000002 HC RX W HCPCS

## 2025-02-13 PROCEDURE — 80061 LIPID PANEL: CPT

## 2025-02-13 PROCEDURE — 6370000000 HC RX 637 (ALT 250 FOR IP)

## 2025-02-13 PROCEDURE — 36415 COLL VENOUS BLD VENIPUNCTURE: CPT

## 2025-02-13 PROCEDURE — 6370000000 HC RX 637 (ALT 250 FOR IP): Performed by: INTERNAL MEDICINE

## 2025-02-13 PROCEDURE — 82947 ASSAY GLUCOSE BLOOD QUANT: CPT

## 2025-02-13 PROCEDURE — 0202U NFCT DS 22 TRGT SARS-COV-2: CPT

## 2025-02-13 PROCEDURE — 80048 BASIC METABOLIC PNL TOTAL CA: CPT

## 2025-02-13 PROCEDURE — 94640 AIRWAY INHALATION TREATMENT: CPT

## 2025-02-13 PROCEDURE — 94760 N-INVAS EAR/PLS OXIMETRY 1: CPT

## 2025-02-13 PROCEDURE — 6360000002 HC RX W HCPCS: Performed by: HOSPITALIST

## 2025-02-13 PROCEDURE — 2700000000 HC OXYGEN THERAPY PER DAY

## 2025-02-13 PROCEDURE — 99232 SBSQ HOSP IP/OBS MODERATE 35: CPT | Performed by: HOSPITALIST

## 2025-02-13 PROCEDURE — 2500000003 HC RX 250 WO HCPCS

## 2025-02-13 PROCEDURE — 84145 PROCALCITONIN (PCT): CPT

## 2025-02-13 RX ADMIN — CARVEDILOL 12.5 MG: 12.5 TABLET, FILM COATED ORAL at 18:46

## 2025-02-13 RX ADMIN — SODIUM CHLORIDE, PRESERVATIVE FREE 10 ML: 5 INJECTION INTRAVENOUS at 20:29

## 2025-02-13 RX ADMIN — HYDRALAZINE HYDROCHLORIDE 10 MG: 20 INJECTION INTRAMUSCULAR; INTRAVENOUS at 03:56

## 2025-02-13 RX ADMIN — FENOFIBRATE 160 MG: 160 TABLET ORAL at 09:22

## 2025-02-13 RX ADMIN — BUTALBITAL, ACETAMINOPHEN, AND CAFFEINE 1 TABLET: 325; 50; 40 TABLET ORAL at 15:22

## 2025-02-13 RX ADMIN — ATORVASTATIN CALCIUM 20 MG: 10 TABLET, FILM COATED ORAL at 20:28

## 2025-02-13 RX ADMIN — ALBUTEROL SULFATE 2.5 MG: 2.5 SOLUTION RESPIRATORY (INHALATION) at 13:57

## 2025-02-13 RX ADMIN — METFORMIN HYDROCHLORIDE 1000 MG: 500 TABLET, EXTENDED RELEASE ORAL at 20:28

## 2025-02-13 RX ADMIN — ASPIRIN 81 MG: 81 TABLET, CHEWABLE ORAL at 09:23

## 2025-02-13 RX ADMIN — ALBUTEROL SULFATE 2.5 MG: 2.5 SOLUTION RESPIRATORY (INHALATION) at 21:40

## 2025-02-13 RX ADMIN — BUDESONIDE AND FORMOTEROL FUMARATE DIHYDRATE 2 PUFF: 80; 4.5 AEROSOL RESPIRATORY (INHALATION) at 08:48

## 2025-02-13 RX ADMIN — BUTALBITAL, ACETAMINOPHEN, AND CAFFEINE 1 TABLET: 325; 50; 40 TABLET ORAL at 20:28

## 2025-02-13 RX ADMIN — GABAPENTIN 300 MG: 300 CAPSULE ORAL at 09:23

## 2025-02-13 RX ADMIN — METFORMIN HYDROCHLORIDE 1000 MG: 500 TABLET, EXTENDED RELEASE ORAL at 09:22

## 2025-02-13 RX ADMIN — BUDESONIDE AND FORMOTEROL FUMARATE DIHYDRATE 2 PUFF: 80; 4.5 AEROSOL RESPIRATORY (INHALATION) at 21:40

## 2025-02-13 RX ADMIN — ENOXAPARIN SODIUM 40 MG: 100 INJECTION SUBCUTANEOUS at 09:23

## 2025-02-13 RX ADMIN — ACETAMINOPHEN 650 MG: 325 TABLET ORAL at 20:28

## 2025-02-13 RX ADMIN — ACETAMINOPHEN 650 MG: 325 TABLET ORAL at 03:43

## 2025-02-13 RX ADMIN — CARVEDILOL 12.5 MG: 12.5 TABLET, FILM COATED ORAL at 09:23

## 2025-02-13 RX ADMIN — GLIPIZIDE 5 MG: 5 TABLET ORAL at 09:29

## 2025-02-13 RX ADMIN — TIOTROPIUM BROMIDE INHALATION SPRAY 2 PUFF: 3.12 SPRAY, METERED RESPIRATORY (INHALATION) at 08:48

## 2025-02-13 RX ADMIN — ALBUTEROL SULFATE 2.5 MG: 2.5 SOLUTION RESPIRATORY (INHALATION) at 08:48

## 2025-02-13 RX ADMIN — SODIUM CHLORIDE: 9 INJECTION, SOLUTION INTRAVENOUS at 15:33

## 2025-02-13 ASSESSMENT — PAIN DESCRIPTION - LOCATION: LOCATION: HEAD

## 2025-02-13 ASSESSMENT — PAIN SCALES - GENERAL
PAINLEVEL_OUTOF10: 4
PAINLEVEL_OUTOF10: 6
PAINLEVEL_OUTOF10: 3

## 2025-02-13 NOTE — RT PROTOCOL NOTE
RT Inhaler-Nebulizer Bronchodilator Protocol Note    There is a bronchodilator order in the chart from a provider indicating to follow the RT Bronchodilator Protocol and there is an “Initiate RT Inhaler-Nebulizer Bronchodilator Protocol” order as well (see protocol at bottom of note).    CXR Findings:  XR CHEST PORTABLE    Result Date: 2/12/2025  No acute cardiopulmonary disease.       The findings from the last RT Protocol Assessment were as follows:   History Pulmonary Disease: Chronic pulmonary disease  Respiratory Pattern: Dyspnea on exertion or RR 21-25 bpm  Breath Sounds: Slightly diminished and/or crackles  Cough: Strong, productive  Indication for Bronchodilator Therapy:    Bronchodilator Assessment Score: 7    Aerosolized bronchodilator medication orders have been revised according to the RT Inhaler-Nebulizer Bronchodilator Protocol below.    Respiratory Therapist to perform RT Therapy Protocol Assessment initially then follow the protocol.  Repeat RT Therapy Protocol Assessment PRN for score 0-3 or on second treatment, BID, and PRN for scores above 3.    No Indications - adjust the frequency to every 6 hours PRN wheezing or bronchospasm, if no treatments needed after 48 hours then discontinue using Per Protocol order mode.     If indication present, adjust the RT bronchodilator orders based on the Bronchodilator Assessment Score as indicated below.  Use Inhaler orders unless patient has one or more of the following: on home nebulizer, not able to hold breath for 10 seconds, is not alert and oriented, cannot activate and use MDI correctly, or respiratory rate 25 breaths per minute or more, then use the equivalent nebulizer order(s) with same Frequency and PRN reasons based on the score.  If a patient is on this medication at home then do not decrease Frequency below that used at home.    0-3 - enter or revise RT bronchodilator order(s) to equivalent RT Bronchodilator order with Frequency of every 4 hours

## 2025-02-14 ENCOUNTER — APPOINTMENT (OUTPATIENT)
Dept: GENERAL RADIOLOGY | Age: 60
DRG: 193 | End: 2025-02-14
Payer: COMMERCIAL

## 2025-02-14 PROBLEM — J10.1 INFLUENZA A: Status: ACTIVE | Noted: 2025-02-14

## 2025-02-14 LAB
ANION GAP SERPL CALCULATED.3IONS-SCNC: 9 MMOL/L (ref 9–17)
BUN SERPL-MCNC: 25 MG/DL (ref 6–20)
CALCIUM SERPL-MCNC: 8.6 MG/DL (ref 8.6–10.4)
CHLORIDE SERPL-SCNC: 101 MMOL/L (ref 98–107)
CO2 SERPL-SCNC: 27 MMOL/L (ref 20–31)
CREAT SERPL-MCNC: 1.3 MG/DL (ref 0.7–1.2)
D DIMER PPP FEU-MCNC: 2.73 UG/ML FEU (ref 0–0.59)
ERYTHROCYTE [DISTWIDTH] IN BLOOD BY AUTOMATED COUNT: 12.9 % (ref 12.5–15.4)
GFR, ESTIMATED: 63 ML/MIN/1.73M2
GLUCOSE BLD-MCNC: 102 MG/DL (ref 75–110)
GLUCOSE BLD-MCNC: 88 MG/DL (ref 75–110)
GLUCOSE BLD-MCNC: 98 MG/DL (ref 75–110)
GLUCOSE SERPL-MCNC: 109 MG/DL (ref 70–99)
HCT VFR BLD AUTO: 41.2 % (ref 41–53)
HGB BLD-MCNC: 13.8 G/DL (ref 13.5–17.5)
MCH RBC QN AUTO: 31 PG (ref 26–34)
MCHC RBC AUTO-ENTMCNC: 33.6 G/DL (ref 31–37)
MCV RBC AUTO: 92.2 FL (ref 80–100)
PLATELET # BLD AUTO: 138 K/UL (ref 140–450)
PMV BLD AUTO: 10.1 FL (ref 6–12)
POTASSIUM SERPL-SCNC: 4.5 MMOL/L (ref 3.7–5.3)
RBC # BLD AUTO: 4.47 M/UL (ref 4.5–5.9)
SODIUM SERPL-SCNC: 137 MMOL/L (ref 135–144)
WBC OTHER # BLD: 7.2 K/UL (ref 3.5–11)

## 2025-02-14 PROCEDURE — 6360000002 HC RX W HCPCS: Performed by: HOSPITALIST

## 2025-02-14 PROCEDURE — 6370000000 HC RX 637 (ALT 250 FOR IP)

## 2025-02-14 PROCEDURE — 94761 N-INVAS EAR/PLS OXIMETRY MLT: CPT

## 2025-02-14 PROCEDURE — 85027 COMPLETE CBC AUTOMATED: CPT

## 2025-02-14 PROCEDURE — 94640 AIRWAY INHALATION TREATMENT: CPT

## 2025-02-14 PROCEDURE — 2580000003 HC RX 258: Performed by: INTERNAL MEDICINE

## 2025-02-14 PROCEDURE — 99232 SBSQ HOSP IP/OBS MODERATE 35: CPT | Performed by: HOSPITALIST

## 2025-02-14 PROCEDURE — 2060000000 HC ICU INTERMEDIATE R&B

## 2025-02-14 PROCEDURE — 36415 COLL VENOUS BLD VENIPUNCTURE: CPT

## 2025-02-14 PROCEDURE — 2500000003 HC RX 250 WO HCPCS

## 2025-02-14 PROCEDURE — 6370000000 HC RX 637 (ALT 250 FOR IP): Performed by: INTERNAL MEDICINE

## 2025-02-14 PROCEDURE — 82947 ASSAY GLUCOSE BLOOD QUANT: CPT

## 2025-02-14 PROCEDURE — 6360000002 HC RX W HCPCS

## 2025-02-14 PROCEDURE — 6370000000 HC RX 637 (ALT 250 FOR IP): Performed by: HOSPITALIST

## 2025-02-14 PROCEDURE — 80048 BASIC METABOLIC PNL TOTAL CA: CPT

## 2025-02-14 PROCEDURE — 71045 X-RAY EXAM CHEST 1 VIEW: CPT

## 2025-02-14 PROCEDURE — 99232 SBSQ HOSP IP/OBS MODERATE 35: CPT

## 2025-02-14 PROCEDURE — 2700000000 HC OXYGEN THERAPY PER DAY

## 2025-02-14 PROCEDURE — 85379 FIBRIN DEGRADATION QUANT: CPT

## 2025-02-14 RX ORDER — AMLODIPINE BESYLATE 5 MG/1
5 TABLET ORAL DAILY
Status: DISCONTINUED | OUTPATIENT
Start: 2025-02-14 | End: 2025-02-17

## 2025-02-14 RX ORDER — SODIUM CHLORIDE, SODIUM LACTATE, POTASSIUM CHLORIDE, CALCIUM CHLORIDE 600; 310; 30; 20 MG/100ML; MG/100ML; MG/100ML; MG/100ML
INJECTION, SOLUTION INTRAVENOUS CONTINUOUS
Status: DISCONTINUED | OUTPATIENT
Start: 2025-02-14 | End: 2025-02-16

## 2025-02-14 RX ORDER — ALBUTEROL SULFATE 0.83 MG/ML
2.5 SOLUTION RESPIRATORY (INHALATION)
Status: DISCONTINUED | OUTPATIENT
Start: 2025-02-14 | End: 2025-02-16

## 2025-02-14 RX ORDER — OSELTAMIVIR PHOSPHATE 75 MG/1
75 CAPSULE ORAL 2 TIMES DAILY
Status: DISCONTINUED | OUTPATIENT
Start: 2025-02-14 | End: 2025-02-18 | Stop reason: HOSPADM

## 2025-02-14 RX ADMIN — ALBUTEROL SULFATE 2.5 MG: 2.5 SOLUTION RESPIRATORY (INHALATION) at 12:26

## 2025-02-14 RX ADMIN — TIOTROPIUM BROMIDE INHALATION SPRAY 2 PUFF: 3.12 SPRAY, METERED RESPIRATORY (INHALATION) at 09:24

## 2025-02-14 RX ADMIN — OSELTAMIVIR PHOSPHATE 75 MG: 75 CAPSULE ORAL at 22:39

## 2025-02-14 RX ADMIN — BUDESONIDE AND FORMOTEROL FUMARATE DIHYDRATE 2 PUFF: 80; 4.5 AEROSOL RESPIRATORY (INHALATION) at 21:25

## 2025-02-14 RX ADMIN — TRAMADOL HYDROCHLORIDE 50 MG: 50 TABLET, COATED ORAL at 22:44

## 2025-02-14 RX ADMIN — GLIPIZIDE 5 MG: 5 TABLET ORAL at 07:52

## 2025-02-14 RX ADMIN — CARVEDILOL 12.5 MG: 12.5 TABLET, FILM COATED ORAL at 17:27

## 2025-02-14 RX ADMIN — ASPIRIN 81 MG: 81 TABLET, CHEWABLE ORAL at 08:50

## 2025-02-14 RX ADMIN — BUTALBITAL, ACETAMINOPHEN, AND CAFFEINE 1 TABLET: 325; 50; 40 TABLET ORAL at 07:32

## 2025-02-14 RX ADMIN — BUDESONIDE AND FORMOTEROL FUMARATE DIHYDRATE 2 PUFF: 80; 4.5 AEROSOL RESPIRATORY (INHALATION) at 09:18

## 2025-02-14 RX ADMIN — SODIUM CHLORIDE, PRESERVATIVE FREE 10 ML: 5 INJECTION INTRAVENOUS at 23:05

## 2025-02-14 RX ADMIN — OSELTAMIVIR PHOSPHATE 75 MG: 75 CAPSULE ORAL at 08:50

## 2025-02-14 RX ADMIN — CARVEDILOL 12.5 MG: 12.5 TABLET, FILM COATED ORAL at 07:32

## 2025-02-14 RX ADMIN — SODIUM CHLORIDE, POTASSIUM CHLORIDE, SODIUM LACTATE AND CALCIUM CHLORIDE: 600; 310; 30; 20 INJECTION, SOLUTION INTRAVENOUS at 22:46

## 2025-02-14 RX ADMIN — GABAPENTIN 300 MG: 300 CAPSULE ORAL at 08:50

## 2025-02-14 RX ADMIN — ALBUTEROL SULFATE 2.5 MG: 2.5 SOLUTION RESPIRATORY (INHALATION) at 15:47

## 2025-02-14 RX ADMIN — ALBUTEROL SULFATE 2.5 MG: 2.5 SOLUTION RESPIRATORY (INHALATION) at 09:18

## 2025-02-14 RX ADMIN — METFORMIN HYDROCHLORIDE 1000 MG: 500 TABLET, EXTENDED RELEASE ORAL at 22:39

## 2025-02-14 RX ADMIN — ALBUTEROL SULFATE 2.5 MG: 2.5 SOLUTION RESPIRATORY (INHALATION) at 21:08

## 2025-02-14 RX ADMIN — METFORMIN HYDROCHLORIDE 1000 MG: 500 TABLET, EXTENDED RELEASE ORAL at 07:32

## 2025-02-14 RX ADMIN — AMLODIPINE BESYLATE 5 MG: 5 TABLET ORAL at 11:49

## 2025-02-14 RX ADMIN — ATORVASTATIN CALCIUM 20 MG: 10 TABLET, FILM COATED ORAL at 22:39

## 2025-02-14 RX ADMIN — FENOFIBRATE 160 MG: 160 TABLET ORAL at 08:50

## 2025-02-14 RX ADMIN — ENOXAPARIN SODIUM 40 MG: 100 INJECTION SUBCUTANEOUS at 08:50

## 2025-02-14 RX ADMIN — SODIUM CHLORIDE: 9 INJECTION, SOLUTION INTRAVENOUS at 08:35

## 2025-02-14 ASSESSMENT — PAIN DESCRIPTION - LOCATION: LOCATION: HEAD

## 2025-02-14 ASSESSMENT — PAIN SCALES - GENERAL: PAINLEVEL_OUTOF10: 6

## 2025-02-14 NOTE — RT PROTOCOL NOTE
RT Inhaler-Nebulizer Bronchodilator Protocol Note    There is a bronchodilator order in the chart from a provider indicating to follow the RT Bronchodilator Protocol and there is an “Initiate RT Inhaler-Nebulizer Bronchodilator Protocol” order as well (see protocol at bottom of note).    CXR Findings:  No results found.    The findings from the last RT Protocol Assessment were as follows:   History Pulmonary Disease: Chronic pulmonary disease  Respiratory Pattern: Mild dyspnea at rest, irregular pattern, or RR 21-25 bpm  Breath Sounds: Intermittent or unilateral wheezes  Cough: Weak, non-productive  Indication for Bronchodilator Therapy: On home bronchodilators  Bronchodilator Assessment Score: 13    Aerosolized bronchodilator medication orders have been revised according to the RT Inhaler-Nebulizer Bronchodilator Protocol below.    Respiratory Therapist to perform RT Therapy Protocol Assessment initially then follow the protocol.  Repeat RT Therapy Protocol Assessment PRN for score 0-3 or on second treatment, BID, and PRN for scores above 3.    No Indications - adjust the frequency to every 6 hours PRN wheezing or bronchospasm, if no treatments needed after 48 hours then discontinue using Per Protocol order mode.     If indication present, adjust the RT bronchodilator orders based on the Bronchodilator Assessment Score as indicated below.  Use Inhaler orders unless patient has one or more of the following: on home nebulizer, not able to hold breath for 10 seconds, is not alert and oriented, cannot activate and use MDI correctly, or respiratory rate 25 breaths per minute or more, then use the equivalent nebulizer order(s) with same Frequency and PRN reasons based on the score.  If a patient is on this medication at home then do not decrease Frequency below that used at home.    0-3 - enter or revise RT bronchodilator order(s) to equivalent RT Bronchodilator order with Frequency of every 4 hours PRN for wheezing or

## 2025-02-14 NOTE — CARE COORDINATION
[facility] Quality Flow/Interdisciplinary Rounds Progress Note    Quality Flow Rounds held on February 14, 2025 at 0930    Disciplines Attending:  Bedside Nurse, , , and Nursing Unit Leadership    Barriers to Discharge: clinical status      Anticipated Discharge Date:  TBD    Anticipated Discharge Disposition: home    [facility] RISK OF UNPLANNED READMISSION 2.0             0 Total Score        Discussed patient goal for the day, patient clinical progression, and barriers to discharge. Patient remains on 4L NC. Plan is for a cardiac cath once his respiratory symptoms improve. CM will continue to follow for discharge planning.      Neeta Thomas  February 14, 2025

## 2025-02-15 LAB
ANION GAP SERPL CALCULATED.3IONS-SCNC: 8 MMOL/L (ref 9–17)
ANTI-XA UNFRAC HEPARIN: 0.6 IU/L (ref 0.3–0.7)
ANTI-XA UNFRAC HEPARIN: <0.1 IU/L (ref 0.3–0.7)
BNP SERPL-MCNC: 1191 PG/ML
BUN SERPL-MCNC: 26 MG/DL (ref 6–20)
CALCIUM SERPL-MCNC: 8.4 MG/DL (ref 8.6–10.4)
CHLORIDE SERPL-SCNC: 101 MMOL/L (ref 98–107)
CO2 SERPL-SCNC: 25 MMOL/L (ref 20–31)
CREAT SERPL-MCNC: 1.5 MG/DL (ref 0.7–1.2)
GFR, ESTIMATED: 53 ML/MIN/1.73M2
GLUCOSE BLD-MCNC: 280 MG/DL (ref 75–110)
GLUCOSE BLD-MCNC: 282 MG/DL (ref 75–110)
GLUCOSE BLD-MCNC: 282 MG/DL (ref 75–110)
GLUCOSE SERPL-MCNC: 252 MG/DL (ref 70–99)
INR PPP: 1 (ref 0.9–1.2)
PARTIAL THROMBOPLASTIN TIME: 34.2 SEC (ref 24–36)
POTASSIUM SERPL-SCNC: 4.9 MMOL/L (ref 3.7–5.3)
PROCALCITONIN SERPL-MCNC: 0.11 NG/ML (ref 0–0.09)
PROTHROMBIN TIME: 13.5 SEC (ref 11.8–14.6)
SODIUM SERPL-SCNC: 134 MMOL/L (ref 135–144)

## 2025-02-15 PROCEDURE — 83880 ASSAY OF NATRIURETIC PEPTIDE: CPT

## 2025-02-15 PROCEDURE — 6370000000 HC RX 637 (ALT 250 FOR IP): Performed by: INTERNAL MEDICINE

## 2025-02-15 PROCEDURE — 85520 HEPARIN ASSAY: CPT

## 2025-02-15 PROCEDURE — 36415 COLL VENOUS BLD VENIPUNCTURE: CPT

## 2025-02-15 PROCEDURE — 94667 MNPJ CHEST WALL 1ST: CPT

## 2025-02-15 PROCEDURE — 99232 SBSQ HOSP IP/OBS MODERATE 35: CPT | Performed by: HOSPITALIST

## 2025-02-15 PROCEDURE — 6360000002 HC RX W HCPCS: Performed by: HOSPITALIST

## 2025-02-15 PROCEDURE — 6370000000 HC RX 637 (ALT 250 FOR IP): Performed by: NURSE PRACTITIONER

## 2025-02-15 PROCEDURE — 2500000003 HC RX 250 WO HCPCS

## 2025-02-15 PROCEDURE — 80048 BASIC METABOLIC PNL TOTAL CA: CPT

## 2025-02-15 PROCEDURE — 94761 N-INVAS EAR/PLS OXIMETRY MLT: CPT

## 2025-02-15 PROCEDURE — 6370000000 HC RX 637 (ALT 250 FOR IP)

## 2025-02-15 PROCEDURE — 2060000000 HC ICU INTERMEDIATE R&B

## 2025-02-15 PROCEDURE — 2700000000 HC OXYGEN THERAPY PER DAY

## 2025-02-15 PROCEDURE — 6370000000 HC RX 637 (ALT 250 FOR IP): Performed by: HOSPITALIST

## 2025-02-15 PROCEDURE — 6360000002 HC RX W HCPCS

## 2025-02-15 PROCEDURE — 99232 SBSQ HOSP IP/OBS MODERATE 35: CPT | Performed by: INTERNAL MEDICINE

## 2025-02-15 PROCEDURE — 84145 PROCALCITONIN (PCT): CPT

## 2025-02-15 PROCEDURE — 6360000002 HC RX W HCPCS: Performed by: NURSE PRACTITIONER

## 2025-02-15 PROCEDURE — 85730 THROMBOPLASTIN TIME PARTIAL: CPT

## 2025-02-15 PROCEDURE — 2580000003 HC RX 258: Performed by: INTERNAL MEDICINE

## 2025-02-15 PROCEDURE — 94640 AIRWAY INHALATION TREATMENT: CPT

## 2025-02-15 PROCEDURE — 85610 PROTHROMBIN TIME: CPT

## 2025-02-15 PROCEDURE — 82947 ASSAY GLUCOSE BLOOD QUANT: CPT

## 2025-02-15 RX ORDER — HEPARIN SODIUM 10000 [USP'U]/100ML
5-30 INJECTION, SOLUTION INTRAVENOUS CONTINUOUS
Status: DISCONTINUED | OUTPATIENT
Start: 2025-02-15 | End: 2025-02-17

## 2025-02-15 RX ORDER — GUAIFENESIN 600 MG/1
600 TABLET, EXTENDED RELEASE ORAL 2 TIMES DAILY
Status: DISCONTINUED | OUTPATIENT
Start: 2025-02-15 | End: 2025-02-18 | Stop reason: HOSPADM

## 2025-02-15 RX ORDER — HEPARIN SODIUM 1000 [USP'U]/ML
80 INJECTION, SOLUTION INTRAVENOUS; SUBCUTANEOUS ONCE
Status: COMPLETED | OUTPATIENT
Start: 2025-02-15 | End: 2025-02-15

## 2025-02-15 RX ORDER — GLUCAGON 1 MG/ML
1 KIT INJECTION PRN
Status: DISCONTINUED | OUTPATIENT
Start: 2025-02-15 | End: 2025-02-18 | Stop reason: HOSPADM

## 2025-02-15 RX ORDER — HEPARIN SODIUM 1000 [USP'U]/ML
80 INJECTION, SOLUTION INTRAVENOUS; SUBCUTANEOUS PRN
Status: DISCONTINUED | OUTPATIENT
Start: 2025-02-15 | End: 2025-02-18 | Stop reason: HOSPADM

## 2025-02-15 RX ORDER — CALCIUM CARBONATE 500 MG/1
500 TABLET, CHEWABLE ORAL 3 TIMES DAILY PRN
Status: DISCONTINUED | OUTPATIENT
Start: 2025-02-15 | End: 2025-02-18 | Stop reason: HOSPADM

## 2025-02-15 RX ORDER — HEPARIN SODIUM 10000 [USP'U]/100ML
5-30 INJECTION, SOLUTION INTRAVENOUS CONTINUOUS
Status: DISCONTINUED | OUTPATIENT
Start: 2025-02-15 | End: 2025-02-15

## 2025-02-15 RX ORDER — HEPARIN SODIUM 1000 [USP'U]/ML
40 INJECTION, SOLUTION INTRAVENOUS; SUBCUTANEOUS PRN
Status: DISCONTINUED | OUTPATIENT
Start: 2025-02-15 | End: 2025-02-18 | Stop reason: HOSPADM

## 2025-02-15 RX ORDER — DEXTROSE MONOHYDRATE 100 MG/ML
INJECTION, SOLUTION INTRAVENOUS CONTINUOUS PRN
Status: DISCONTINUED | OUTPATIENT
Start: 2025-02-15 | End: 2025-02-18 | Stop reason: HOSPADM

## 2025-02-15 RX ORDER — INSULIN LISPRO 100 [IU]/ML
0-8 INJECTION, SOLUTION INTRAVENOUS; SUBCUTANEOUS
Status: DISCONTINUED | OUTPATIENT
Start: 2025-02-15 | End: 2025-02-16

## 2025-02-15 RX ADMIN — METFORMIN HYDROCHLORIDE 1000 MG: 500 TABLET, EXTENDED RELEASE ORAL at 21:36

## 2025-02-15 RX ADMIN — CARVEDILOL 12.5 MG: 12.5 TABLET, FILM COATED ORAL at 08:03

## 2025-02-15 RX ADMIN — CARVEDILOL 12.5 MG: 12.5 TABLET, FILM COATED ORAL at 16:22

## 2025-02-15 RX ADMIN — GABAPENTIN 300 MG: 300 CAPSULE ORAL at 08:02

## 2025-02-15 RX ADMIN — GUAIFENESIN 600 MG: 600 TABLET, EXTENDED RELEASE ORAL at 10:51

## 2025-02-15 RX ADMIN — ATORVASTATIN CALCIUM 20 MG: 10 TABLET, FILM COATED ORAL at 21:36

## 2025-02-15 RX ADMIN — INSULIN LISPRO 4 UNITS: 100 INJECTION, SOLUTION INTRAVENOUS; SUBCUTANEOUS at 16:21

## 2025-02-15 RX ADMIN — ENOXAPARIN SODIUM 40 MG: 100 INJECTION SUBCUTANEOUS at 08:02

## 2025-02-15 RX ADMIN — OSELTAMIVIR PHOSPHATE 75 MG: 75 CAPSULE ORAL at 08:02

## 2025-02-15 RX ADMIN — SODIUM CHLORIDE, PRESERVATIVE FREE 10 ML: 5 INJECTION INTRAVENOUS at 08:05

## 2025-02-15 RX ADMIN — METHYLPREDNISOLONE SODIUM SUCCINATE 60 MG: 125 INJECTION, POWDER, FOR SOLUTION INTRAMUSCULAR; INTRAVENOUS at 01:20

## 2025-02-15 RX ADMIN — ALBUTEROL SULFATE 2.5 MG: 2.5 SOLUTION RESPIRATORY (INHALATION) at 11:58

## 2025-02-15 RX ADMIN — ASPIRIN 81 MG: 81 TABLET, CHEWABLE ORAL at 08:03

## 2025-02-15 RX ADMIN — GUAIFENESIN 600 MG: 600 TABLET, EXTENDED RELEASE ORAL at 21:36

## 2025-02-15 RX ADMIN — HEPARIN SODIUM 18 UNITS/KG/HR: 10000 INJECTION, SOLUTION INTRAVENOUS at 14:39

## 2025-02-15 RX ADMIN — BUTALBITAL, ACETAMINOPHEN, AND CAFFEINE 1 TABLET: 325; 50; 40 TABLET ORAL at 12:08

## 2025-02-15 RX ADMIN — METFORMIN HYDROCHLORIDE 1000 MG: 500 TABLET, EXTENDED RELEASE ORAL at 08:02

## 2025-02-15 RX ADMIN — OSELTAMIVIR PHOSPHATE 75 MG: 75 CAPSULE ORAL at 21:36

## 2025-02-15 RX ADMIN — SODIUM CHLORIDE, POTASSIUM CHLORIDE, SODIUM LACTATE AND CALCIUM CHLORIDE: 600; 310; 30; 20 INJECTION, SOLUTION INTRAVENOUS at 16:40

## 2025-02-15 RX ADMIN — TIOTROPIUM BROMIDE INHALATION SPRAY 2 PUFF: 3.12 SPRAY, METERED RESPIRATORY (INHALATION) at 08:57

## 2025-02-15 RX ADMIN — INSULIN LISPRO 4 UNITS: 100 INJECTION, SOLUTION INTRAVENOUS; SUBCUTANEOUS at 21:38

## 2025-02-15 RX ADMIN — ALBUTEROL SULFATE 2.5 MG: 2.5 SOLUTION RESPIRATORY (INHALATION) at 20:39

## 2025-02-15 RX ADMIN — ALBUTEROL SULFATE 2.5 MG: 2.5 SOLUTION RESPIRATORY (INHALATION) at 16:15

## 2025-02-15 RX ADMIN — INSULIN LISPRO 4 UNITS: 100 INJECTION, SOLUTION INTRAVENOUS; SUBCUTANEOUS at 12:30

## 2025-02-15 RX ADMIN — ALBUTEROL SULFATE 2.5 MG: 2.5 SOLUTION RESPIRATORY (INHALATION) at 08:56

## 2025-02-15 RX ADMIN — FENOFIBRATE 160 MG: 160 TABLET ORAL at 08:03

## 2025-02-15 RX ADMIN — METHYLPREDNISOLONE SODIUM SUCCINATE 40 MG: 40 INJECTION, POWDER, FOR SOLUTION INTRAMUSCULAR; INTRAVENOUS at 14:39

## 2025-02-15 RX ADMIN — HEPARIN SODIUM 7900 UNITS: 1000 INJECTION INTRAVENOUS; SUBCUTANEOUS at 14:38

## 2025-02-15 RX ADMIN — GLIPIZIDE 5 MG: 5 TABLET ORAL at 06:19

## 2025-02-15 RX ADMIN — TRAMADOL HYDROCHLORIDE 50 MG: 50 TABLET, COATED ORAL at 06:21

## 2025-02-15 RX ADMIN — BUDESONIDE AND FORMOTEROL FUMARATE DIHYDRATE 2 PUFF: 80; 4.5 AEROSOL RESPIRATORY (INHALATION) at 08:57

## 2025-02-15 RX ADMIN — CALCIUM CARBONATE (ANTACID) CHEW TAB 500 MG 500 MG: 500 CHEW TAB at 16:22

## 2025-02-15 RX ADMIN — BUDESONIDE AND FORMOTEROL FUMARATE DIHYDRATE 2 PUFF: 80; 4.5 AEROSOL RESPIRATORY (INHALATION) at 20:39

## 2025-02-15 RX ADMIN — BUTALBITAL, ACETAMINOPHEN, AND CAFFEINE 1 TABLET: 325; 50; 40 TABLET ORAL at 21:36

## 2025-02-15 RX ADMIN — SODIUM CHLORIDE, POTASSIUM CHLORIDE, SODIUM LACTATE AND CALCIUM CHLORIDE: 600; 310; 30; 20 INJECTION, SOLUTION INTRAVENOUS at 08:12

## 2025-02-15 RX ADMIN — METHYLPREDNISOLONE SODIUM SUCCINATE 60 MG: 125 INJECTION, POWDER, FOR SOLUTION INTRAMUSCULAR; INTRAVENOUS at 08:02

## 2025-02-15 RX ADMIN — AMLODIPINE BESYLATE 5 MG: 5 TABLET ORAL at 08:02

## 2025-02-15 RX ADMIN — HYDRALAZINE HYDROCHLORIDE 10 MG: 20 INJECTION INTRAMUSCULAR; INTRAVENOUS at 17:30

## 2025-02-15 ASSESSMENT — PAIN DESCRIPTION - LOCATION
LOCATION: HEAD

## 2025-02-15 ASSESSMENT — PAIN SCALES - GENERAL
PAINLEVEL_OUTOF10: 7
PAINLEVEL_OUTOF10: 7
PAINLEVEL_OUTOF10: 5
PAINLEVEL_OUTOF10: 8
PAINLEVEL_OUTOF10: 0

## 2025-02-15 ASSESSMENT — PAIN DESCRIPTION - ORIENTATION: ORIENTATION: MID

## 2025-02-15 ASSESSMENT — PAIN DESCRIPTION - DESCRIPTORS: DESCRIPTORS: ACHING;DISCOMFORT

## 2025-02-15 ASSESSMENT — PAIN - FUNCTIONAL ASSESSMENT: PAIN_FUNCTIONAL_ASSESSMENT: ACTIVITIES ARE NOT PREVENTED

## 2025-02-15 NOTE — RT PROTOCOL NOTE
RT Inhaler-Nebulizer Bronchodilator Protocol Note    There is a bronchodilator order in the chart from a provider indicating to follow the RT Bronchodilator Protocol and there is an “Initiate RT Inhaler-Nebulizer Bronchodilator Protocol” order as well (see protocol at bottom of note).    CXR Findings:  XR CHEST PORTABLE    Result Date: 2/14/2025  Peripheral left basilar airspace opacity suspicious for pneumonia.       The findings from the last RT Protocol Assessment were as follows:   History Pulmonary Disease: Chronic pulmonary disease  Respiratory Pattern: Mild dyspnea at rest, irregular pattern, or RR 21-25 bpm  Breath Sounds: Intermittent or unilateral wheezes  Cough: Weak, non-productive  Indication for Bronchodilator Therapy: On home bronchodilators  Bronchodilator Assessment Score: 13    Aerosolized bronchodilator medication orders have been revised according to the RT Inhaler-Nebulizer Bronchodilator Protocol below.    Respiratory Therapist to perform RT Therapy Protocol Assessment initially then follow the protocol.  Repeat RT Therapy Protocol Assessment PRN for score 0-3 or on second treatment, BID, and PRN for scores above 3.    No Indications - adjust the frequency to every 6 hours PRN wheezing or bronchospasm, if no treatments needed after 48 hours then discontinue using Per Protocol order mode.     If indication present, adjust the RT bronchodilator orders based on the Bronchodilator Assessment Score as indicated below.  Use Inhaler orders unless patient has one or more of the following: on home nebulizer, not able to hold breath for 10 seconds, is not alert and oriented, cannot activate and use MDI correctly, or respiratory rate 25 breaths per minute or more, then use the equivalent nebulizer order(s) with same Frequency and PRN reasons based on the score.  If a patient is on this medication at home then do not decrease Frequency below that used at home.    0-3 - enter or revise RT bronchodilator

## 2025-02-15 NOTE — RT PROTOCOL NOTE
RT Inhaler-Nebulizer Bronchodilator Protocol Note    There is a bronchodilator order in the chart from a provider indicating to follow the RT Bronchodilator Protocol and there is an “Initiate RT Inhaler-Nebulizer Bronchodilator Protocol” order as well (see protocol at bottom of note).    CXR Findings:  XR CHEST PORTABLE    Result Date: 2/14/2025  Peripheral left basilar airspace opacity suspicious for pneumonia.       The findings from the last RT Protocol Assessment were as follows:   History Pulmonary Disease: Chronic pulmonary disease  Respiratory Pattern: Mild dyspnea at rest, irregular pattern, or RR 21-25 bpm  Breath Sounds: Inspiratory and expiratory or bilateral wheezing and/or rhonchi  Cough: Strong, productive  Indication for Bronchodilator Therapy: On home bronchodilators  Bronchodilator Assessment Score: 13    Aerosolized bronchodilator medication orders have been revised according to the RT Inhaler-Nebulizer Bronchodilator Protocol below.    Respiratory Therapist to perform RT Therapy Protocol Assessment initially then follow the protocol.  Repeat RT Therapy Protocol Assessment PRN for score 0-3 or on second treatment, BID, and PRN for scores above 3.    No Indications - adjust the frequency to every 6 hours PRN wheezing or bronchospasm, if no treatments needed after 48 hours then discontinue using Per Protocol order mode.     If indication present, adjust the RT bronchodilator orders based on the Bronchodilator Assessment Score as indicated below.  Use Inhaler orders unless patient has one or more of the following: on home nebulizer, not able to hold breath for 10 seconds, is not alert and oriented, cannot activate and use MDI correctly, or respiratory rate 25 breaths per minute or more, then use the equivalent nebulizer order(s) with same Frequency and PRN reasons based on the score.  If a patient is on this medication at home then do not decrease Frequency below that used at home.    0-3 - enter or

## 2025-02-15 NOTE — CONSULTS
Cardiology Consultation         Date:   2/12/2025  Patient name: Kiel Day  Date of admission:  2/12/2025  2:01 AM  MRN:   [id number]  YOB: 1965    Reason for Admission: syncope     Chief Complaint: sudden loss of consciousness     History of present illness:     59-year-old male with no pertinent cardiac history, underlying history of diabetes, hypertension, hyperlipidemia, chronic active smoking and COPD, admitted with syncopal episode which occurred at home.  Patient was apparently working on his furnace, got up, felt nauseous lightheaded dizzy followed by brief loss of consciousness.  He does report viral prodrome for few days.  Also reports emesis and nausea.  Denies any chest pain.  Has progressive dyspnea on exertion over last few months.  No lower extremity edema or orthopnea.  Initial ECG showed sinus rhythm with right bundle branch block.  High-sensitivity troponins are mildly elevated with a flat trend.      Past Medical History:   has a past medical history of Asthma, COPD (chronic obstructive pulmonary disease) (HCC), Diabetes mellitus (HCC), Hyperlipidemia, and Hypertension.    Past Surgical History:   has a past surgical history that includes Cholecystectomy; Foot surgery (Right); Wrist surgery (Right); Elbow surgery (Bilateral); Mandible surgery; Clavicle surgery (Left); and Eye surgery (Right, 5/20/2024).     Home Medications:    Prior to Admission medications    Medication Sig Start Date End Date Taking? Authorizing Provider   lisinopril (PRINIVIL;ZESTRIL) 5 MG tablet 1 tablet   Yes ProviderHuy MD   losartan (COZAAR) 50 MG tablet Take 1 tablet by mouth daily 6/17/22  Yes Franky Snow, DO   Semaglutide (OZEMPIC, 0.25 OR 0.5 MG/DOSE, SC) Inject into the skin   Yes ProviderHuy MD   traMADol (ULTRAM) 50 MG tablet Take 1 tablet by mouth every 8 hours as needed. 4/16/18  Yes Huy Rodríguez MD   metoprolol tartrate (LOPRESSOR) 50 MG tablet 
Insecurity: No Food Insecurity (2/12/2025)    Hunger Vital Sign     Worried About Running Out of Food in the Last Year: Never true     Ran Out of Food in the Last Year: Never true   Transportation Needs: No Transportation Needs (2/12/2025)    PRAPARE - Transportation     Lack of Transportation (Medical): No     Lack of Transportation (Non-Medical): No   Physical Activity: Not on file   Stress: Not on file   Social Connections: Not on file   Intimate Partner Violence: Not on file   Housing Stability: Low Risk  (2/12/2025)    Housing Stability Vital Sign     Unable to Pay for Housing in the Last Year: No     Number of Times Moved in the Last Year: 0     Homeless in the Last Year: No       FAMILY HISTORY:  Family History   Problem Relation Age of Onset    Heart Failure Mother     Cancer Father     Coronary Art Dis Paternal Grandmother     Coronary Art Dis Paternal Grandfather        REVIEW OF SYSTEMS:  All other systems reviewed and are negative.      PHYSICAL EXAM:  Vital Signs Blood pressure (!) 173/64, pulse 85, temperature 99.3 °F (37.4 °C), temperature source Oral, resp. rate 18, height 1.803 m (5' 11\"), weight 98.9 kg (218 lb), SpO2 93%.  Oxygen Amount and Delivery: O2 Flow Rate (L/min): 10 L/min    Admission Weight Weight - Scale: 98.9 kg (218 lb)    General Appearance   Patient is a 59-year-old male.  Able to give a history.  Head  Normocephalic, without obvious abnormality, atraumatic    Eyes  conjunctivae clear. PERRL,     ENT Mallampati class IV  Neck  no adenopathy, no carotid bruit,  Lungs coarse breath sounds I do not hear any crackles rales or wheezes  Heart: regular rate and rhythm, S1, S2 normal, no murmur, click, rub or gallop  Abdomen    Extremities no signs of pretibial edema    Skin  Skin color, texture, turgor normal. No rashes or lesions  Neurologic: Alert and oriented X 3,         Imaging      Lab Review  CBC     Lab Results   Component Value Date/Time    WBC 7.2 02/14/2025 07:33 AM    RBC 4.47

## 2025-02-16 LAB
ANION GAP SERPL CALCULATED.3IONS-SCNC: 9 MMOL/L (ref 9–17)
ANTI-XA UNFRAC HEPARIN: 0.5 IU/L (ref 0.3–0.7)
BUN SERPL-MCNC: 32 MG/DL (ref 6–20)
CALCIUM SERPL-MCNC: 8.6 MG/DL (ref 8.6–10.4)
CHLORIDE SERPL-SCNC: 100 MMOL/L (ref 98–107)
CO2 SERPL-SCNC: 25 MMOL/L (ref 20–31)
CREAT SERPL-MCNC: 1.3 MG/DL (ref 0.7–1.2)
GFR, ESTIMATED: 63 ML/MIN/1.73M2
GLUCOSE BLD-MCNC: 191 MG/DL (ref 75–110)
GLUCOSE BLD-MCNC: 201 MG/DL (ref 75–110)
GLUCOSE BLD-MCNC: 219 MG/DL (ref 75–110)
GLUCOSE BLD-MCNC: 240 MG/DL (ref 75–110)
GLUCOSE SERPL-MCNC: 264 MG/DL (ref 70–99)
POTASSIUM SERPL-SCNC: 5.2 MMOL/L (ref 3.7–5.3)
SODIUM SERPL-SCNC: 134 MMOL/L (ref 135–144)

## 2025-02-16 PROCEDURE — 6370000000 HC RX 637 (ALT 250 FOR IP)

## 2025-02-16 PROCEDURE — 6360000002 HC RX W HCPCS: Performed by: HOSPITALIST

## 2025-02-16 PROCEDURE — 94640 AIRWAY INHALATION TREATMENT: CPT

## 2025-02-16 PROCEDURE — 6360000002 HC RX W HCPCS: Performed by: NURSE PRACTITIONER

## 2025-02-16 PROCEDURE — 6370000000 HC RX 637 (ALT 250 FOR IP): Performed by: NURSE PRACTITIONER

## 2025-02-16 PROCEDURE — 6370000000 HC RX 637 (ALT 250 FOR IP): Performed by: HOSPITALIST

## 2025-02-16 PROCEDURE — 2700000000 HC OXYGEN THERAPY PER DAY

## 2025-02-16 PROCEDURE — 80048 BASIC METABOLIC PNL TOTAL CA: CPT

## 2025-02-16 PROCEDURE — 6370000000 HC RX 637 (ALT 250 FOR IP): Performed by: INTERNAL MEDICINE

## 2025-02-16 PROCEDURE — 36415 COLL VENOUS BLD VENIPUNCTURE: CPT

## 2025-02-16 PROCEDURE — 99232 SBSQ HOSP IP/OBS MODERATE 35: CPT | Performed by: HOSPITALIST

## 2025-02-16 PROCEDURE — 94761 N-INVAS EAR/PLS OXIMETRY MLT: CPT

## 2025-02-16 PROCEDURE — 2060000000 HC ICU INTERMEDIATE R&B

## 2025-02-16 PROCEDURE — 85520 HEPARIN ASSAY: CPT

## 2025-02-16 PROCEDURE — 82947 ASSAY GLUCOSE BLOOD QUANT: CPT

## 2025-02-16 RX ORDER — ALBUTEROL SULFATE 0.83 MG/ML
2.5 SOLUTION RESPIRATORY (INHALATION)
Status: DISCONTINUED | OUTPATIENT
Start: 2025-02-16 | End: 2025-02-17

## 2025-02-16 RX ORDER — INSULIN LISPRO 100 [IU]/ML
0-16 INJECTION, SOLUTION INTRAVENOUS; SUBCUTANEOUS
Status: DISCONTINUED | OUTPATIENT
Start: 2025-02-16 | End: 2025-02-18 | Stop reason: HOSPADM

## 2025-02-16 RX ORDER — INSULIN LISPRO 100 [IU]/ML
4 INJECTION, SOLUTION INTRAVENOUS; SUBCUTANEOUS ONCE
Status: COMPLETED | OUTPATIENT
Start: 2025-02-16 | End: 2025-02-16

## 2025-02-16 RX ADMIN — METFORMIN HYDROCHLORIDE 1000 MG: 500 TABLET, EXTENDED RELEASE ORAL at 21:26

## 2025-02-16 RX ADMIN — ALBUTEROL SULFATE 2.5 MG: 2.5 SOLUTION RESPIRATORY (INHALATION) at 20:58

## 2025-02-16 RX ADMIN — METHYLPREDNISOLONE SODIUM SUCCINATE 40 MG: 40 INJECTION, POWDER, FOR SOLUTION INTRAMUSCULAR; INTRAVENOUS at 08:11

## 2025-02-16 RX ADMIN — GABAPENTIN 300 MG: 300 CAPSULE ORAL at 08:11

## 2025-02-16 RX ADMIN — METHYLPREDNISOLONE SODIUM SUCCINATE 40 MG: 40 INJECTION, POWDER, FOR SOLUTION INTRAMUSCULAR; INTRAVENOUS at 00:46

## 2025-02-16 RX ADMIN — GUAIFENESIN 600 MG: 600 TABLET, EXTENDED RELEASE ORAL at 21:26

## 2025-02-16 RX ADMIN — ASPIRIN 81 MG: 81 TABLET, CHEWABLE ORAL at 08:11

## 2025-02-16 RX ADMIN — TIOTROPIUM BROMIDE INHALATION SPRAY 2 PUFF: 3.12 SPRAY, METERED RESPIRATORY (INHALATION) at 08:26

## 2025-02-16 RX ADMIN — OSELTAMIVIR PHOSPHATE 75 MG: 75 CAPSULE ORAL at 21:26

## 2025-02-16 RX ADMIN — CARVEDILOL 12.5 MG: 12.5 TABLET, FILM COATED ORAL at 16:42

## 2025-02-16 RX ADMIN — INSULIN LISPRO 4 UNITS: 100 INJECTION, SOLUTION INTRAVENOUS; SUBCUTANEOUS at 16:42

## 2025-02-16 RX ADMIN — GLIPIZIDE 5 MG: 5 TABLET ORAL at 08:11

## 2025-02-16 RX ADMIN — HYDRALAZINE HYDROCHLORIDE 10 MG: 20 INJECTION INTRAMUSCULAR; INTRAVENOUS at 21:46

## 2025-02-16 RX ADMIN — ATORVASTATIN CALCIUM 20 MG: 10 TABLET, FILM COATED ORAL at 21:26

## 2025-02-16 RX ADMIN — BUDESONIDE AND FORMOTEROL FUMARATE DIHYDRATE 2 PUFF: 80; 4.5 AEROSOL RESPIRATORY (INHALATION) at 20:58

## 2025-02-16 RX ADMIN — METHYLPREDNISOLONE SODIUM SUCCINATE 40 MG: 40 INJECTION, POWDER, FOR SOLUTION INTRAMUSCULAR; INTRAVENOUS at 16:42

## 2025-02-16 RX ADMIN — HEPARIN SODIUM 18 UNITS/KG/HR: 10000 INJECTION, SOLUTION INTRAVENOUS at 18:18

## 2025-02-16 RX ADMIN — ALBUTEROL SULFATE 2.5 MG: 2.5 SOLUTION RESPIRATORY (INHALATION) at 14:34

## 2025-02-16 RX ADMIN — INSULIN LISPRO 4 UNITS: 100 INJECTION, SOLUTION INTRAVENOUS; SUBCUTANEOUS at 12:33

## 2025-02-16 RX ADMIN — GUAIFENESIN 600 MG: 600 TABLET, EXTENDED RELEASE ORAL at 08:11

## 2025-02-16 RX ADMIN — INSULIN LISPRO 4 UNITS: 100 INJECTION, SOLUTION INTRAVENOUS; SUBCUTANEOUS at 21:36

## 2025-02-16 RX ADMIN — CARVEDILOL 12.5 MG: 12.5 TABLET, FILM COATED ORAL at 08:11

## 2025-02-16 RX ADMIN — INSULIN LISPRO 4 UNITS: 100 INJECTION, SOLUTION INTRAVENOUS; SUBCUTANEOUS at 09:01

## 2025-02-16 RX ADMIN — OSELTAMIVIR PHOSPHATE 75 MG: 75 CAPSULE ORAL at 08:11

## 2025-02-16 RX ADMIN — FENOFIBRATE 160 MG: 160 TABLET ORAL at 08:11

## 2025-02-16 RX ADMIN — ALBUTEROL SULFATE 2.5 MG: 2.5 SOLUTION RESPIRATORY (INHALATION) at 08:26

## 2025-02-16 RX ADMIN — AMLODIPINE BESYLATE 5 MG: 5 TABLET ORAL at 08:11

## 2025-02-16 RX ADMIN — BUDESONIDE AND FORMOTEROL FUMARATE DIHYDRATE 2 PUFF: 80; 4.5 AEROSOL RESPIRATORY (INHALATION) at 08:26

## 2025-02-16 RX ADMIN — HEPARIN SODIUM 18 UNITS/KG/HR: 10000 INJECTION, SOLUTION INTRAVENOUS at 03:15

## 2025-02-16 RX ADMIN — METFORMIN HYDROCHLORIDE 1000 MG: 500 TABLET, EXTENDED RELEASE ORAL at 08:11

## 2025-02-16 ASSESSMENT — PAIN SCALES - GENERAL: PAINLEVEL_OUTOF10: 0

## 2025-02-17 ENCOUNTER — APPOINTMENT (OUTPATIENT)
Dept: NUCLEAR MEDICINE | Age: 60
DRG: 193 | End: 2025-02-17
Payer: COMMERCIAL

## 2025-02-17 ENCOUNTER — APPOINTMENT (OUTPATIENT)
Dept: VASCULAR LAB | Age: 60
DRG: 193 | End: 2025-02-17
Attending: HOSPITALIST
Payer: COMMERCIAL

## 2025-02-17 ENCOUNTER — APPOINTMENT (OUTPATIENT)
Dept: CT IMAGING | Age: 60
DRG: 193 | End: 2025-02-17
Payer: COMMERCIAL

## 2025-02-17 ENCOUNTER — APPOINTMENT (OUTPATIENT)
Dept: GENERAL RADIOLOGY | Age: 60
DRG: 193 | End: 2025-02-17
Payer: COMMERCIAL

## 2025-02-17 LAB
ANION GAP SERPL CALCULATED.3IONS-SCNC: 8 MMOL/L (ref 9–17)
ANION GAP SERPL CALCULATED.3IONS-SCNC: 9 MMOL/L (ref 9–17)
ANTI-XA UNFRAC HEPARIN: 0.36 IU/L (ref 0.3–0.7)
BUN SERPL-MCNC: 37 MG/DL (ref 6–20)
BUN SERPL-MCNC: 38 MG/DL (ref 6–20)
CALCIUM SERPL-MCNC: 8.7 MG/DL (ref 8.6–10.4)
CALCIUM SERPL-MCNC: 8.8 MG/DL (ref 8.6–10.4)
CHLORIDE SERPL-SCNC: 101 MMOL/L (ref 98–107)
CHLORIDE SERPL-SCNC: 101 MMOL/L (ref 98–107)
CO2 SERPL-SCNC: 23 MMOL/L (ref 20–31)
CO2 SERPL-SCNC: 26 MMOL/L (ref 20–31)
CREAT SERPL-MCNC: 1.2 MG/DL (ref 0.7–1.2)
CREAT SERPL-MCNC: 1.3 MG/DL (ref 0.7–1.2)
ECHO BSA: 2.23 M2
ERYTHROCYTE [DISTWIDTH] IN BLOOD BY AUTOMATED COUNT: 12.4 % (ref 12.5–15.4)
GFR, ESTIMATED: 63 ML/MIN/1.73M2
GFR, ESTIMATED: 70 ML/MIN/1.73M2
GLUCOSE BLD-MCNC: 114 MG/DL (ref 75–110)
GLUCOSE BLD-MCNC: 130 MG/DL (ref 75–110)
GLUCOSE BLD-MCNC: 204 MG/DL (ref 75–110)
GLUCOSE BLD-MCNC: 208 MG/DL (ref 75–110)
GLUCOSE BLD-MCNC: 336 MG/DL (ref 75–110)
GLUCOSE SERPL-MCNC: 189 MG/DL (ref 70–99)
GLUCOSE SERPL-MCNC: 218 MG/DL (ref 70–99)
HCT VFR BLD AUTO: 35.4 % (ref 41–53)
HGB BLD-MCNC: 12.1 G/DL (ref 13.5–17.5)
MCH RBC QN AUTO: 31.4 PG (ref 26–34)
MCHC RBC AUTO-ENTMCNC: 34.3 G/DL (ref 31–37)
MCV RBC AUTO: 91.6 FL (ref 80–100)
PLATELET # BLD AUTO: 187 K/UL (ref 140–450)
PMV BLD AUTO: 10 FL (ref 6–12)
POTASSIUM SERPL-SCNC: 5.1 MMOL/L (ref 3.7–5.3)
POTASSIUM SERPL-SCNC: 5.1 MMOL/L (ref 3.7–5.3)
RBC # BLD AUTO: 3.86 M/UL (ref 4.5–5.9)
SODIUM SERPL-SCNC: 133 MMOL/L (ref 135–144)
SODIUM SERPL-SCNC: 135 MMOL/L (ref 135–144)
WBC OTHER # BLD: 8.5 K/UL (ref 3.5–11)

## 2025-02-17 PROCEDURE — 80048 BASIC METABOLIC PNL TOTAL CA: CPT

## 2025-02-17 PROCEDURE — 2500000003 HC RX 250 WO HCPCS: Performed by: HOSPITALIST

## 2025-02-17 PROCEDURE — 99232 SBSQ HOSP IP/OBS MODERATE 35: CPT

## 2025-02-17 PROCEDURE — 85520 HEPARIN ASSAY: CPT

## 2025-02-17 PROCEDURE — 71045 X-RAY EXAM CHEST 1 VIEW: CPT

## 2025-02-17 PROCEDURE — 2500000003 HC RX 250 WO HCPCS

## 2025-02-17 PROCEDURE — A9540 TC99M MAA: HCPCS | Performed by: NURSE PRACTITIONER

## 2025-02-17 PROCEDURE — 6360000002 HC RX W HCPCS: Performed by: HOSPITALIST

## 2025-02-17 PROCEDURE — 6370000000 HC RX 637 (ALT 250 FOR IP)

## 2025-02-17 PROCEDURE — 78580 LUNG PERFUSION IMAGING: CPT

## 2025-02-17 PROCEDURE — 93970 EXTREMITY STUDY: CPT

## 2025-02-17 PROCEDURE — 94640 AIRWAY INHALATION TREATMENT: CPT

## 2025-02-17 PROCEDURE — 2700000000 HC OXYGEN THERAPY PER DAY

## 2025-02-17 PROCEDURE — 85027 COMPLETE CBC AUTOMATED: CPT

## 2025-02-17 PROCEDURE — 6370000000 HC RX 637 (ALT 250 FOR IP): Performed by: INTERNAL MEDICINE

## 2025-02-17 PROCEDURE — 6360000002 HC RX W HCPCS: Performed by: NURSE PRACTITIONER

## 2025-02-17 PROCEDURE — 93970 EXTREMITY STUDY: CPT | Performed by: SURGERY

## 2025-02-17 PROCEDURE — 6360000002 HC RX W HCPCS: Performed by: INTERNAL MEDICINE

## 2025-02-17 PROCEDURE — 2500000003 HC RX 250 WO HCPCS: Performed by: NURSE PRACTITIONER

## 2025-02-17 PROCEDURE — 2580000003 HC RX 258: Performed by: HOSPITALIST

## 2025-02-17 PROCEDURE — 82947 ASSAY GLUCOSE BLOOD QUANT: CPT

## 2025-02-17 PROCEDURE — 3430000000 HC RX DIAGNOSTIC RADIOPHARMACEUTICAL: Performed by: NURSE PRACTITIONER

## 2025-02-17 PROCEDURE — 94669 MECHANICAL CHEST WALL OSCILL: CPT

## 2025-02-17 PROCEDURE — 94761 N-INVAS EAR/PLS OXIMETRY MLT: CPT

## 2025-02-17 PROCEDURE — 6360000004 HC RX CONTRAST MEDICATION: Performed by: HOSPITALIST

## 2025-02-17 PROCEDURE — 71260 CT THORAX DX C+: CPT

## 2025-02-17 PROCEDURE — 36415 COLL VENOUS BLD VENIPUNCTURE: CPT

## 2025-02-17 PROCEDURE — 99232 SBSQ HOSP IP/OBS MODERATE 35: CPT | Performed by: HOSPITALIST

## 2025-02-17 PROCEDURE — 6370000000 HC RX 637 (ALT 250 FOR IP): Performed by: HOSPITALIST

## 2025-02-17 PROCEDURE — 6370000000 HC RX 637 (ALT 250 FOR IP): Performed by: NURSE PRACTITIONER

## 2025-02-17 PROCEDURE — 2060000000 HC ICU INTERMEDIATE R&B

## 2025-02-17 RX ORDER — AMLODIPINE BESYLATE 10 MG/1
10 TABLET ORAL DAILY
Status: DISCONTINUED | OUTPATIENT
Start: 2025-02-18 | End: 2025-02-18 | Stop reason: HOSPADM

## 2025-02-17 RX ORDER — SODIUM CHLORIDE 9 MG/ML
INJECTION, SOLUTION INTRAVENOUS CONTINUOUS
Status: DISCONTINUED | OUTPATIENT
Start: 2025-02-17 | End: 2025-02-18 | Stop reason: HOSPADM

## 2025-02-17 RX ORDER — 0.9 % SODIUM CHLORIDE 0.9 %
80 INTRAVENOUS SOLUTION INTRAVENOUS ONCE
Status: DISCONTINUED | OUTPATIENT
Start: 2025-02-17 | End: 2025-02-18 | Stop reason: HOSPADM

## 2025-02-17 RX ORDER — IOPAMIDOL 755 MG/ML
75 INJECTION, SOLUTION INTRAVASCULAR
Status: COMPLETED | OUTPATIENT
Start: 2025-02-17 | End: 2025-02-17

## 2025-02-17 RX ORDER — ALBUTEROL SULFATE 0.83 MG/ML
2.5 SOLUTION RESPIRATORY (INHALATION)
Status: DISCONTINUED | OUTPATIENT
Start: 2025-02-17 | End: 2025-02-18 | Stop reason: HOSPADM

## 2025-02-17 RX ORDER — SODIUM CHLORIDE 0.9 % (FLUSH) 0.9 %
10 SYRINGE (ML) INJECTION PRN
Status: DISCONTINUED | OUTPATIENT
Start: 2025-02-17 | End: 2025-02-18 | Stop reason: HOSPADM

## 2025-02-17 RX ORDER — ENOXAPARIN SODIUM 100 MG/ML
40 INJECTION SUBCUTANEOUS DAILY
Status: DISCONTINUED | OUTPATIENT
Start: 2025-02-18 | End: 2025-02-18 | Stop reason: HOSPADM

## 2025-02-17 RX ADMIN — SODIUM CHLORIDE: 9 INJECTION, SOLUTION INTRAVENOUS at 16:06

## 2025-02-17 RX ADMIN — CARVEDILOL 12.5 MG: 12.5 TABLET, FILM COATED ORAL at 10:34

## 2025-02-17 RX ADMIN — SODIUM CHLORIDE, PRESERVATIVE FREE 20 ML: 5 INJECTION INTRAVENOUS at 11:17

## 2025-02-17 RX ADMIN — METFORMIN HYDROCHLORIDE 1000 MG: 500 TABLET, EXTENDED RELEASE ORAL at 20:09

## 2025-02-17 RX ADMIN — METHYLPREDNISOLONE SODIUM SUCCINATE 40 MG: 40 INJECTION, POWDER, FOR SOLUTION INTRAMUSCULAR; INTRAVENOUS at 10:37

## 2025-02-17 RX ADMIN — INSULIN LISPRO 4 UNITS: 100 INJECTION, SOLUTION INTRAVENOUS; SUBCUTANEOUS at 17:44

## 2025-02-17 RX ADMIN — INSULIN LISPRO 12 UNITS: 100 INJECTION, SOLUTION INTRAVENOUS; SUBCUTANEOUS at 21:06

## 2025-02-17 RX ADMIN — GUAIFENESIN 600 MG: 600 TABLET, EXTENDED RELEASE ORAL at 10:35

## 2025-02-17 RX ADMIN — GABAPENTIN 300 MG: 300 CAPSULE ORAL at 10:36

## 2025-02-17 RX ADMIN — TIOTROPIUM BROMIDE INHALATION SPRAY 2 PUFF: 3.12 SPRAY, METERED RESPIRATORY (INHALATION) at 09:43

## 2025-02-17 RX ADMIN — ALBUTEROL SULFATE 2.5 MG: 2.5 SOLUTION RESPIRATORY (INHALATION) at 16:21

## 2025-02-17 RX ADMIN — FENOFIBRATE 160 MG: 160 TABLET ORAL at 10:36

## 2025-02-17 RX ADMIN — CARVEDILOL 12.5 MG: 12.5 TABLET, FILM COATED ORAL at 17:44

## 2025-02-17 RX ADMIN — METFORMIN HYDROCHLORIDE 1000 MG: 500 TABLET, EXTENDED RELEASE ORAL at 10:35

## 2025-02-17 RX ADMIN — ALBUTEROL SULFATE 2.5 MG: 2.5 SOLUTION RESPIRATORY (INHALATION) at 09:43

## 2025-02-17 RX ADMIN — BUDESONIDE AND FORMOTEROL FUMARATE DIHYDRATE 2 PUFF: 80; 4.5 AEROSOL RESPIRATORY (INHALATION) at 09:43

## 2025-02-17 RX ADMIN — METHYLPREDNISOLONE SODIUM SUCCINATE 40 MG: 40 INJECTION, POWDER, FOR SOLUTION INTRAMUSCULAR; INTRAVENOUS at 00:05

## 2025-02-17 RX ADMIN — METHYLPREDNISOLONE SODIUM SUCCINATE 40 MG: 40 INJECTION, POWDER, FOR SOLUTION INTRAMUSCULAR; INTRAVENOUS at 23:28

## 2025-02-17 RX ADMIN — GUAIFENESIN 600 MG: 600 TABLET, EXTENDED RELEASE ORAL at 20:10

## 2025-02-17 RX ADMIN — HYDRALAZINE HYDROCHLORIDE 10 MG: 20 INJECTION INTRAMUSCULAR; INTRAVENOUS at 05:05

## 2025-02-17 RX ADMIN — OSELTAMIVIR PHOSPHATE 75 MG: 75 CAPSULE ORAL at 10:34

## 2025-02-17 RX ADMIN — ALBUTEROL SULFATE 2.5 MG: 2.5 SOLUTION RESPIRATORY (INHALATION) at 20:00

## 2025-02-17 RX ADMIN — Medication 80 ML: at 15:33

## 2025-02-17 RX ADMIN — OSELTAMIVIR PHOSPHATE 75 MG: 75 CAPSULE ORAL at 20:09

## 2025-02-17 RX ADMIN — SODIUM CHLORIDE, PRESERVATIVE FREE 10 ML: 5 INJECTION INTRAVENOUS at 15:33

## 2025-02-17 RX ADMIN — ATORVASTATIN CALCIUM 20 MG: 10 TABLET, FILM COATED ORAL at 20:09

## 2025-02-17 RX ADMIN — Medication 5.5 MILLICURIE: at 11:17

## 2025-02-17 RX ADMIN — HYDRALAZINE HYDROCHLORIDE 10 MG: 20 INJECTION INTRAMUSCULAR; INTRAVENOUS at 20:10

## 2025-02-17 RX ADMIN — BUDESONIDE AND FORMOTEROL FUMARATE DIHYDRATE 2 PUFF: 80; 4.5 AEROSOL RESPIRATORY (INHALATION) at 20:00

## 2025-02-17 RX ADMIN — METHYLPREDNISOLONE SODIUM SUCCINATE 40 MG: 40 INJECTION, POWDER, FOR SOLUTION INTRAMUSCULAR; INTRAVENOUS at 15:53

## 2025-02-17 RX ADMIN — IOPAMIDOL 75 ML: 755 INJECTION, SOLUTION INTRAVENOUS at 15:33

## 2025-02-17 RX ADMIN — SODIUM CHLORIDE, PRESERVATIVE FREE 10 ML: 5 INJECTION INTRAVENOUS at 10:33

## 2025-02-17 RX ADMIN — GLIPIZIDE 5 MG: 5 TABLET ORAL at 05:04

## 2025-02-17 RX ADMIN — SODIUM CHLORIDE, PRESERVATIVE FREE 10 ML: 5 INJECTION INTRAVENOUS at 20:10

## 2025-02-17 RX ADMIN — ASPIRIN 81 MG: 81 TABLET, CHEWABLE ORAL at 10:34

## 2025-02-17 RX ADMIN — HEPARIN SODIUM 18 UNITS/KG/HR: 10000 INJECTION, SOLUTION INTRAVENOUS at 07:30

## 2025-02-17 NOTE — CARE COORDINATION
[facility] Quality Flow/Interdisciplinary Rounds Progress Note    Quality Flow Rounds held on February 17, 2025 at 0930    Disciplines Attending:  Bedside Nurse, , , and Nursing Unit Leadership    Barriers to Discharge: clinical status    Anticipated Discharge Date:  TBD    Anticipated Discharge Disposition: home    [facility] RISK OF UNPLANNED READMISSION 2.0             10.2 Total Score        Discussed patient goal for the day, patient clinical progression, and barriers to discharge.  Patient's oxygen requirements increased to 10L NC over the weekend. He is back down to 2L. VQ scan this morning is positive for PE. Patient is on a Heparin drip. Awaiting lower extremity dopplers. Cardiac cath can be done outpatient. CM will continue to follow for discharge planning.      Neeta Thomas  February 17, 2025

## 2025-02-17 NOTE — RT PROTOCOL NOTE
RT Inhaler-Nebulizer Bronchodilator Protocol Note    There is a bronchodilator order in the chart from a provider indicating to follow the RT Bronchodilator Protocol and there is an “Initiate RT Inhaler-Nebulizer Bronchodilator Protocol” order as well (see protocol at bottom of note).    CXR Findings:  XR CHEST PORTABLE    Result Date: 2/14/2025  Peripheral left basilar airspace opacity suspicious for pneumonia.       The findings from the last RT Protocol Assessment were as follows:   History Pulmonary Disease: Chronic pulmonary disease  Respiratory Pattern: Dyspnea on exertion or RR 21-25 bpm  Breath Sounds: Intermittent or unilateral wheezes  Cough: Strong, productive  Indication for Bronchodilator Therapy: On home bronchodilators  Bronchodilator Assessment Score: 9    Aerosolized bronchodilator medication orders have been revised according to the RT Inhaler-Nebulizer Bronchodilator Protocol below.    Respiratory Therapist to perform RT Therapy Protocol Assessment initially then follow the protocol.  Repeat RT Therapy Protocol Assessment PRN for score 0-3 or on second treatment, BID, and PRN for scores above 3.    No Indications - adjust the frequency to every 6 hours PRN wheezing or bronchospasm, if no treatments needed after 48 hours then discontinue using Per Protocol order mode.     If indication present, adjust the RT bronchodilator orders based on the Bronchodilator Assessment Score as indicated below.  Use Inhaler orders unless patient has one or more of the following: on home nebulizer, not able to hold breath for 10 seconds, is not alert and oriented, cannot activate and use MDI correctly, or respiratory rate 25 breaths per minute or more, then use the equivalent nebulizer order(s) with same Frequency and PRN reasons based on the score.  If a patient is on this medication at home then do not decrease Frequency below that used at home.    0-3 - enter or revise RT bronchodilator order(s) to equivalent RT

## 2025-02-17 NOTE — RT PROTOCOL NOTE
RT Inhaler-Nebulizer Bronchodilator Protocol Note    There is a bronchodilator order in the chart from a provider indicating to follow the RT Bronchodilator Protocol and there is an “Initiate RT Inhaler-Nebulizer Bronchodilator Protocol” order as well (see protocol at bottom of note).    CXR Findings:  XR CHEST PORTABLE    Result Date: 2/17/2025  Improved left basilar opacification, possibly resolving infiltrate or atelectasis.  Small left pleural effusion.       The findings from the last RT Protocol Assessment were as follows:   History Pulmonary Disease: Chronic pulmonary disease  Respiratory Pattern: Dyspnea on exertion or RR 21-25 bpm  Breath Sounds: Intermittent or unilateral wheezes  Cough: Strong, productive  Indication for Bronchodilator Therapy: On home bronchodilators  Bronchodilator Assessment Score: 9    Aerosolized bronchodilator medication orders have been revised according to the RT Inhaler-Nebulizer Bronchodilator Protocol below.    Respiratory Therapist to perform RT Therapy Protocol Assessment initially then follow the protocol.  Repeat RT Therapy Protocol Assessment PRN for score 0-3 or on second treatment, BID, and PRN for scores above 3.    No Indications - adjust the frequency to every 6 hours PRN wheezing or bronchospasm, if no treatments needed after 48 hours then discontinue using Per Protocol order mode.     If indication present, adjust the RT bronchodilator orders based on the Bronchodilator Assessment Score as indicated below.  Use Inhaler orders unless patient has one or more of the following: on home nebulizer, not able to hold breath for 10 seconds, is not alert and oriented, cannot activate and use MDI correctly, or respiratory rate 25 breaths per minute or more, then use the equivalent nebulizer order(s) with same Frequency and PRN reasons based on the score.  If a patient is on this medication at home then do not decrease Frequency below that used at home.    0-3 - enter or revise

## 2025-02-18 VITALS
WEIGHT: 215.83 LBS | HEIGHT: 71 IN | RESPIRATION RATE: 17 BRPM | SYSTOLIC BLOOD PRESSURE: 187 MMHG | OXYGEN SATURATION: 92 % | DIASTOLIC BLOOD PRESSURE: 81 MMHG | TEMPERATURE: 97.9 F | HEART RATE: 89 BPM | BODY MASS INDEX: 30.22 KG/M2

## 2025-02-18 LAB
ANION GAP SERPL CALCULATED.3IONS-SCNC: 8 MMOL/L (ref 9–17)
BUN SERPL-MCNC: 36 MG/DL (ref 6–20)
CALCIUM SERPL-MCNC: 8.8 MG/DL (ref 8.6–10.4)
CHLORIDE SERPL-SCNC: 103 MMOL/L (ref 98–107)
CO2 SERPL-SCNC: 26 MMOL/L (ref 20–31)
CREAT SERPL-MCNC: 1.1 MG/DL (ref 0.7–1.2)
GFR, ESTIMATED: 77 ML/MIN/1.73M2
GLUCOSE BLD-MCNC: 124 MG/DL (ref 75–110)
GLUCOSE BLD-MCNC: 198 MG/DL (ref 75–110)
GLUCOSE BLD-MCNC: 288 MG/DL (ref 75–110)
GLUCOSE SERPL-MCNC: 162 MG/DL (ref 70–99)
POTASSIUM SERPL-SCNC: 4.5 MMOL/L (ref 3.7–5.3)
SODIUM SERPL-SCNC: 137 MMOL/L (ref 135–144)

## 2025-02-18 PROCEDURE — 82947 ASSAY GLUCOSE BLOOD QUANT: CPT

## 2025-02-18 PROCEDURE — 99238 HOSP IP/OBS DSCHRG MGMT 30/<: CPT | Performed by: STUDENT IN AN ORGANIZED HEALTH CARE EDUCATION/TRAINING PROGRAM

## 2025-02-18 PROCEDURE — 6360000002 HC RX W HCPCS: Performed by: HOSPITALIST

## 2025-02-18 PROCEDURE — 2700000000 HC OXYGEN THERAPY PER DAY

## 2025-02-18 PROCEDURE — 94761 N-INVAS EAR/PLS OXIMETRY MLT: CPT

## 2025-02-18 PROCEDURE — 94618 PULMONARY STRESS TESTING: CPT

## 2025-02-18 PROCEDURE — 99232 SBSQ HOSP IP/OBS MODERATE 35: CPT

## 2025-02-18 PROCEDURE — 6370000000 HC RX 637 (ALT 250 FOR IP): Performed by: NURSE PRACTITIONER

## 2025-02-18 PROCEDURE — 94640 AIRWAY INHALATION TREATMENT: CPT

## 2025-02-18 PROCEDURE — 36415 COLL VENOUS BLD VENIPUNCTURE: CPT

## 2025-02-18 PROCEDURE — 2500000003 HC RX 250 WO HCPCS

## 2025-02-18 PROCEDURE — 6370000000 HC RX 637 (ALT 250 FOR IP)

## 2025-02-18 PROCEDURE — 6360000002 HC RX W HCPCS: Performed by: NURSE PRACTITIONER

## 2025-02-18 PROCEDURE — 94669 MECHANICAL CHEST WALL OSCILL: CPT

## 2025-02-18 PROCEDURE — 6370000000 HC RX 637 (ALT 250 FOR IP): Performed by: HOSPITALIST

## 2025-02-18 PROCEDURE — 2580000003 HC RX 258: Performed by: HOSPITALIST

## 2025-02-18 PROCEDURE — 6360000002 HC RX W HCPCS: Performed by: INTERNAL MEDICINE

## 2025-02-18 PROCEDURE — 80048 BASIC METABOLIC PNL TOTAL CA: CPT

## 2025-02-18 RX ORDER — CARVEDILOL 12.5 MG/1
12.5 TABLET ORAL 2 TIMES DAILY WITH MEALS
Qty: 60 TABLET | Refills: 3 | Status: SHIPPED | OUTPATIENT
Start: 2025-02-18 | End: 2025-02-18 | Stop reason: HOSPADM

## 2025-02-18 RX ORDER — AMLODIPINE BESYLATE 10 MG/1
10 TABLET ORAL DAILY
Qty: 30 TABLET | Refills: 3 | Status: ON HOLD | OUTPATIENT
Start: 2025-02-18

## 2025-02-18 RX ORDER — PREDNISONE 20 MG/1
20 TABLET ORAL 2 TIMES DAILY
Qty: 10 TABLET | Refills: 0 | Status: ON HOLD | OUTPATIENT
Start: 2025-02-18 | End: 2025-02-23

## 2025-02-18 RX ORDER — LABETALOL HYDROCHLORIDE 5 MG/ML
20 INJECTION, SOLUTION INTRAVENOUS ONCE
Status: COMPLETED | OUTPATIENT
Start: 2025-02-18 | End: 2025-02-18

## 2025-02-18 RX ORDER — OSELTAMIVIR PHOSPHATE 75 MG/1
75 CAPSULE ORAL 2 TIMES DAILY
Qty: 2 CAPSULE | Refills: 0 | Status: SHIPPED | OUTPATIENT
Start: 2025-02-18 | End: 2025-02-19

## 2025-02-18 RX ORDER — CARVEDILOL 25 MG/1
25 TABLET ORAL 2 TIMES DAILY WITH MEALS
Qty: 60 TABLET | Refills: 3 | Status: ON HOLD | OUTPATIENT
Start: 2025-02-18

## 2025-02-18 RX ORDER — CARVEDILOL 12.5 MG/1
25 TABLET ORAL 2 TIMES DAILY WITH MEALS
Status: DISCONTINUED | OUTPATIENT
Start: 2025-02-18 | End: 2025-02-18 | Stop reason: HOSPADM

## 2025-02-18 RX ADMIN — SODIUM CHLORIDE: 9 INJECTION, SOLUTION INTRAVENOUS at 05:40

## 2025-02-18 RX ADMIN — BUDESONIDE AND FORMOTEROL FUMARATE DIHYDRATE 2 PUFF: 80; 4.5 AEROSOL RESPIRATORY (INHALATION) at 07:55

## 2025-02-18 RX ADMIN — CARVEDILOL 25 MG: 12.5 TABLET, FILM COATED ORAL at 16:31

## 2025-02-18 RX ADMIN — ENOXAPARIN SODIUM 40 MG: 100 INJECTION SUBCUTANEOUS at 09:04

## 2025-02-18 RX ADMIN — METHYLPREDNISOLONE SODIUM SUCCINATE 40 MG: 40 INJECTION, POWDER, FOR SOLUTION INTRAMUSCULAR; INTRAVENOUS at 09:05

## 2025-02-18 RX ADMIN — METFORMIN HYDROCHLORIDE 1000 MG: 500 TABLET, EXTENDED RELEASE ORAL at 09:05

## 2025-02-18 RX ADMIN — GUAIFENESIN 600 MG: 600 TABLET, EXTENDED RELEASE ORAL at 09:13

## 2025-02-18 RX ADMIN — ALBUTEROL SULFATE 2.5 MG: 2.5 SOLUTION RESPIRATORY (INHALATION) at 07:55

## 2025-02-18 RX ADMIN — ALBUTEROL SULFATE 2.5 MG: 2.5 SOLUTION RESPIRATORY (INHALATION) at 15:20

## 2025-02-18 RX ADMIN — HYDRALAZINE HYDROCHLORIDE 10 MG: 20 INJECTION INTRAMUSCULAR; INTRAVENOUS at 13:38

## 2025-02-18 RX ADMIN — INSULIN LISPRO 8 UNITS: 100 INJECTION, SOLUTION INTRAVENOUS; SUBCUTANEOUS at 13:14

## 2025-02-18 RX ADMIN — AMLODIPINE BESYLATE 10 MG: 10 TABLET ORAL at 09:05

## 2025-02-18 RX ADMIN — GABAPENTIN 300 MG: 300 CAPSULE ORAL at 09:05

## 2025-02-18 RX ADMIN — FENOFIBRATE 160 MG: 160 TABLET ORAL at 09:06

## 2025-02-18 RX ADMIN — ASPIRIN 81 MG: 81 TABLET, CHEWABLE ORAL at 09:05

## 2025-02-18 RX ADMIN — ALBUTEROL SULFATE 2.5 MG: 2.5 SOLUTION RESPIRATORY (INHALATION) at 11:36

## 2025-02-18 RX ADMIN — SODIUM CHLORIDE, PRESERVATIVE FREE 10 ML: 5 INJECTION INTRAVENOUS at 09:07

## 2025-02-18 RX ADMIN — METHYLPREDNISOLONE SODIUM SUCCINATE 40 MG: 40 INJECTION, POWDER, FOR SOLUTION INTRAMUSCULAR; INTRAVENOUS at 16:31

## 2025-02-18 RX ADMIN — OSELTAMIVIR PHOSPHATE 75 MG: 75 CAPSULE ORAL at 09:06

## 2025-02-18 RX ADMIN — Medication 20 MG: at 00:51

## 2025-02-18 RX ADMIN — GLIPIZIDE 5 MG: 5 TABLET ORAL at 05:40

## 2025-02-18 RX ADMIN — TIOTROPIUM BROMIDE INHALATION SPRAY 2 PUFF: 3.12 SPRAY, METERED RESPIRATORY (INHALATION) at 07:55

## 2025-02-18 NOTE — CARE COORDINATION
Patient qualifies for home O2. Discussed DME options with patient and he would like a referral sent to [employer], as they supply his workplace. Referral faxed over and notified Cristina. Patient is discharging home today with girlfriend to transport. States she will be here at 6pm to pick him up.     Patient requesting POC so he can still work. [employer] to Dr. Cary to add to order.     1318: Order confirmed with [employer]. They will drop off POC at bedside to patient before he leaves the hospital

## 2025-02-18 NOTE — DISCHARGE SUMMARY
[facility]  Office: [phone]  Lemuel Miramontes DO, Galo Perez DO, Jensen Jacobo DO, Franky Snow DO, Caleb Rodriguez MD, Fatmata Purdy MD, Chyna Arauz MD, Sanam Gonzalez MD,  Speedy Matthew MD, Tl Santos MD, Bonnie Alexander MD,  Neil Trimble DO, Maryam English MD, Anurag Cary MD, Vipul Miramontes DO, Coby Rizzo MD,  Herman Junior DO, Yasmeen Paul MD, Verito Quezada MD, Ora Fairchild MD, Maria C Layton MD,  Jad Riley MD, Jessica Wheat MD, Paresh Stewart MD, Luis Weaver MD, Ramesh Bloom MD, Tommy Epps MD, Jose De Luna DO, Christopher Bocanegra MD, Neil Pedro MD, Mohsin Reza, MD, Shirley Waterhouse, CNP,  Lynette Evans CNP, Jose Berger, CNP,  Renetta Parisi, DNP, Talia Baer, CNP, Stephanie Briggs, CNP, Kelli Angel, CNP, Saundra Franklin, CNP, Chantell Milligan PA-C, Betina Garcia, CNP, Cece Crawford, CNP,  Inessa Barr, CNP, Estrellita Murcia, CNP, Diane Hernandez, CNP,  Anastasiya Castillo, CNS, Yoli Ivy CNP, Ritu Sarabia, CNP,   Bina Monge, CNP         [facility]   IN-PATIENT SERVICE   [facility]    Discharge Summary     Patient ID: Kiel Day  :  1965   MRN: [id number]     ACCOUNT:  [id number]   Patient's PCP: Angelita Walters MD  Admit Date: 2025   Discharge Date: 2025  Length of Stay: 4  Code Status:  Full Code  Admitting Physician: Vipul Miramontes DO  Discharge Physician: Anurag Cary MD     Active Discharge Diagnoses:     Hospital Problem Lists:  Principal Problem:    Chest pain of uncertain etiology  Active Problems:    NSTEMI (non-ST elevated myocardial infarction) (HCC)    Hypertensive urgency    Controlled type 2 diabetes mellitus without complication, without long-term current use of insulin (HCC)    Essential hypertension    Tobacco use    Mixed hyperlipidemia    Syncope    Influenza A  Resolved Problems:    * No resolved hospital problems. *      Admission Condition:  poor     Discharged

## 2025-02-18 NOTE — PROGRESS NOTES
Cardiology Progress Note                     Date:   2/13/2025  Patient name: Kiel Day  Date of admission:  2/12/2025  2:01 AM  MRN:   [id number]  YOB: 1965  PCP: Angelita Walters MD    Reason for Admission:  syncope, URI     Subjective:      Patient was febrile overnight with t max of 102.7 F, reports increased dyspnea and cough, no chest pain, BP is better controlled this am. Cr increased to 1.5 this am.       Scheduled Meds:   aspirin  81 mg Oral Daily    atorvastatin  20 mg Oral Daily    fenofibrate  160 mg Oral Daily    budesonide-formoterol  2 puff Inhalation BID RT    And    tiotropium  2 puff Inhalation Daily RT    gabapentin  300 mg Oral Daily    glipiZIDE  5 mg Oral QAM AC    [Held by provider] losartan  50 mg Oral Daily    metFORMIN  1,000 mg Oral BID    sodium chloride flush  5-40 mL IntraVENous 2 times per day    enoxaparin  40 mg SubCUTAneous Daily    nicotine  1 patch TransDERmal Daily    carvedilol  12.5 mg Oral BID WC    albuterol  2.5 mg Nebulization TID RT       Continuous Infusions:   sodium chloride      sodium chloride 75 mL/hr at 02/12/25 1838       Labs:     CBC:   Recent Labs     02/12/25 0210 02/13/25  0657   WBC 5.4 7.5   HGB 14.0 12.4*    136*     BMP:    Recent Labs     02/12/25 0210 02/13/25  0657    137   K 4.7 4.3    104   CO2 26 25   BUN 19 27*   CREATININE 1.4* 1.5*   GLUCOSE 128* 120*     Hepatic:   Recent Labs     02/12/25 0210   AST 18   ALT 10   BILITOT 0.6   ALKPHOS 106     Troponin: No results for input(s): \"TROPONINI\" in the last 72 hours.  BNP: No results for input(s): \"BNP\" in the last 72 hours.  Lipids:   Recent Labs     02/13/25  0657   CHOL 156   HDL 31*     INR:   Recent Labs     02/12/25 0210   INR 1.1         Objective:     Vitals: BP (!) 144/67   Pulse 79   Temp 97.7 °F (36.5 °C) (Axillary)   Resp 22   Ht 1.803 m (5' 11\")   Wt 98.9 kg (218 lb)   SpO2 91%   BMI 30.40 kg/m²     General appearance: awake, 
               Cardiology Progress Note                     Date:   2/14/2025  Patient name: Kiel Day  Date of admission:  2/12/2025  2:01 AM  MRN:   [id number]  YOB: 1965  PCP: Angelita Walters MD    Reason for Admission:  syncope, URI     Subjective:      Patient was febrile overnight with t max of 100.8 F, reports improved dyspnea and cough, no chest pain, BP hypertensive this am. Cr 1.5> 1.3 this am.       Scheduled Meds:   oseltamivir  75 mg Oral BID    albuterol  2.5 mg Nebulization 4x Daily RT    aspirin  81 mg Oral Daily    atorvastatin  20 mg Oral Daily    fenofibrate  160 mg Oral Daily    budesonide-formoterol  2 puff Inhalation BID RT    And    tiotropium  2 puff Inhalation Daily RT    gabapentin  300 mg Oral Daily    glipiZIDE  5 mg Oral QAM AC    [Held by provider] losartan  50 mg Oral Daily    metFORMIN  1,000 mg Oral BID    sodium chloride flush  5-40 mL IntraVENous 2 times per day    enoxaparin  40 mg SubCUTAneous Daily    nicotine  1 patch TransDERmal Daily    carvedilol  12.5 mg Oral BID WC       Continuous Infusions:   sodium chloride      sodium chloride 75 mL/hr at 02/14/25 0835       Labs:     CBC:   Recent Labs     02/12/25  0210 02/13/25  0657 02/14/25  0733   WBC 5.4 7.5 7.2   HGB 14.0 12.4* 13.8    136* 138*     BMP:    Recent Labs     02/12/25  0210 02/13/25  0657 02/14/25  0733    137 137   K 4.7 4.3 4.5    104 101   CO2 26 25 27   BUN 19 27* 25*   CREATININE 1.4* 1.5* 1.3*   GLUCOSE 128* 120* 109*     Hepatic:   Recent Labs     02/12/25  0210   AST 18   ALT 10   BILITOT 0.6   ALKPHOS 106     Troponin: No results for input(s): \"TROPONINI\" in the last 72 hours.  BNP: No results for input(s): \"BNP\" in the last 72 hours.  Lipids:   Recent Labs     02/13/25  0657   CHOL 156   HDL 31*     INR:   Recent Labs     02/12/25  0210   INR 1.1         Objective:     Vitals: BP (!) 149/55   Pulse 85   Temp 99.1 °F (37.3 °C) (Oral)   Resp 18   Ht 1.803 m (5' 
               Cardiology Progress Note                     Date:   2/15/2025  Patient name: Kiel Day  Date of admission:  2/12/2025  2:01 AM  MRN:   [id number]  YOB: 1965  PCP: Angelita Walters MD    Reason for Admission:  syncope, URI     Subjective:      Patient has had increased o2 requirement upto 10 L ,now down to 7 L this afternoon, reports somewhat improvement in dyspnea and cough, denies any chest pain, hypertensive this am. Cr 1.5.       Scheduled Meds:   methylPREDNISolone  40 mg IntraVENous Q8H    guaiFENesin  600 mg Oral BID    insulin lispro  0-8 Units SubCUTAneous 4x Daily AC & HS    oseltamivir  75 mg Oral BID    albuterol  2.5 mg Nebulization 4x Daily RT    amLODIPine  5 mg Oral Daily    aspirin  81 mg Oral Daily    atorvastatin  20 mg Oral Daily    fenofibrate  160 mg Oral Daily    budesonide-formoterol  2 puff Inhalation BID RT    And    tiotropium  2 puff Inhalation Daily RT    gabapentin  300 mg Oral Daily    glipiZIDE  5 mg Oral QAM AC    [Held by provider] losartan  50 mg Oral Daily    metFORMIN  1,000 mg Oral BID    sodium chloride flush  5-40 mL IntraVENous 2 times per day    nicotine  1 patch TransDERmal Daily    carvedilol  12.5 mg Oral BID WC       Continuous Infusions:   dextrose      heparin (PORCINE) Infusion 18 Units/kg/hr (02/15/25 1439)    lactated ringers 100 mL/hr at 02/15/25 1640    sodium chloride         Labs:     CBC:   Recent Labs     02/13/25  0657 02/14/25  0733   WBC 7.5 7.2   HGB 12.4* 13.8   * 138*     BMP:    Recent Labs     02/13/25  0657 02/14/25  0733 02/15/25  1021    137 134*   K 4.3 4.5 4.9    101 101   CO2 25 27 25   BUN 27* 25* 26*   CREATININE 1.5* 1.3* 1.5*   GLUCOSE 120* 109* 252*     Hepatic:   No results for input(s): \"AST\", \"ALT\", \"BILITOT\", \"ALKPHOS\" in the last 72 hours.    Invalid input(s): \"ALB\"    Troponin: No results for input(s): \"TROPONINI\" in the last 72 hours.  BNP: No results for input(s): \"BNP\" in the last 
               Cardiology Progress Note                     Date:   2/17/2025  Patient name: Kiel Day  Date of admission:  2/12/2025  2:01 AM  MRN:   [id number]  YOB: 1965  PCP: Angelita Walters MD    Reason for Admission:  syncope, URI     Subjective:      No acute events overnight, on 2L O2 NC, reports improvement in dyspnea and cough, denies any chest pain, hypertensive       Scheduled Meds:   insulin lispro  0-16 Units SubCUTAneous 4x Daily AC & HS    albuterol  2.5 mg Nebulization TID RT    methylPREDNISolone  40 mg IntraVENous Q8H    guaiFENesin  600 mg Oral BID    oseltamivir  75 mg Oral BID    amLODIPine  5 mg Oral Daily    aspirin  81 mg Oral Daily    atorvastatin  20 mg Oral Daily    fenofibrate  160 mg Oral Daily    budesonide-formoterol  2 puff Inhalation BID RT    And    tiotropium  2 puff Inhalation Daily RT    gabapentin  300 mg Oral Daily    glipiZIDE  5 mg Oral QAM AC    [Held by provider] losartan  50 mg Oral Daily    metFORMIN  1,000 mg Oral BID    sodium chloride flush  5-40 mL IntraVENous 2 times per day    nicotine  1 patch TransDERmal Daily    carvedilol  12.5 mg Oral BID WC       Continuous Infusions:   dextrose      heparin (PORCINE) Infusion 18 Units/kg/hr (02/17/25 0730)    sodium chloride         Labs:     CBC:   Recent Labs     02/17/25  0659   WBC 8.5   HGB 12.1*        BMP:    Recent Labs     02/15/25  1021 02/16/25  0250   * 134*   K 4.9 5.2    100   CO2 25 25   BUN 26* 32*   CREATININE 1.5* 1.3*   GLUCOSE 252* 264*     Hepatic:   No results for input(s): \"AST\", \"ALT\", \"BILITOT\", \"ALKPHOS\" in the last 72 hours.    Invalid input(s): \"ALB\"    Troponin: No results for input(s): \"TROPONINI\" in the last 72 hours.  BNP: No results for input(s): \"BNP\" in the last 72 hours.  Lipids:   No results for input(s): \"CHOL\", \"HDL\" in the last 72 hours.    Invalid input(s): \"LDLCALCU\"    INR:   Recent Labs     02/15/25  1402   INR 1.0         Objective: 
    [facility] PULMONARY,CRITICAL CARE & SLEEP   Donciro Striclkand MD/Wander GIBSON AGAP-BC, NP-C      Elida GIBSON NP-C     Alex GIBSON NP-C                                          Pulmonary Progress Note    Patient - Kiel Day   Age - 59 y.o.   - 1965  MRN - [id number]  Acct # - [id number]  Date of Admission - 2025  2:01 AM    Consulting Service/Physician:       Primary Care Physician: Angelita Walters MD    SUBJECTIVE:     Chief Complaint:   Chief Complaint   Patient presents with    Loss of Consciousness     Subjective:    Kiel is seen sitting up in a chair.  He is on 9 L with oxygen saturation around 93%.  He looks comfortable.  He reports dyspnea with exertion.  He reports an occasional nonproductive cough.  He denies any hemoptysis.  Echocardiogram did show an EF of 50% with diastolic dysfunction.  He is currently receiving IV hydration with LR.  He is on Tamiflu.  Tmax for 24 hours was 99.3.  D-dimer was checked last night and was elevated at 2.73.  Creatinine yesterday 1.3.  No BMP was done this morning.  White blood cell count normal at 7.2.    He denies any chest pain or pleuritic type pain currently.  He denies any history of DVT or PE.  Chest x-ray does show some haziness to left base and right middle lobe.    He does continue on Tamiflu.    VITALS  /71   Pulse 72   Temp 98.4 °F (36.9 °C) (Oral)   Resp 18   Ht 1.803 m (5' 11\")   Wt 98.9 kg (218 lb)   SpO2 96%   BMI 30.40 kg/m²   Wt Readings from Last 3 Encounters:   25 98.9 kg (218 lb)   24 94.3 kg (208 lb)   23 103.9 kg (229 lb)     I/O (24 Hours)    Intake/Output Summary (Last 24 hours) at 2/15/2025 0861  Last data filed at 2/15/2025 0435  Gross per 24 hour   Intake 600 ml   Output --   Net 600 ml     Ventilator:      Exam:   Physical Exam   Constitutional: Sitting up in chair on 9 L in no acute distress  HENT: Unremarkable  Head: Normocephalic and 
    Per [facility] approved formulary policy.     SGLT2's are only formulary with the indication of CKD or CHF therefore:    Please note that the  Canagliflozin (Invokana) is non-formulary with indications of type 2 diabetes and has been discontinued while inpatient. If you feel the patient needs to continue their home therapy during the inpatient stay, the patient may bring their medication bottle for verification and administration pursuant to our home medication use policy.      Please contact the pharmacy with any questions or concerns.  Thank you.  Clarke Vaughan RPH, RPjavier/PharmD 2/12/2025 6:36 AM    
   02/14/25 0918   Care Plan - Respiratory Goals   Achieves optimal ventilation and oxygenation Assess for changes in respiratory status;Oxygen supplementation based on oxygen saturation or arterial blood gases;Assess and instruct to report shortness of breath or any respiratory difficulty;Respiratory therapy support as indicated;Encourage broncho-pulmonary hygiene including cough, deep breathe, incentive spirometry       
  Physician Progress Note      PATIENT:               MAISHA RAYGOZA  CSN #:                  [id number]  :                       1965  ADMIT DATE:       2025 2:01 AM  DISCH DATE:  RESPONDING  PROVIDER #:        Anurag Cary MD          QUERY TEXT:    Pt admitted with chest pain.  Noted documentation of NSTEMI on H&P and through   out progress notes and  cardiology PN noted as likely type 2 with flat   troponins, no anginal pain. If possible, please document in progress notes and   discharge summary:    The medical record reflects the following:    Risk Factors: Acute respiratory failure, CAD, FATOUMATA, dehydration, HTN, diastolic   CHF, DM2 w/ neuropathy, HTN urgency, Influenza A , Acute PE, HLD, Nicotine   dependent.    Clinical Indicators: c/o syncope and dizziness with fever chills cough   headaches for past several days.TROPONINS-63, 54, 53, 52; EKG- NSR w/ R BBB.   Cardiology PN -  - NSTEMI, mildly elevated troponins with a flat trend, no   active angina, likely type ii, underlying cad not excluded given risk   factors. RR- 16-22, Spo2 @ 89% w/ treatment and oxygen therapy increased for   Spo2 @ 90% or greater.    Treatment: Tamiflu, IV steroids, Heparin gtt,  Cardiology consulted and plan   for heart cath in 4 weeks as OP. Oxygen therapy 2L/NC to HFNC @ 10 L.    Thank You  Options provided:  -- Defer to cardiology consultant documentation regarding NSTEMI was ruled out   and NSTEMI type 2 confirmed  -- NSTEMI confirmed present on admission due to, please specify supporting   documentation for the diagnosis  -- NSTEMI was ruled out and NSTEMI type 2 confirmed  -- Other - I will add my own diagnosis  -- Disagree - Not applicable / Not valid  -- Disagree - Clinically unable to determine / Unknown  -- Refer to Clinical Documentation Reviewer    PROVIDER RESPONSE TEXT:    I defer to cardiology consultant documentation regarding NSTEMI was ruled out   and NSTEMI type 2 confirmed    Query created 
[facility]  Office: [phone]  Lemuel Miramontes DO, Galo Perez DO, Jensen Jacobo DO, Franky Snow DO, Caleb Rodriguez MD, Fatmata Purdy MD, Chyna Arauz MD, Sanam Gonzalez MD,  Speedy Matthew MD, Tl Santos MD, Bonnie Alexander MD,  Neil Trimble DO, Maryam English MD, Anurag Cary MD, Vipul Miramontes DO, Coby Rizzo MD,  Herman Junior DO, Yasmeen Paul MD, Verito Quezada MD, Ora Fairchild MD, Maria C Layton MD,  Jad Riley MD, Jessica Wheat MD, Paresh Stewart MD, Luis Weaver MD, Ramesh Bloom MD, Tommy Epps MD, Jose De Luna DO, Christopher Bocanegra MD, Neil Pedro MD, Mohsin Reza, MD, Shirley Waterhouse, CNP,  Lynette Evans CNP, Jose Berger, CNP,  Renetta Parisi, LEONARDO, Talia Baer, CNP, Stephanie Briggs, CNP, Kelli Angel, CNP, Saundra Franklin CNP, CINTHIA Bustillos-DIANNA, Betina Garcia, CNP, Cece Crawford, CNP,  Inessa Barr, CNP, Estrellita Murcia, CNP, Diane Hernandez, CNP,  Anastasiya Castillo, CNS, Yoli Ivy CNP, Ritu Sarabia CNP,   Bina Monge, CNP         [facility]   IN-PATIENT SERVICE   [facility]    Progress Note    2/17/2025    1:54 PM    Name:   Kiel Day  MRN:     [id number]     Acct:      [id number]   Room:   [facility]/326-01   Day:  3  Admit Date:  2/12/2025  2:01 AM    PCP:   Angelita Walters MD  Code Status:  Full Code    Subjective:     Patient seen in follow-up for syncope, shortness of breath/cough, influenza A.  Patient states \"I am still coughing up a lot of mucus, I feel better though\"    Patient is feeling better overall.  His VQ scan came back positive for pulmonary embolism however his lower extremity Dopplers are negative.  I have significant concern that his VQ scan is abnormal secondary to his acute infection and not because of a pulmonary embolism.  I am going to check a BMP and providing his renal function is stable I do feel that the patient requires a CTA for definitive diagnosis as to whether or not he has 
[facility]  Office: [phone]  Lemuel Miramontes DO, Galo Perez DO, Jensen Jacobo DO, Franky Snow DO, Caleb Rodriguez MD, Fatmata Purdy MD, Chyna Arauz MD, Sanam Gonzalez MD,  Speedy Matthew MD, Tl Santos MD, Bonnie Alexander MD,  Neil Trimble DO, Maryam English MD, Anurag Cary MD, Vipul Miramontes DO, Coby Rizzo MD,  Herman Junior DO, Yasmeen Paul MD, Verito Quezada MD, Ora Fairchild MD, Maria C Layton MD,  Jad Riley MD, Jessica Wheat MD, Paresh Stewart MD, Luis Weaver MD, Ramesh Bloom MD, Tommy Epps MD, Jose De Luna DO, Christopher Bocanegra MD, Neil Pedro MD, Mohsin Reza, MD, Shirley Waterhouse, CNP,  Lynette Evans CNP, Jose Berger, CNP,  Renetta Parisi, LEONARDO, Talia Baer, CNP, Stephanie Briggs, CNP, Kelli Angel, CNP, Saundra Franklin CNP, CINTHIA Bustillos-DIANNA, Betina Garcia, CNP, Cece Crawford, CNP,  Inessa Barr, CNP, Estrellita Murcia, CNP, Diane Hernandez, CNP,  Anastasiya Castillo, CNS, Yoli Ivy CNP, Ritu Sarabia CNP,   Bina Monge, CNP         [facility]   IN-PATIENT SERVICE   [facility]    Progress Note    2/13/2025    11:16 AM    Name:   Kiel Day  MRN:     [id number]     Acct:      [id number]   Room:   303/303-01   Day:  0  Admit Date:  2/12/2025  2:01 AM    PCP:   Angelita Walters MD  Code Status:  Full Code    Subjective:     Patient seen in follow-up for syncope, chest pain.  Patient states \"I do not feel well, and coughing\"    Patient has developed some shortness of breath and cough overnight.  Clinically it appears that he may have a viral illness as he did have a prodrome prior to admission.  I will check a respiratory panel in addition to a procalcitonin.  Case has been discussed with cardiology.  Cardiac workup will be postponed until respiratory status improved.  Patient has required initiation of supplemental oxygen secondary to his hypoxia.  Given the fact that he does not have a significant 
[facility]  Office: [phone]  Lemuel Miramontes DO, Galo Perez DO, Jensen Jacobo DO, Franky Snow DO, Caleb Rodriguez MD, Fatmata Purdy MD, Chyna Arauz MD, Sanam Gonzalez MD,  Speedy Matthew MD, Tl Santos MD, Bonnie Alexander MD,  Neil Trimble DO, Maryam English MD, Anurag Cary MD, Vipul Miramontes DO, Coby Rizzo MD,  Herman Junior DO, Yasmeen Paul MD, Verito Quezada MD, Ora Fairchild MD, Maria C Layton MD,  Jad Riley MD, Jessica Wheat MD, Paresh Stewart MD, Luis Weaver MD, Ramesh Bloom MD, Tommy Epps MD, Jose De Luna DO, Christopher Bocanegra MD, Neil Pedro MD, Mohsin Reza, MD, Shirley Waterhouse, CNP,  Lynette Evans CNP, Jose Berger, CNP,  Renetta Parisi, LEONARDO, Talia Baer, CNP, Stephanie Briggs, CNP, Kelli Angel, CNP, Saundra Franklin CNP, CINTHIA Bustillos-DIANNA, Betina Garcia, CNP, Cece [place], CNP,  Inessa Barr, CNP, Estrellita Murcia, CNP, Diane Hernandez, CNP,  Anastasiya Castillo, CNS, Yoli Ivy CNP, Ritu Sarabia [facility],   Bina Monge, CNP         [facility]   IN-PATIENT SERVICE   [facility]    Progress Note    2/14/2025    10:44 AM    Name:   Kiel Day  MRN:     [id number]     Acct:      [id number]   Room:   303/303-01   Day:  0  Admit Date:  2/12/2025  2:01 AM    PCP:   Angelita Walters MD  Code Status:  Full Code    Subjective:     Patient seen in follow up for syncope, cough, fever. Patient states, \"I am still coughing and short of breath\"    Patient has tested positive for Influenza A. His symptoms started a few days ago and I explained to him that we will try Tamiflu, but he may not have any significant improvement from this medications as he may be outside the window of treatment. CV input noted, ischemia workup to be completed at a later time once influenza symptoms improved. Echo shows preserved function. He may be able to complete further CV workup as an outpatient depending on his clinical course. He denies 
Home Oxygen Evaluation    Home Oxygen Evaluation completed.    Patient is on 2 liters per minute via cannula.  Resting SpO2 = 94%  Resting SpO2 on room air = 92%    SpO2 on room air with exercise = 86%  SpO2 on oxygen as above with exercise = 93%        Sandra Talley RCP  11:35 AM  
Notified by Renetta MOHAMUD with pulmonary that they would like a STAT VQ scan for patient. Nuc Med not on/or on call for this hospital during weekend hours. [facility] called and I spoke with their nuc med who stated they were leaving at noon. We discussed all options for patient to get testing. She stated the only other option would be to transfer patient to [facility] as an inpatient over at their facility and then tomorrow they would have someone on call for nuc med that they would be able to call in for patient, otherwise it is not an option for patient to utilize on call nuc med, while staying inpatient at [facility]. Renetta MOHAMUD notified of options. She will talk to  and call back to discuss plan.   
Patient was evaluated today for the diagnosis of COPD.  I entered a DME order for home oxygen at 2 lpm because the diagnosis and testing require the patient to have supplemental oxygen.  Condition will improve or be benefited by oxygen use.  The patient is not able to perform good mobility in a home setting and therefore does require the use of a portable oxygen system.  The need for this equipment was discussed with the patient and he understands and is in agreement.   
[facility]  Office: [phone]  Lemuel Miramontes DO, Galo Perez DO, Jensen Jacobo DO, Franky Snow DO, Caleb Rodriguez MD, Fatmata Purdy MD, Chyna Arauz MD, Sanam Gonzalez MD,  Speedy Matthew MD, Tl Santos MD, Bonnie Alexander MD,  Neil Trimble DO, Maryam English MD, Anurag Cary MD, Vipul Miramontes DO, Coby Rizzo MD,  Herman Junior DO, Yasmeen Paul MD, Verito Quezada MD, Ora Fairchild MD, Maria C Layton MD,  Jad Riley MD, Jessica Wheat MD, Paresh Stewart MD, Luis Weaver MD, Ramesh Bloom MD, Tommy Epps MD, Jose De Luna DO, Christopher Bocanegra MD, Neil Pedro MD, Mohsin Reza, MD, Shirley Waterhouse, CNP,  Lynette Evans CNP, Jose Berger, CNP,  Renetta Parisi, LEONARDO, Talia Baer, CNP, Stephanie Briggs, CNP, Kelli Angel, CNP, Saundra Franklin CNP, Chantell Milligan PA-C, Betina Garcia, CNP, Cece Crawford, CNP,  Inessa Barr, CNP, Estrellita Murcia, CNP, Diane Hernandez, CNP,  Anastasiya Castillo, CNS, Yoli Ivy CNP, Ritu Sarabia CNP,   Bian Monge, CNP         [facility]   IN-PATIENT SERVICE   [facility]    Progress Note    2/16/2025    12:49 PM    Name:   Kiel Day  MRN:     [id number]     Acct:      [id number]   Room:   [facility] Day:  2  Admit Date:  2/12/2025  2:01 AM    PCP:   Angelita Walters MD  Code Status:  Full Code    Subjective:     Patient seen in follow-up for syncope, influenza A.  Patient states \"I am feeling better\"    Patient has shown significant improvement over the last 24 hours.  Oxygen needs have decreased from 10 L/min to 4 L/min.  VQ scan/lower extremity Dopplers are pending.  His elevated D-dimer is likely secondary to his acute infection however he does have family medical history positive for DVT/PE.  Overall he is feeling better and denies any questions or concerns at this point in time.    Medications:     Allergies:    Allergies   Allergen Reactions    Penicillins        Current Meds:   Scheduled 
Pulmonary Progress Note  [facility]      Patient - Kiel Day,  Age - 59 y.o.    - 1965      Room Number - 326/326-01   MRN -  [id number]   [facility] # - [id number]  Date of Admission -  2025  2:01 AM    Consulting Service/Physician   Consulting - Vipul Miramontes DO  Primary Care Physician - Angelita Walters MD     SUBJECTIVE   Patient was seen at bedside this afternoon and appears to be doing well. Patient on 2L saturating 92%. Complains of shortness of breath. Denies any chest pain.   He will going for CTA of chest if BMP results with improved kidney function.     OBJECTIVE   VITALS    height is 1.803 m (5' 11\") and weight is 98.4 kg (216 lb 14.9 oz). His temporal temperature is 98.6 °F (37 °C). His blood pressure is 166/84 (abnormal) and his pulse is 64. His respiration is 15 and oxygen saturation is 92%.     Body mass index is 30.26 kg/m².  Temperature Range: Temp: 98.6 °F (37 °C) Temp  Av.9 °F (36.6 °C)  Min: 97.5 °F (36.4 °C)  Max: 98.6 °F (37 °C)  BP Range:  Systolic (24hrs), Av , Min:155 , Max:183     Diastolic (24hrs), Av, Min:70, Max:87    Pulse Range: Pulse  Av.1  Min: 63  Max: 89  Respiration Range: Resp  Av.1  Min: 15  Max: 22  Current Pulse Ox::  SpO2: 92 %  24HR Pulse Ox Range:  SpO2  Av.3 %  Min: 90 %  Max: 95 %  Oxygen Amount and Delivery: O2 Flow Rate (L/min): 2 L/min    Wt Readings from Last 3 Encounters:   25 98.4 kg (216 lb 14.9 oz)   24 94.3 kg (208 lb)   23 103.9 kg (229 lb)       I/O (24 Hours)    Intake/Output Summary (Last 24 hours) at 2025 1313  Last data filed at 2025 1033  Gross per 24 hour   Intake 250 ml   Output --   Net 250 ml       EXAM     General Appearance  Awake, alert, oriented, in no acute distress  HEENT - normocephalic, atraumatic.  Neck - Supple,  trachea midline   Lungs -coarse breath sounds posteriorly  Heart Exam:PMI normal. No lifts, heaves, or thrills. RRR. No 
Pulmonary Progress Note  [facility]      Patient - Kiel Day,  Age - 59 y.o.    - 1965      Room Number - 326/326-01   N -  [id number]   [facility] # - [id number]  Date of Admission -  2025  2:01 AM    Consulting Service/Physician   Consulting - Vipul Miramontes DO  Primary Care Physician - Angelita Walters MD     SUBJECTIVE   Patient appears to be in decent spirits.  Significant other at bedside  Patient's O2 requirements were up to 10 L now down to 4 L with O2 saturation is 93%      OBJECTIVE   VITALS    height is 1.803 m (5' 11\") and weight is 102.6 kg (226 lb 3.1 oz). His oral temperature is 97.5 °F (36.4 °C). His blood pressure is 178/67 (abnormal) and his pulse is 79. His respiration is 18 and oxygen saturation is 95%.     Body mass index is 31.55 kg/m².  Temperature Range: Temp: 97.5 °F (36.4 °C) Temp  Av.6 °F (36.4 °C)  Min: 97.4 °F (36.3 °C)  Max: 97.9 °F (36.6 °C)  BP Range:  Systolic (24hrs), Av , Min:132 , Max:183     Diastolic (24hrs), Av, Min:67, Max:108    Pulse Range: Pulse  Av.1  Min: 64  Max: 79  Respiration Range: Resp  Av.3  Min: 16  Max: 21  Current Pulse Ox::  SpO2: 95 %  24HR Pulse Ox Range:  SpO2  Av.5 %  Min: 92 %  Max: 95 %  Oxygen Amount and Delivery: O2 Flow Rate (L/min): (S) 4 L/min    Wt Readings from Last 3 Encounters:   25 102.6 kg (226 lb 3.1 oz)   24 94.3 kg (208 lb)   23 103.9 kg (229 lb)       I/O (24 Hours)    Intake/Output Summary (Last 24 hours) at 2025 1149  Last data filed at 2025 0927  Gross per 24 hour   Intake 360 ml   Output --   Net 360 ml       EXAM     General Appearance  Awake, alert, oriented, in no acute distress  HEENT - normocephalic, atraumatic.  Neck - Supple,  trachea midline   Lungs -coarse breath sounds posteriorly  Heart Exam:PMI normal. No lifts, heaves, or thrills. RRR. No murmurs, clicks, gallops, or rubs  Abdomen Exam: Abdomen soft, non-tender. BS 
Pulmonary Progress Note  [facility]      Patient - Kiel Day,  Age - 59 y.o.    - 1965      Room Number - 326/326-01   N -  [id number]   [facility] # - [id number]  Date of Admission -  2025  2:01 AM    Consulting Service/Physician   Consulting - Anurag Cary MD  Primary Care Physician - Angelita Walters MD     SUBJECTIVE   Patient was seen at bedside this morning and appears to be doing well. Patient on 2L saturating 94%.  Shortness of breath with exertion saturating greater than 86% while walking.  Denies shortness of breath or chest pain at rest.  OBJECTIVE   VITALS    height is 1.803 m (5' 11\") and weight is 97.9 kg (215 lb 13.3 oz). His oral temperature is 97.9 °F (36.6 °C). His blood pressure is 189/100 (abnormal) and his pulse is 82. His respiration is 17 and oxygen saturation is 94%.     Body mass index is 30.1 kg/m².  Temperature Range: Temp: 97.9 °F (36.6 °C) Temp  Av.3 °F (36.8 °C)  Min: 97.9 °F (36.6 °C)  Max: 98.6 °F (37 °C)  BP Range:  Systolic (24hrs), Av , Min:166 , Max:190     Diastolic (24hrs), Av, Min:69, Max:100    Pulse Range: Pulse  Av.1  Min: 59  Max: 90  Respiration Range: Resp  Av.3  Min: 15  Max: 20  Current Pulse Ox::  SpO2: 94 %  24HR Pulse Ox Range:  SpO2  Av.1 %  Min: 91 %  Max: 97 %  Oxygen Amount and Delivery: O2 Flow Rate (L/min): 2 L/min    Wt Readings from Last 3 Encounters:   25 97.9 kg (215 lb 13.3 oz)   24 94.3 kg (208 lb)   23 103.9 kg (229 lb)       I/O (24 Hours)    Intake/Output Summary (Last 24 hours) at 2025 1103  Last data filed at 2025 0907  Gross per 24 hour   Intake 10 ml   Output 1590 ml   Net -1580 ml       EXAM     General Appearance  Awake, alert, oriented, in no acute distress  HEENT - normocephalic, atraumatic.  Neck - Supple,  trachea midline   Lungs -coarse breath sounds posteriorly  Heart Exam:PMI normal. No lifts, heaves, or thrills. RRR. No murmurs, clicks, 
Spiritual Health History and Assessment/Progress Note  [facility]    Spiritual/Emotional Needs,  ,  ,      Name: Kiel Day MRN: [id number]    Age: 59 y.o.     Sex: male   Language: English   Yazidi: None   Chest pain of uncertain etiology     Date: 2/18/2025            Total Time Calculated: (P) 7 min              Spiritual Assessment began in [facility]        Referral/Consult From: Rounding   Encounter Overview/Reason: Spiritual/Emotional Needs  Service Provided For: Patient    Writer met w/ pt in pt's room. Pt shared about current health challenges as well as his desire to return to home and work as soon as possible. Pt shared about his significant other who is supportive of pt. Pt kindly declined writer praying for him at this time, but said that he would appreciate writer's prayers. Writer provided ministry of presence, empathetic listening, and offered words of encouragement.     Linda, Belief, Meaning:   Patient identifies as spiritual  Family/Friends No family/friends present      Importance and Influence:  Patient has spiritual/personal beliefs that influence decisions regarding their health  Family/Friends No family/friends present    Community:  Patient feels well-supported. Support system includes: Other: Significant other  Family/Friends No family/friends present    Assessment and Plan of Care:     Patient Interventions include: Facilitated expression of thoughts and feelings, Explored spiritual coping/struggle/distress, and Affirmed coping skills/support systems  Family/Friends Interventions include: No family/friends present    Patient Plan of Care: Spiritual Care available upon further referral  Family/Friends Plan of Care: No family/friends present    Electronically signed by Chaplain Cynthia on 2/18/2025 at 10:52 AM    
[facility]  Office: [phone]  Lemuel Miramontes DO, Glao Perez DO, Jensen Jacobo DO, Franky Snow DO, Caleb Rodriguez MD, Fatmata Purdy MD, Chyna Arauz MD, Sanam Gonzalez MD,  Speedy Matthew MD, Tl Santos MD, Bonnie Alexander MD,  Neil Trimble DO, Maryam English MD, Anurag Cary MD, Vipul Miramontes DO, Coby Rizzo MD,  Herman Junior DO, Yasmeen Paul MD, Verito Quezada MD, Ora Fairchild MD, Maria C Layton MD,  Jad Riley MD, Jessica Wheat MD, Paresh Stewart MD, Luis Weaver MD, Ramesh Bloom MD, Tommy Epps MD, Jose De Luna DO, Christopher Bocanegra MD, Neil Pedro MD, Mohsin Reza, MD, Shirley Waterhouse, CNP,  Lynette Evans CNP, Jose Berger, CNP,  Renetta Parisi, LEONARDO, Talia Baer, CNP, Stephanie Briggs, CNP, Kelli Angel, CNP, Saundra Farnklin CNP, Chantell Milligan PA-DIANNA, Betina Garcia, CNP, Cece Crawford, CNP,  Inessa Barr, CNP, Estrellita Murcia, CNP, Diane Hernandez, CNP,  Anastasiya Castillo, CNS, Yoli Ivy CNP, Ritu Sarabia CNP,   Bina Monge, CNP         [facility]   IN-PATIENT SERVICE   [facility]    Progress Note    2/15/2025    11:49 AM    Name:   Kiel Day  MRN:     [id number]     Acct:      [id number]   Room:   326/326-01   Day:  1  Admit Date:  2/12/2025  2:01 AM    PCP:   Angelita Walters MD  Code Status:  Full Code    Subjective:     Patient seen in follow-up for acute hypoxic respiratory failure, syncope, cough.  Patient states \"I feel better\"    Pulmonology input noted and appreciated.  Patient's renal function is such that if PE is to be ruled out would avoid CTA.  We can obtain a VQ scan along with lower extremity Dopplers.  The patient's Cozaar/lisinopril continues to be on hold and he is on gentle fluids.  Patient himself feels better overall.  He denies any chest pain.  His breathing is improving.  He still has a cough which is nonproductive in nature.  He continues to require significant mount of supplemental 
72 hours.    Invalid input(s): \"LDLCALCU\"    INR:   Recent Labs     02/15/25  1402   INR 1.0         Objective:     Vitals: BP (!) 189/100   Pulse 82   Temp 97.9 °F (36.6 °C) (Oral)   Resp 17   Ht 1.803 m (5' 11\")   Wt 97.9 kg (215 lb 13.3 oz)   SpO2 94%   BMI 30.10 kg/m²     General appearance: awake, alert, in no apparent respiratory distress on 2L NC o2.   HEENT: Head: Normocephalic, no lesions, without obvious abnormality  Neck: no JVD  Lungs: expiratory wheezes bilaterally, exp rhonchi, no rales.   Heart: regular rate and rhythm, S1, S2 normal, no murmur, click, rub or gallop  Abdomen: soft, non-tender; bowel sounds normal  Extremities: No LE edema  Neurologic: Mental status: Alert, oriented. Motor and sensory not done.       Cardiac testing:   EKG:  Normal sinus rhythm, right bundle branch block     Echocardiogram 2/12/25:    Left Ventricle: Low normal left ventricular systolic function. EF by 2D Simpsons Biplane is 50%. Left ventricle size is normal. Moderately increased wall thickness. Normal wall motion. Abnormal diastolic function.    Aortic Valve: Trileaflet valve. Mildly thickened cusps.    Mitral Valve: Mildly thickened leaflets. Mild regurgitation.    Tricuspid Valve: Mild regurgitation.    Left Atrium: Left atrium is mildly dilated. Left atrial volume index is mildly increased (35-41 mL/m2). LA Vol Index is  37 ml/m2.    IVC/SVC: IVC diameter is greater than 21 mm and decreases greater than 50% during inspiration; therefore the estimated right atrial pressure is intermediate (~8 mmHg).    Image quality is adequate.        Impression:   Syncope, vasovagal from history likely triggered by dehydration, emesis and recent viral prodrome  Acute hypoxic resp failure  Influenza A   NSTEMI, mildly elevated troponins with a flat trend, no active angina, likely type ii, underlying cad not excluded given risk factors   Acute kidney injury  Hypertension, uncontrolled  Hyperlipidemia  Type 2 diabetes

## 2025-02-18 NOTE — PLAN OF CARE
Problem: Chronic Conditions and Co-morbidities  Goal: Patient's chronic conditions and co-morbidity symptoms are monitored and maintained or improved  2/16/2025 1543 by Lilian Barr RN  Outcome: Progressing  Flowsheets (Taken 2/16/2025 0815)  Care Plan - Patient's Chronic Conditions and Co-Morbidity Symptoms are Monitored and Maintained or Improved: Monitor and assess patient's chronic conditions and comorbid symptoms for stability, deterioration, or improvement  2/16/2025 0235 by Lilian Castro RN  Outcome: Progressing  Flowsheets (Taken 2/15/2025 2013)  Care Plan - Patient's Chronic Conditions and Co-Morbidity Symptoms are Monitored and Maintained or Improved:   Collaborate with multidisciplinary team to address chronic and comorbid conditions and prevent exacerbation or deterioration   Monitor and assess patient's chronic conditions and comorbid symptoms for stability, deterioration, or improvement   Update acute care plan with appropriate goals if chronic or comorbid symptoms are exacerbated and prevent overall improvement and discharge     Problem: Safety - Adult  Goal: Free from fall injury  2/16/2025 1543 by Lilian Barr RN  Outcome: Progressing  2/16/2025 0235 by Lilian Castro RN  Outcome: Progressing     Problem: ABCDS Injury Assessment  Goal: Absence of physical injury  2/16/2025 1543 by Lilian Barr RN  Outcome: Progressing  2/16/2025 0235 by Lilian Castro RN  Outcome: Progressing     Problem: Respiratory - Adult  Goal: Achieves optimal ventilation and oxygenation  2/16/2025 1543 by Lilian Barr RN  Outcome: Progressing  2/16/2025 0235 by Lilian Castro RN  Outcome: Progressing  Flowsheets (Taken 2/15/2025 2013)  Achieves optimal ventilation and oxygenation:   Assess for changes in respiratory status   Assess for changes in mentation and behavior   Position to facilitate oxygenation and minimize respiratory effort   Oxygen supplementation based on oxygen saturation or arterial blood 
  Problem: Chronic Conditions and Co-morbidities  Goal: Patient's chronic conditions and co-morbidity symptoms are monitored and maintained or improved  2/17/2025 0311 by Mao Hernandez RN  Outcome: Progressing  Flowsheets (Taken 2/16/2025 2058)  Care Plan - Patient's Chronic Conditions and Co-Morbidity Symptoms are Monitored and Maintained or Improved: Monitor and assess patient's chronic conditions and comorbid symptoms for stability, deterioration, or improvement  2/16/2025 1543 by Lilian Barr RN  Outcome: Progressing  Flowsheets (Taken 2/16/2025 0815)  Care Plan - Patient's Chronic Conditions and Co-Morbidity Symptoms are Monitored and Maintained or Improved: Monitor and assess patient's chronic conditions and comorbid symptoms for stability, deterioration, or improvement     Problem: Safety - Adult  Goal: Free from fall injury  2/17/2025 0311 by Mao Hernandez RN  Outcome: Progressing  2/16/2025 1543 by Lilian Barr RN  Outcome: Progressing     Problem: ABCDS Injury Assessment  Goal: Absence of physical injury  2/17/2025 0311 by Mao Hernandez RN  Outcome: Progressing  2/16/2025 1543 by Lilian Barr RN  Outcome: Progressing     Problem: Respiratory - Adult  Goal: Achieves optimal ventilation and oxygenation  2/17/2025 0311 by Mao Hernandez RN  Outcome: Progressing  Flowsheets (Taken 2/16/2025 2058)  Achieves optimal ventilation and oxygenation: Assess for changes in respiratory status  2/16/2025 1543 by Lilian Barr RN  Outcome: Progressing     Problem: Discharge Planning  Goal: Discharge to home or other facility with appropriate resources  2/17/2025 0311 by Mao Hernandez RN  Outcome: Progressing  Flowsheets (Taken 2/16/2025 2058)  Discharge to home or other facility with appropriate resources: Identify barriers to discharge with patient and caregiver  2/16/2025 1543 by Lilian Barr RN  Outcome: Progressing  Flowsheets (Taken 2/16/2025 0815)  Discharge to home or other facility with 
  Problem: Chronic Conditions and Co-morbidities  Goal: Patient's chronic conditions and co-morbidity symptoms are monitored and maintained or improved  2/17/2025 1627 by Dayan Winkler RN  Outcome: Progressing  Flowsheets (Taken 2/17/2025 0726)  Care Plan - Patient's Chronic Conditions and Co-Morbidity Symptoms are Monitored and Maintained or Improved:   Monitor and assess patient's chronic conditions and comorbid symptoms for stability, deterioration, or improvement   Collaborate with multidisciplinary team to address chronic and comorbid conditions and prevent exacerbation or deterioration   Update acute care plan with appropriate goals if chronic or comorbid symptoms are exacerbated and prevent overall improvement and discharge  2/17/2025 0311 by Mao Hernandez RN  Outcome: Progressing  Flowsheets (Taken 2/16/2025 2058)  Care Plan - Patient's Chronic Conditions and Co-Morbidity Symptoms are Monitored and Maintained or Improved: Monitor and assess patient's chronic conditions and comorbid symptoms for stability, deterioration, or improvement     Problem: Safety - Adult  Goal: Free from fall injury  2/17/2025 1627 by Dayan Winkler RN  Outcome: Progressing  2/17/2025 0311 by Mao Hernandez RN  Outcome: Progressing     Problem: ABCDS Injury Assessment  Goal: Absence of physical injury  2/17/2025 1627 by Dayan Winkler RN  Outcome: Progressing  2/17/2025 0311 by Mao Hernandez RN  Outcome: Progressing     Problem: Respiratory - Adult  Goal: Achieves optimal ventilation and oxygenation  2/17/2025 1627 by Dayan Winkler RN  Outcome: Progressing  Flowsheets (Taken 2/17/2025 0726)  Achieves optimal ventilation and oxygenation:   Assess for changes in respiratory status   Assess for changes in mentation and behavior   Position to facilitate oxygenation and minimize respiratory effort   Oxygen supplementation based on oxygen saturation or arterial blood gases   Initiate smoking cessation protocol as 
  Problem: Chronic Conditions and Co-morbidities  Goal: Patient's chronic conditions and co-morbidity symptoms are monitored and maintained or improved  2/18/2025 0446 by Mao Hernandez RN  Outcome: Progressing  Flowsheets (Taken 2/17/2025 2003)  Care Plan - Patient's Chronic Conditions and Co-Morbidity Symptoms are Monitored and Maintained or Improved: Monitor and assess patient's chronic conditions and comorbid symptoms for stability, deterioration, or improvement  2/17/2025 1627 by Dayan Winkler RN  Outcome: Progressing  Flowsheets (Taken 2/17/2025 0726)  Care Plan - Patient's Chronic Conditions and Co-Morbidity Symptoms are Monitored and Maintained or Improved:   Monitor and assess patient's chronic conditions and comorbid symptoms for stability, deterioration, or improvement   Collaborate with multidisciplinary team to address chronic and comorbid conditions and prevent exacerbation or deterioration   Update acute care plan with appropriate goals if chronic or comorbid symptoms are exacerbated and prevent overall improvement and discharge     Problem: Safety - Adult  Goal: Free from fall injury  2/18/2025 0446 by Mao Hernandez RN  Outcome: Progressing  2/17/2025 1627 by Dayan Winkler RN  Outcome: Progressing     Problem: ABCDS Injury Assessment  Goal: Absence of physical injury  2/18/2025 0446 by Mao Hernandez RN  Outcome: Progressing  2/17/2025 1627 by Dayan Winkler RN  Outcome: Progressing     Problem: Respiratory - Adult  Goal: Achieves optimal ventilation and oxygenation  2/18/2025 0446 by Mao Hernandez RN  Outcome: Progressing  2/17/2025 2003 by Salma Huang RCP  Outcome: Progressing  Flowsheets (Taken 2/17/2025 2003)  Achieves optimal ventilation and oxygenation:   Assess for changes in respiratory status   Position to facilitate oxygenation and minimize respiratory effort   Respiratory therapy support as indicated   Oxygen supplementation based on oxygen saturation or 
  Problem: Chronic Conditions and Co-morbidities  Goal: Patient's chronic conditions and co-morbidity symptoms are monitored and maintained or improved  Outcome: Progressing     Problem: Safety - Adult  Goal: Free from fall injury  Outcome: Progressing     Problem: ABCDS Injury Assessment  Goal: Absence of physical injury  Outcome: Progressing     Problem: Respiratory - Adult  Goal: Achieves optimal ventilation and oxygenation  2/12/2025 1854 by Maggy Henry LPN  Outcome: Progressing  2/12/2025 1011 by Elodia Louie RCP  Outcome: Progressing  Flowsheets (Taken 2/12/2025 1011)  Achieves optimal ventilation and oxygenation:   Assess for changes in respiratory status   Assess for changes in mentation and behavior   Position to facilitate oxygenation and minimize respiratory effort   Oxygen supplementation based on oxygen saturation or arterial blood gases   Encourage broncho-pulmonary hygiene including cough, deep breathe, incentive spirometry   Assess the need for suctioning and aspirate as needed   Assess and instruct to report shortness of breath or any respiratory difficulty   Respiratory therapy support as indicated     Problem: Discharge Planning  Goal: Discharge to home or other facility with appropriate resources  Outcome: Progressing     
  Problem: Chronic Conditions and Co-morbidities  Goal: Patient's chronic conditions and co-morbidity symptoms are monitored and maintained or improved  Outcome: Progressing  Flowsheets  Taken 2/13/2025 1000 by Maribell Hernandez RN  Care Plan - Patient's Chronic Conditions and Co-Morbidity Symptoms are Monitored and Maintained or Improved:   Collaborate with multidisciplinary team to address chronic and comorbid conditions and prevent exacerbation or deterioration   Monitor and assess patient's chronic conditions and comorbid symptoms for stability, deterioration, or improvement  Taken 2/13/2025 0920 by Maggy Henry LPN  Care Plan - Patient's Chronic Conditions and Co-Morbidity Symptoms are Monitored and Maintained or Improved: Monitor and assess patient's chronic conditions and comorbid symptoms for stability, deterioration, or improvement     Problem: Safety - Adult  Goal: Free from fall injury  Outcome: Progressing     Problem: ABCDS Injury Assessment  Goal: Absence of physical injury  Outcome: Progressing     Problem: Respiratory - Adult  Goal: Achieves optimal ventilation and oxygenation  2/13/2025 1949 by Maggy Henry LPN  Outcome: Progressing  2/13/2025 1842 by Sandra Talley RCP  Flowsheets  Taken 2/13/2025 1842 by Sandra Talley RCP  Achieves optimal ventilation and oxygenation:   Assess for changes in respiratory status   Respiratory therapy support as indicated  Taken 2/13/2025 1000 by Maribell Hernandez RN  Achieves optimal ventilation and oxygenation:   Assess for changes in respiratory status   Assess for changes in mentation and behavior   Position to facilitate oxygenation and minimize respiratory effort  Taken 2/13/2025 0920 by Maggy Henry LPN  Achieves optimal ventilation and oxygenation:   Assess for changes in respiratory status   Assess for changes in mentation and behavior     Problem: Discharge Planning  Goal: Discharge to home or other facility with appropriate 
  Problem: Chronic Conditions and Co-morbidities  Goal: Patient's chronic conditions and co-morbidity symptoms are monitored and maintained or improved  Outcome: Progressing  Flowsheets (Taken 2/14/2025 0815 by Giovani Wheatley, RN)  Care Plan - Patient's Chronic Conditions and Co-Morbidity Symptoms are Monitored and Maintained or Improved: Monitor and assess patient's chronic conditions and comorbid symptoms for stability, deterioration, or improvement     Problem: Safety - Adult  Goal: Free from fall injury  Outcome: Progressing     Problem: ABCDS Injury Assessment  Goal: Absence of physical injury  Outcome: Progressing     Problem: Respiratory - Adult  Goal: Achieves optimal ventilation and oxygenation  Outcome: Progressing  Flowsheets  Taken 2/14/2025 0918 by Azalea Musa RCP  Achieves optimal ventilation and oxygenation:   Assess for changes in respiratory status   Oxygen supplementation based on oxygen saturation or arterial blood gases   Assess and instruct to report shortness of breath or any respiratory difficulty   Respiratory therapy support as indicated   Encourage broncho-pulmonary hygiene including cough, deep breathe, incentive spirometry  Taken 2/14/2025 0815 by Giovani Wheatley RN  Achieves optimal ventilation and oxygenation: Assess for changes in respiratory status     Problem: Discharge Planning  Goal: Discharge to home or other facility with appropriate resources  Outcome: Progressing  Flowsheets (Taken 2/14/2025 0815 by Giovani Wheatley, RN)  Discharge to home or other facility with appropriate resources: Identify barriers to discharge with patient and caregiver     Problem: Skin/Tissue Integrity  Goal: Skin integrity remains intact  Description: 1.  Monitor for areas of redness and/or skin breakdown  2.  Assess vascular access sites hourly  3.  Every 4-6 hours minimum:  Change oxygen saturation probe site  4.  Every 4-6 hours:  If on nasal continuous positive airway pressure, respiratory 
  Problem: Chronic Conditions and Co-morbidities  Goal: Patient's chronic conditions and co-morbidity symptoms are monitored and maintained or improved  Outcome: Progressing  Flowsheets (Taken 2/15/2025 2013)  Care Plan - Patient's Chronic Conditions and Co-Morbidity Symptoms are Monitored and Maintained or Improved:   Collaborate with multidisciplinary team to address chronic and comorbid conditions and prevent exacerbation or deterioration   Monitor and assess patient's chronic conditions and comorbid symptoms for stability, deterioration, or improvement   Update acute care plan with appropriate goals if chronic or comorbid symptoms are exacerbated and prevent overall improvement and discharge     Problem: Safety - Adult  Goal: Free from fall injury  Outcome: Progressing     Problem: ABCDS Injury Assessment  Goal: Absence of physical injury  Outcome: Progressing     Problem: Respiratory - Adult  Goal: Achieves optimal ventilation and oxygenation  Outcome: Progressing  Flowsheets (Taken 2/15/2025 2013)  Achieves optimal ventilation and oxygenation:   Assess for changes in respiratory status   Assess for changes in mentation and behavior   Position to facilitate oxygenation and minimize respiratory effort   Oxygen supplementation based on oxygen saturation or arterial blood gases   Initiate smoking cessation protocol as indicated   Encourage broncho-pulmonary hygiene including cough, deep breathe, incentive spirometry   Assess the need for suctioning and aspirate as needed   Assess and instruct to report shortness of breath or any respiratory difficulty   Respiratory therapy support as indicated     Problem: Discharge Planning  Goal: Discharge to home or other facility with appropriate resources  Outcome: Progressing  Flowsheets (Taken 2/15/2025 2013)  Discharge to home or other facility with appropriate resources:   Identify barriers to discharge with patient and caregiver   Arrange for needed discharge resources 
  Problem: Respiratory - Adult  Goal: Achieves optimal ventilation and oxygenation  2/13/2025 2143 by Marii Sarkar, JOEL  Outcome: Progressing  Flowsheets (Taken 2/13/2025 2143)  Achieves optimal ventilation and oxygenation:   Assess for changes in respiratory status   Respiratory therapy support as indicated   Oxygen supplementation based on oxygen saturation or arterial blood gases   Encourage broncho-pulmonary hygiene including cough, deep breathe, incentive spirometry   Assess and instruct to report shortness of breath or any respiratory difficulty     
  Problem: Respiratory - Adult  Goal: Achieves optimal ventilation and oxygenation  2/15/2025 0001 by Cassi Merrill RCP  Flowsheets (Taken 2/15/2025 0001)  Achieves optimal ventilation and oxygenation:   Assess for changes in respiratory status   Respiratory therapy support as indicated   Assess and instruct to report shortness of breath or any respiratory difficulty     
  Problem: Respiratory - Adult  Goal: Achieves optimal ventilation and oxygenation  2/15/2025 0905 by Rhonda Chester RCP  Outcome: Progressing     
  Problem: Respiratory - Adult  Goal: Achieves optimal ventilation and oxygenation  2/17/2025 1647 by Sandra Talley RCP  Flowsheets (Taken 2/17/2025 1647)  Achieves optimal ventilation and oxygenation:   Assess for changes in respiratory status   Assess and instruct to report shortness of breath or any respiratory difficulty  2/17/2025 1627 by Dayan Winkler, RN  Outcome: Progressing  Flowsheets (Taken 2/17/2025 0726)  Achieves optimal ventilation and oxygenation:   Assess for changes in respiratory status   Assess for changes in mentation and behavior   Position to facilitate oxygenation and minimize respiratory effort   Oxygen supplementation based on oxygen saturation or arterial blood gases   Initiate smoking cessation protocol as indicated   Encourage broncho-pulmonary hygiene including cough, deep breathe, incentive spirometry   Assess the need for suctioning and aspirate as needed   Assess and instruct to report shortness of breath or any respiratory difficulty   Respiratory therapy support as indicated  2/17/2025 0311 by Mao Hernandez RN  Outcome: Progressing  Flowsheets (Taken 2/16/2025 2058)  Achieves optimal ventilation and oxygenation: Assess for changes in respiratory status     Problem: Respiratory - Adult  Goal: Achieves optimal ventilation and oxygenation  2/17/2025 1647 by Sandra Talley RCP  Flowsheets (Taken 2/17/2025 1647)  Achieves optimal ventilation and oxygenation:   Assess for changes in respiratory status   Assess and instruct to report shortness of breath or any respiratory difficulty     
  Problem: Respiratory - Adult  Goal: Achieves optimal ventilation and oxygenation  2/17/2025 2003 by Salma Huang RCP  Outcome: Progressing  Flowsheets (Taken 2/17/2025 2003)  Achieves optimal ventilation and oxygenation:   Assess for changes in respiratory status   Position to facilitate oxygenation and minimize respiratory effort   Respiratory therapy support as indicated   Oxygen supplementation based on oxygen saturation or arterial blood gases   Encourage broncho-pulmonary hygiene including cough, deep breathe, incentive spirometry   Assess and instruct to report shortness of breath or any respiratory difficulty     
  Problem: Respiratory - Adult  Goal: Achieves optimal ventilation and oxygenation  2/18/2025 0803 by Sandra Talley RCP  Flowsheets (Taken 2/18/2025 0803)  Achieves optimal ventilation and oxygenation:   Assess for changes in respiratory status   Assess and instruct to report shortness of breath or any respiratory difficulty  2/18/2025 0802 by Sandra Talley RCP  Flowsheets (Taken 2/18/2025 0802)  Achieves optimal ventilation and oxygenation:   Assess for changes in respiratory status   Assess and instruct to report shortness of breath or any respiratory difficulty  2/18/2025 0446 by Mao Hernandez, RN  Outcome: Progressing  2/17/2025 2003 by Salma Huang RCP  Outcome: Progressing  Flowsheets (Taken 2/17/2025 2003)  Achieves optimal ventilation and oxygenation:   Assess for changes in respiratory status   Position to facilitate oxygenation and minimize respiratory effort   Respiratory therapy support as indicated   Oxygen supplementation based on oxygen saturation or arterial blood gases   Encourage broncho-pulmonary hygiene including cough, deep breathe, incentive spirometry   Assess and instruct to report shortness of breath or any respiratory difficulty     
  Problem: Respiratory - Adult  Goal: Achieves optimal ventilation and oxygenation  Flowsheets  Taken 2/13/2025 1842 by Sandra Talley RCP  Achieves optimal ventilation and oxygenation:   Assess for changes in respiratory status   Respiratory therapy support as indicated  Taken 2/13/2025 1000 by Maribell Hernandez RN  Achieves optimal ventilation and oxygenation:   Assess for changes in respiratory status   Assess for changes in mentation and behavior   Position to facilitate oxygenation and minimize respiratory effort  Taken 2/13/2025 0920 by Maggy Henry LPN  Achieves optimal ventilation and oxygenation:   Assess for changes in respiratory status   Assess for changes in mentation and behavior     
  Problem: Respiratory - Adult  Goal: Achieves optimal ventilation and oxygenation  Outcome: Progressing  Flowsheets (Taken 2/12/2025 1011)  Achieves optimal ventilation and oxygenation:   Assess for changes in respiratory status   Assess for changes in mentation and behavior   Position to facilitate oxygenation and minimize respiratory effort   Oxygen supplementation based on oxygen saturation or arterial blood gases   Encourage broncho-pulmonary hygiene including cough, deep breathe, incentive spirometry   Assess the need for suctioning and aspirate as needed   Assess and instruct to report shortness of breath or any respiratory difficulty   Respiratory therapy support as indicated     
BRONCHOSPASM/BRONCHOCONSTRICTION    IMPROVE  AERATION/BREATHSOUNDS  ADMINISTER BRONCHODILATOR THERAPY AS APPROPRIATE  ASSESS BREATH SOUNDS  PATIENT EDUCATION AS NEEDED    Pt placed on oxygen 2 LPM Spo2 92-93%. (Room air < 88%)        Problem: Respiratory - Adult  Goal: Achieves optimal ventilation and oxygenation  2/12/2025 2204 by Jackeline Macedo RCP  Outcome: Progressing     
4-6 hours minimum: Change oxygen saturation probe site   Every 4-6 hours: If on nasal continuous positive airway pressure, respiratory therapy assesses nares and determine need for appliance change or resting period

## 2025-02-22 ENCOUNTER — HOSPITAL ENCOUNTER (INPATIENT)
Age: 60
LOS: 11 days | Discharge: HOME | DRG: 286 | End: 2025-03-05
Attending: EMERGENCY MEDICINE | Admitting: HOSPITALIST
Payer: COMMERCIAL

## 2025-02-22 ENCOUNTER — APPOINTMENT (OUTPATIENT)
Dept: CT IMAGING | Age: 60
DRG: 286 | End: 2025-02-22
Payer: COMMERCIAL

## 2025-02-22 DIAGNOSIS — I21.4 NSTEMI (NON-ST ELEVATED MYOCARDIAL INFARCTION) (HCC): ICD-10-CM

## 2025-02-22 DIAGNOSIS — M79.89 RIGHT LEG SWELLING: ICD-10-CM

## 2025-02-22 DIAGNOSIS — E11.9 CONTROLLED TYPE 2 DIABETES MELLITUS WITHOUT COMPLICATION, WITHOUT LONG-TERM CURRENT USE OF INSULIN (HCC): Chronic | ICD-10-CM

## 2025-02-22 DIAGNOSIS — J10.1 INFLUENZA A: ICD-10-CM

## 2025-02-22 DIAGNOSIS — R09.02 HYPOXEMIA: Primary | ICD-10-CM

## 2025-02-22 PROBLEM — J96.01 ACUTE HYPOXIC RESPIRATORY FAILURE (HCC): Status: ACTIVE | Noted: 2025-02-22

## 2025-02-22 LAB
ALBUMIN SERPL-MCNC: 3.3 G/DL (ref 3.5–5.2)
ALBUMIN/GLOB SERPL: 0.7 {RATIO} (ref 1–2.5)
ALP SERPL-CCNC: 87 U/L (ref 40–129)
ALT SERPL-CCNC: 21 U/L (ref 5–41)
ANION GAP SERPL CALCULATED.3IONS-SCNC: 13 MMOL/L (ref 9–17)
AST SERPL-CCNC: 15 U/L
BACTERIA URNS QL MICRO: ABNORMAL
BASOPHILS # BLD: 0 K/UL (ref 0–0.2)
BASOPHILS NFR BLD: 0 % (ref 0–2)
BILIRUB SERPL-MCNC: 0.5 MG/DL (ref 0.3–1.2)
BILIRUB UR QL STRIP: NEGATIVE
BNP SERPL-MCNC: 1953 PG/ML
BUN SERPL-MCNC: 27 MG/DL (ref 6–20)
CALCIUM SERPL-MCNC: 9.2 MG/DL (ref 8.6–10.4)
CHARACTER UR: ABNORMAL
CHLORIDE SERPL-SCNC: 92 MMOL/L (ref 98–107)
CLARITY UR: CLEAR
CO2 SERPL-SCNC: 29 MMOL/L (ref 20–31)
COLOR UR: YELLOW
CREAT SERPL-MCNC: 1.4 MG/DL (ref 0.7–1.2)
EOSINOPHIL # BLD: 0 K/UL (ref 0–0.4)
EOSINOPHILS RELATIVE PERCENT: 0 % (ref 1–4)
EPI CELLS #/AREA URNS HPF: ABNORMAL /HPF (ref 0–5)
ERYTHROCYTE [DISTWIDTH] IN BLOOD BY AUTOMATED COUNT: 12.7 % (ref 12.5–15.4)
GFR, ESTIMATED: 58 ML/MIN/1.73M2
GLUCOSE SERPL-MCNC: 171 MG/DL (ref 70–99)
GLUCOSE UR STRIP-MCNC: NEGATIVE MG/DL
HCT VFR BLD AUTO: 41.5 % (ref 41–53)
HGB BLD-MCNC: 14 G/DL (ref 13.5–17.5)
HGB UR QL STRIP.AUTO: ABNORMAL
KETONES UR STRIP-MCNC: NEGATIVE MG/DL
LEUKOCYTE ESTERASE UR QL STRIP: NEGATIVE
LYMPHOCYTES NFR BLD: 0.53 K/UL (ref 1–4.8)
LYMPHOCYTES RELATIVE PERCENT: 2 % (ref 24–44)
MCH RBC QN AUTO: 30.7 PG (ref 26–34)
MCHC RBC AUTO-ENTMCNC: 33.7 G/DL (ref 31–37)
MCV RBC AUTO: 91 FL (ref 80–100)
METAMYELOCYTES ABSOLUTE COUNT: 0.53 K/UL
METAMYELOCYTES: 2 %
MONOCYTES NFR BLD: 1.32 K/UL (ref 0.1–0.8)
MONOCYTES NFR BLD: 5 % (ref 1–7)
MORPHOLOGY: NORMAL
MUCOUS THREADS URNS QL MICRO: ABNORMAL
NEUTROPHILS NFR BLD: 91 % (ref 36–66)
NEUTS SEG NFR BLD: 24.02 K/UL (ref 1.8–7.7)
NITRITE UR QL STRIP: NEGATIVE
PARTIAL THROMBOPLASTIN TIME: 33.9 SEC (ref 24–36)
PH UR STRIP: 6 [PH] (ref 5–8)
PLATELET # BLD AUTO: 357 K/UL (ref 140–450)
PMV BLD AUTO: 8.8 FL (ref 6–12)
POTASSIUM SERPL-SCNC: 4.9 MMOL/L (ref 3.7–5.3)
PROT SERPL-MCNC: 7.8 G/DL (ref 6.4–8.3)
PROT UR STRIP-MCNC: ABNORMAL MG/DL
RBC # BLD AUTO: 4.56 M/UL (ref 4.5–5.9)
RBC #/AREA URNS HPF: ABNORMAL /HPF (ref 0–2)
SODIUM SERPL-SCNC: 134 MMOL/L (ref 135–144)
SP GR UR STRIP: 1.03 (ref 1–1.03)
TROPONIN I SERPL HS-MCNC: 57 NG/L (ref 0–22)
UROBILINOGEN UR STRIP-ACNC: NORMAL EU/DL (ref 0–1)
WBC #/AREA URNS HPF: ABNORMAL /HPF (ref 0–5)
WBC OTHER # BLD: 26.4 K/UL (ref 3.5–11)

## 2025-02-22 PROCEDURE — 94640 AIRWAY INHALATION TREATMENT: CPT

## 2025-02-22 PROCEDURE — 96375 TX/PRO/DX INJ NEW DRUG ADDON: CPT

## 2025-02-22 PROCEDURE — 2500000003 HC RX 250 WO HCPCS: Performed by: EMERGENCY MEDICINE

## 2025-02-22 PROCEDURE — 6360000002 HC RX W HCPCS: Performed by: EMERGENCY MEDICINE

## 2025-02-22 PROCEDURE — 87040 BLOOD CULTURE FOR BACTERIA: CPT

## 2025-02-22 PROCEDURE — 0202U NFCT DS 22 TRGT SARS-COV-2: CPT

## 2025-02-22 PROCEDURE — 6360000004 HC RX CONTRAST MEDICATION: Performed by: EMERGENCY MEDICINE

## 2025-02-22 PROCEDURE — 5A0955A ASSISTANCE WITH RESPIRATORY VENTILATION, GREATER THAN 96 CONSECUTIVE HOURS, HIGH NASAL FLOW/VELOCITY: ICD-10-PCS | Performed by: EMERGENCY MEDICINE

## 2025-02-22 PROCEDURE — 36415 COLL VENOUS BLD VENIPUNCTURE: CPT

## 2025-02-22 PROCEDURE — 99285 EMERGENCY DEPT VISIT HI MDM: CPT

## 2025-02-22 PROCEDURE — 87899 AGENT NOS ASSAY W/OPTIC: CPT

## 2025-02-22 PROCEDURE — 84484 ASSAY OF TROPONIN QUANT: CPT

## 2025-02-22 PROCEDURE — 85025 COMPLETE CBC W/AUTO DIFF WBC: CPT

## 2025-02-22 PROCEDURE — 6370000000 HC RX 637 (ALT 250 FOR IP): Performed by: INTERNAL MEDICINE

## 2025-02-22 PROCEDURE — 6360000002 HC RX W HCPCS: Performed by: INTERNAL MEDICINE

## 2025-02-22 PROCEDURE — 93005 ELECTROCARDIOGRAM TRACING: CPT | Performed by: EMERGENCY MEDICINE

## 2025-02-22 PROCEDURE — 71260 CT THORAX DX C+: CPT

## 2025-02-22 PROCEDURE — 80053 COMPREHEN METABOLIC PANEL: CPT

## 2025-02-22 PROCEDURE — 85730 THROMBOPLASTIN TIME PARTIAL: CPT

## 2025-02-22 PROCEDURE — 81001 URINALYSIS AUTO W/SCOPE: CPT

## 2025-02-22 PROCEDURE — 83880 ASSAY OF NATRIURETIC PEPTIDE: CPT

## 2025-02-22 PROCEDURE — 87641 MR-STAPH DNA AMP PROBE: CPT

## 2025-02-22 PROCEDURE — 6370000000 HC RX 637 (ALT 250 FOR IP): Performed by: EMERGENCY MEDICINE

## 2025-02-22 PROCEDURE — 96374 THER/PROPH/DIAG INJ IV PUSH: CPT

## 2025-02-22 PROCEDURE — 2700000000 HC OXYGEN THERAPY PER DAY

## 2025-02-22 PROCEDURE — 99223 1ST HOSP IP/OBS HIGH 75: CPT | Performed by: INTERNAL MEDICINE

## 2025-02-22 PROCEDURE — 2060000000 HC ICU INTERMEDIATE R&B

## 2025-02-22 PROCEDURE — 2580000003 HC RX 258: Performed by: EMERGENCY MEDICINE

## 2025-02-22 RX ORDER — MAGNESIUM SULFATE IN WATER 40 MG/ML
2000 INJECTION, SOLUTION INTRAVENOUS PRN
Status: DISCONTINUED | OUTPATIENT
Start: 2025-02-22 | End: 2025-03-05 | Stop reason: HOSPADM

## 2025-02-22 RX ORDER — ONDANSETRON 2 MG/ML
4 INJECTION INTRAMUSCULAR; INTRAVENOUS EVERY 6 HOURS PRN
Status: DISCONTINUED | OUTPATIENT
Start: 2025-02-22 | End: 2025-03-05 | Stop reason: HOSPADM

## 2025-02-22 RX ORDER — IPRATROPIUM BROMIDE AND ALBUTEROL SULFATE 2.5; .5 MG/3ML; MG/3ML
1 SOLUTION RESPIRATORY (INHALATION)
Status: DISCONTINUED | OUTPATIENT
Start: 2025-02-22 | End: 2025-02-25

## 2025-02-22 RX ORDER — HEPARIN SODIUM 1000 [USP'U]/ML
40 INJECTION, SOLUTION INTRAVENOUS; SUBCUTANEOUS PRN
Status: DISCONTINUED | OUTPATIENT
Start: 2025-02-22 | End: 2025-02-23

## 2025-02-22 RX ORDER — 0.9 % SODIUM CHLORIDE 0.9 %
80 INTRAVENOUS SOLUTION INTRAVENOUS ONCE
Status: DISCONTINUED | OUTPATIENT
Start: 2025-02-22 | End: 2025-02-23

## 2025-02-22 RX ORDER — IOPAMIDOL 755 MG/ML
75 INJECTION, SOLUTION INTRAVASCULAR
Status: COMPLETED | OUTPATIENT
Start: 2025-02-22 | End: 2025-02-22

## 2025-02-22 RX ORDER — SODIUM CHLORIDE 0.9 % (FLUSH) 0.9 %
10 SYRINGE (ML) INJECTION ONCE
Status: COMPLETED | OUTPATIENT
Start: 2025-02-22 | End: 2025-02-22

## 2025-02-22 RX ORDER — ACETAMINOPHEN 650 MG/1
650 SUPPOSITORY RECTAL EVERY 6 HOURS PRN
Status: DISCONTINUED | OUTPATIENT
Start: 2025-02-22 | End: 2025-03-05 | Stop reason: HOSPADM

## 2025-02-22 RX ORDER — SODIUM CHLORIDE 0.9 % (FLUSH) 0.9 %
5-40 SYRINGE (ML) INJECTION EVERY 12 HOURS SCHEDULED
Status: DISCONTINUED | OUTPATIENT
Start: 2025-02-22 | End: 2025-03-05 | Stop reason: HOSPADM

## 2025-02-22 RX ORDER — SODIUM CHLORIDE 0.9 % (FLUSH) 0.9 %
5-40 SYRINGE (ML) INJECTION PRN
Status: DISCONTINUED | OUTPATIENT
Start: 2025-02-22 | End: 2025-03-05 | Stop reason: HOSPADM

## 2025-02-22 RX ORDER — POTASSIUM CHLORIDE 1500 MG/1
40 TABLET, EXTENDED RELEASE ORAL PRN
Status: DISCONTINUED | OUTPATIENT
Start: 2025-02-22 | End: 2025-03-05 | Stop reason: HOSPADM

## 2025-02-22 RX ORDER — ASPIRIN 81 MG/1
81 TABLET, CHEWABLE ORAL DAILY
Status: DISCONTINUED | OUTPATIENT
Start: 2025-02-23 | End: 2025-03-05 | Stop reason: HOSPADM

## 2025-02-22 RX ORDER — HEPARIN SODIUM 1000 [USP'U]/ML
80 INJECTION, SOLUTION INTRAVENOUS; SUBCUTANEOUS ONCE
Status: COMPLETED | OUTPATIENT
Start: 2025-02-22 | End: 2025-02-22

## 2025-02-22 RX ORDER — POTASSIUM CHLORIDE 7.45 MG/ML
10 INJECTION INTRAVENOUS PRN
Status: DISCONTINUED | OUTPATIENT
Start: 2025-02-22 | End: 2025-03-05 | Stop reason: HOSPADM

## 2025-02-22 RX ORDER — HEPARIN SODIUM 1000 [USP'U]/ML
80 INJECTION, SOLUTION INTRAVENOUS; SUBCUTANEOUS PRN
Status: DISCONTINUED | OUTPATIENT
Start: 2025-02-22 | End: 2025-02-23

## 2025-02-22 RX ORDER — ONDANSETRON 4 MG/1
4 TABLET, ORALLY DISINTEGRATING ORAL EVERY 8 HOURS PRN
Status: DISCONTINUED | OUTPATIENT
Start: 2025-02-22 | End: 2025-03-05 | Stop reason: HOSPADM

## 2025-02-22 RX ORDER — SODIUM CHLORIDE 9 MG/ML
INJECTION, SOLUTION INTRAVENOUS PRN
Status: DISCONTINUED | OUTPATIENT
Start: 2025-02-22 | End: 2025-03-05 | Stop reason: HOSPADM

## 2025-02-22 RX ORDER — ACETAMINOPHEN 325 MG/1
650 TABLET ORAL EVERY 6 HOURS PRN
Status: DISCONTINUED | OUTPATIENT
Start: 2025-02-22 | End: 2025-03-05 | Stop reason: HOSPADM

## 2025-02-22 RX ORDER — IPRATROPIUM BROMIDE AND ALBUTEROL SULFATE 2.5; .5 MG/3ML; MG/3ML
1 SOLUTION RESPIRATORY (INHALATION) EVERY 4 HOURS PRN
Status: DISCONTINUED | OUTPATIENT
Start: 2025-02-22 | End: 2025-03-05 | Stop reason: HOSPADM

## 2025-02-22 RX ORDER — FUROSEMIDE 10 MG/ML
20 INJECTION INTRAMUSCULAR; INTRAVENOUS ONCE
Status: COMPLETED | OUTPATIENT
Start: 2025-02-22 | End: 2025-02-22

## 2025-02-22 RX ORDER — BUDESONIDE AND FORMOTEROL FUMARATE DIHYDRATE 160; 4.5 UG/1; UG/1
2 AEROSOL RESPIRATORY (INHALATION)
Status: DISCONTINUED | OUTPATIENT
Start: 2025-02-22 | End: 2025-03-05 | Stop reason: HOSPADM

## 2025-02-22 RX ORDER — POLYETHYLENE GLYCOL 3350 17 G/17G
17 POWDER, FOR SOLUTION ORAL DAILY PRN
Status: DISCONTINUED | OUTPATIENT
Start: 2025-02-22 | End: 2025-03-05 | Stop reason: HOSPADM

## 2025-02-22 RX ORDER — FENTANYL CITRATE 50 UG/ML
25 INJECTION, SOLUTION INTRAMUSCULAR; INTRAVENOUS ONCE
Status: COMPLETED | OUTPATIENT
Start: 2025-02-22 | End: 2025-02-22

## 2025-02-22 RX ORDER — HEPARIN SODIUM 10000 [USP'U]/100ML
5-30 INJECTION, SOLUTION INTRAVENOUS CONTINUOUS
Status: DISCONTINUED | OUTPATIENT
Start: 2025-02-22 | End: 2025-02-23

## 2025-02-22 RX ADMIN — IPRATROPIUM BROMIDE AND ALBUTEROL SULFATE 1 DOSE: .5; 2.5 SOLUTION RESPIRATORY (INHALATION) at 22:51

## 2025-02-22 RX ADMIN — CEFEPIME 2000 MG: 2 INJECTION, POWDER, FOR SOLUTION INTRAVENOUS at 21:13

## 2025-02-22 RX ADMIN — HEPARIN SODIUM 18 UNITS/KG/HR: 10000 INJECTION, SOLUTION INTRAVENOUS at 21:32

## 2025-02-22 RX ADMIN — HEPARIN SODIUM 7800 UNITS: 1000 INJECTION INTRAVENOUS; SUBCUTANEOUS at 21:28

## 2025-02-22 RX ADMIN — IOPAMIDOL 75 ML: 755 INJECTION, SOLUTION INTRAVENOUS at 20:11

## 2025-02-22 RX ADMIN — SODIUM CHLORIDE, PRESERVATIVE FREE 10 ML: 5 INJECTION INTRAVENOUS at 20:11

## 2025-02-22 RX ADMIN — HYDROMORPHONE HYDROCHLORIDE 1 MG: 1 INJECTION, SOLUTION INTRAMUSCULAR; INTRAVENOUS; SUBCUTANEOUS at 22:20

## 2025-02-22 RX ADMIN — Medication 100 ML: at 20:11

## 2025-02-22 RX ADMIN — FUROSEMIDE 20 MG: 10 INJECTION, SOLUTION INTRAMUSCULAR; INTRAVENOUS at 18:45

## 2025-02-22 RX ADMIN — AZITHROMYCIN MONOHYDRATE 500 MG: 500 INJECTION, POWDER, LYOPHILIZED, FOR SOLUTION INTRAVENOUS at 21:46

## 2025-02-22 RX ADMIN — IPRATROPIUM BROMIDE AND ALBUTEROL SULFATE 1 DOSE: 2.5; .5 SOLUTION RESPIRATORY (INHALATION) at 18:29

## 2025-02-22 RX ADMIN — METHYLPREDNISOLONE SODIUM SUCCINATE 60 MG: 125 INJECTION, POWDER, FOR SOLUTION INTRAMUSCULAR; INTRAVENOUS at 18:45

## 2025-02-22 RX ADMIN — FENTANYL CITRATE 25 MCG: 50 INJECTION, SOLUTION INTRAMUSCULAR; INTRAVENOUS at 20:05

## 2025-02-22 ASSESSMENT — PAIN - FUNCTIONAL ASSESSMENT
PAIN_FUNCTIONAL_ASSESSMENT: 0-10
PAIN_FUNCTIONAL_ASSESSMENT: PREVENTS OR INTERFERES WITH MANY ACTIVE NOT PASSIVE ACTIVITIES

## 2025-02-22 ASSESSMENT — PAIN SCALES - GENERAL
PAINLEVEL_OUTOF10: 0
PAINLEVEL_OUTOF10: 10
PAINLEVEL_OUTOF10: 8
PAINLEVEL_OUTOF10: 10
PAINLEVEL_OUTOF10: 8
PAINLEVEL_OUTOF10: 0

## 2025-02-22 ASSESSMENT — PAIN DESCRIPTION - DESCRIPTORS
DESCRIPTORS: ACHING
DESCRIPTORS: OTHER (COMMENT)

## 2025-02-22 ASSESSMENT — PAIN DESCRIPTION - PAIN TYPE
TYPE: ACUTE PAIN
TYPE: ACUTE PAIN

## 2025-02-22 ASSESSMENT — PAIN DESCRIPTION - ORIENTATION
ORIENTATION: RIGHT
ORIENTATION: RIGHT

## 2025-02-22 ASSESSMENT — PAIN DESCRIPTION - LOCATION
LOCATION: RIB CAGE
LOCATION: CHEST

## 2025-02-22 NOTE — ED PROVIDER NOTES
patient is alert and oriented, appears only mildly dyspneic.  Speaking in full sentences.  Nevertheless, the patient is hypoxic on supplemental oxygen with a sat of 85% on 3 L nasal cannula.  HEENT is atraumatic.    Pupils are PERRL at 4 mm with normal extraocular motion.    Mucous membranes moist.    Neck is supple.  Heart sounds regular rate and rhythm with no gallops, murmurs, or rubs.    Lungs diminished in right base with some rales in the left base.  Abdomen: soft, nontender with no pain to palpation.  No pulsatile mass.  Normal bowel sounds are noted.  No rebound or guarding.    Musculoskeletal exam shows no evidence of trauma.  Normal distal pulses in all extremities.  Skin: 2-3+ edema in legs bilaterally.  Neurological exam reveals cranial nerves 2 through 12 grossly intact.  Patient has equal  and normal deep tendon reflexes.    Psychiatric: no hallucinations or suicidal ideation.   Lymphatics.:  No lymphadenopathy.  Bilateral leg edema as noted above.  No lymphangitis      DIFFERENTIAL DIAGNOSIS/ MDM:     Differential diagnosis considered: CHF, pleural effusion, COPD, AMI, ACS, PE    Chronic Conditions affecting care (DM,HTN,CA, etc): COPD, diabetes, hypertension    Social Determinants of Health affecting care (unable to care for self, lives alone, unemployed, homeless,etc): None      History source(s) (patient,spouse,parent,family,friend,EMS,etc): Patient and spouse    Review of external sources ([facility],Hospital records,EMS report, radiology reports, etc): Previous hospital records including inpatient records indicating admission for influenza, hypoxia, and syncope    Tests considered but not ordered: None      Independent interpretation of tests (eg.  X-ray, CAT scan, Doppler studies, EKG): EKG    Discussion of x-ray results with radiology:   2037  [facility]:  Small, subsegmental PE in RLL.  Increased opacities bilaterally.    Consults:   2048  Discussed with Dr. Arriaza.  Please continue

## 2025-02-23 PROBLEM — I26.99 ACUTE PULMONARY EMBOLISM WITHOUT ACUTE COR PULMONALE (HCC): Status: ACTIVE | Noted: 2025-02-23

## 2025-02-23 PROBLEM — J18.9 HCAP (HEALTHCARE-ASSOCIATED PNEUMONIA): Status: ACTIVE | Noted: 2025-02-23

## 2025-02-23 PROBLEM — I50.33 ACUTE ON CHRONIC HEART FAILURE WITH PRESERVED EJECTION FRACTION (HCC): Status: ACTIVE | Noted: 2025-02-23

## 2025-02-23 LAB
ALLEN TEST: POSITIVE
ALLEN TEST: POSITIVE
B PARAP IS1001 DNA NPH QL NAA+NON-PROBE: NOT DETECTED
B PERT DNA SPEC QL NAA+PROBE: NOT DETECTED
BASOPHILS # BLD: 0 K/UL (ref 0–0.2)
BASOPHILS NFR BLD: 0 % (ref 0–2)
C PNEUM DNA NPH QL NAA+NON-PROBE: NOT DETECTED
EOSINOPHIL # BLD: 0 K/UL (ref 0–0.4)
EOSINOPHILS RELATIVE PERCENT: 0 % (ref 1–4)
ERYTHROCYTE [DISTWIDTH] IN BLOOD BY AUTOMATED COUNT: 12.7 % (ref 12.5–15.4)
FIO2: 70
FIO2: 90
FLUAV RNA NPH QL NAA+NON-PROBE: NOT DETECTED
FLUBV RNA NPH QL NAA+NON-PROBE: NOT DETECTED
GLUCOSE BLD-MCNC: 236 MG/DL (ref 75–110)
GLUCOSE BLD-MCNC: 277 MG/DL (ref 75–110)
HADV DNA NPH QL NAA+NON-PROBE: NOT DETECTED
HCOV 229E RNA NPH QL NAA+NON-PROBE: NOT DETECTED
HCOV HKU1 RNA NPH QL NAA+NON-PROBE: NOT DETECTED
HCOV NL63 RNA NPH QL NAA+NON-PROBE: NOT DETECTED
HCOV OC43 RNA NPH QL NAA+NON-PROBE: NOT DETECTED
HCT VFR BLD AUTO: 36.1 % (ref 41–53)
HGB BLD-MCNC: 12.3 G/DL (ref 13.5–17.5)
HMPV RNA NPH QL NAA+NON-PROBE: NOT DETECTED
HPIV1 RNA NPH QL NAA+NON-PROBE: NOT DETECTED
HPIV2 RNA NPH QL NAA+NON-PROBE: NOT DETECTED
HPIV3 RNA NPH QL NAA+NON-PROBE: NOT DETECTED
HPIV4 RNA NPH QL NAA+NON-PROBE: NOT DETECTED
LYMPHOCYTES NFR BLD: 0.61 K/UL (ref 1–4.8)
LYMPHOCYTES RELATIVE PERCENT: 2 % (ref 24–44)
M PNEUMO DNA NPH QL NAA+NON-PROBE: NOT DETECTED
MCH RBC QN AUTO: 30.9 PG (ref 26–34)
MCHC RBC AUTO-ENTMCNC: 34.1 G/DL (ref 31–37)
MCV RBC AUTO: 90.7 FL (ref 80–100)
MODE: ABNORMAL
MONOCYTES NFR BLD: 0.92 K/UL (ref 0.1–0.8)
MONOCYTES NFR BLD: 3 % (ref 1–7)
MORPHOLOGY: NORMAL
NEUTROPHILS NFR BLD: 95 % (ref 36–66)
NEUTS SEG NFR BLD: 29.17 K/UL (ref 1.8–7.7)
O2 DELIVERY DEVICE: ABNORMAL
O2 DELIVERY DEVICE: ABNORMAL
PARTIAL THROMBOPLASTIN TIME: 87.8 SEC (ref 24–36)
PLATELET # BLD AUTO: 280 K/UL (ref 140–450)
PMV BLD AUTO: 8.7 FL (ref 6–12)
POC HCO3: 26.5 MMOL/L (ref 21–28)
POC HCO3: 29.8 MMOL/L (ref 21–28)
POC O2 SATURATION: 93.6 % (ref 94–98)
POC O2 SATURATION: 99.4 % (ref 94–98)
POC PCO2: 42.9 MM HG (ref 35–48)
POC PCO2: 53.5 MM HG (ref 35–48)
POC PH: 7.35 (ref 7.35–7.45)
POC PH: 7.4 (ref 7.35–7.45)
POC PO2: 161.9 MM HG (ref 83–108)
POC PO2: 73.8 MM HG (ref 83–108)
POSITIVE BASE EXCESS, ART: 1.4 MMOL/L (ref 0–3)
POSITIVE BASE EXCESS, ART: 3.1 MMOL/L (ref 0–3)
RBC # BLD AUTO: 3.98 M/UL (ref 4.5–5.9)
RSV RNA NPH QL NAA+NON-PROBE: NOT DETECTED
RV+EV RNA NPH QL NAA+NON-PROBE: NOT DETECTED
S PNEUM AG SPEC QL: NORMAL
SAMPLE SITE: ABNORMAL
SAMPLE SITE: ABNORMAL
SARS-COV-2 RNA NPH QL NAA+NON-PROBE: NOT DETECTED
SPECIMEN DESCRIPTION: NORMAL
SPECIMEN SOURCE: NORMAL
WBC OTHER # BLD: 30.7 K/UL (ref 3.5–11)

## 2025-02-23 PROCEDURE — 6370000000 HC RX 637 (ALT 250 FOR IP): Performed by: STUDENT IN AN ORGANIZED HEALTH CARE EDUCATION/TRAINING PROGRAM

## 2025-02-23 PROCEDURE — 2500000003 HC RX 250 WO HCPCS: Performed by: STUDENT IN AN ORGANIZED HEALTH CARE EDUCATION/TRAINING PROGRAM

## 2025-02-23 PROCEDURE — 82803 BLOOD GASES ANY COMBINATION: CPT

## 2025-02-23 PROCEDURE — 2060000000 HC ICU INTERMEDIATE R&B

## 2025-02-23 PROCEDURE — 36600 WITHDRAWAL OF ARTERIAL BLOOD: CPT

## 2025-02-23 PROCEDURE — 2500000003 HC RX 250 WO HCPCS: Performed by: INTERNAL MEDICINE

## 2025-02-23 PROCEDURE — 6370000000 HC RX 637 (ALT 250 FOR IP): Performed by: INTERNAL MEDICINE

## 2025-02-23 PROCEDURE — 6360000002 HC RX W HCPCS: Performed by: INTERNAL MEDICINE

## 2025-02-23 PROCEDURE — 6370000000 HC RX 637 (ALT 250 FOR IP): Performed by: NURSE PRACTITIONER

## 2025-02-23 PROCEDURE — 85730 THROMBOPLASTIN TIME PARTIAL: CPT

## 2025-02-23 PROCEDURE — 85025 COMPLETE CBC W/AUTO DIFF WBC: CPT

## 2025-02-23 PROCEDURE — 94640 AIRWAY INHALATION TREATMENT: CPT

## 2025-02-23 PROCEDURE — 99232 SBSQ HOSP IP/OBS MODERATE 35: CPT | Performed by: STUDENT IN AN ORGANIZED HEALTH CARE EDUCATION/TRAINING PROGRAM

## 2025-02-23 PROCEDURE — 94761 N-INVAS EAR/PLS OXIMETRY MLT: CPT

## 2025-02-23 PROCEDURE — 36415 COLL VENOUS BLD VENIPUNCTURE: CPT

## 2025-02-23 PROCEDURE — 94660 CPAP INITIATION&MGMT: CPT

## 2025-02-23 PROCEDURE — 2580000003 HC RX 258: Performed by: INTERNAL MEDICINE

## 2025-02-23 PROCEDURE — 82947 ASSAY GLUCOSE BLOOD QUANT: CPT

## 2025-02-23 PROCEDURE — 2700000000 HC OXYGEN THERAPY PER DAY

## 2025-02-23 PROCEDURE — 2580000003 HC RX 258: Performed by: STUDENT IN AN ORGANIZED HEALTH CARE EDUCATION/TRAINING PROGRAM

## 2025-02-23 PROCEDURE — 6360000002 HC RX W HCPCS: Performed by: STUDENT IN AN ORGANIZED HEALTH CARE EDUCATION/TRAINING PROGRAM

## 2025-02-23 PROCEDURE — 5A09557 ASSISTANCE WITH RESPIRATORY VENTILATION, GREATER THAN 96 CONSECUTIVE HOURS, CONTINUOUS POSITIVE AIRWAY PRESSURE: ICD-10-PCS | Performed by: STUDENT IN AN ORGANIZED HEALTH CARE EDUCATION/TRAINING PROGRAM

## 2025-02-23 RX ORDER — GUAIFENESIN 600 MG/1
600 TABLET, EXTENDED RELEASE ORAL 2 TIMES DAILY
Status: DISCONTINUED | OUTPATIENT
Start: 2025-02-23 | End: 2025-02-23

## 2025-02-23 RX ORDER — GUAIFENESIN 600 MG/1
1200 TABLET, EXTENDED RELEASE ORAL 2 TIMES DAILY
Status: DISCONTINUED | OUTPATIENT
Start: 2025-02-23 | End: 2025-03-05 | Stop reason: HOSPADM

## 2025-02-23 RX ORDER — TRAMADOL HYDROCHLORIDE 50 MG/1
25 TABLET ORAL EVERY 4 HOURS PRN
Status: DISCONTINUED | OUTPATIENT
Start: 2025-02-23 | End: 2025-03-03

## 2025-02-23 RX ORDER — ATORVASTATIN CALCIUM 20 MG/1
20 TABLET, FILM COATED ORAL DAILY
Status: DISCONTINUED | OUTPATIENT
Start: 2025-02-23 | End: 2025-03-05 | Stop reason: HOSPADM

## 2025-02-23 RX ORDER — LOSARTAN POTASSIUM 50 MG/1
50 TABLET ORAL DAILY
Status: DISCONTINUED | OUTPATIENT
Start: 2025-02-23 | End: 2025-02-24

## 2025-02-23 RX ORDER — GLUCAGON 1 MG/ML
1 KIT INJECTION PRN
Status: DISCONTINUED | OUTPATIENT
Start: 2025-02-23 | End: 2025-03-05 | Stop reason: HOSPADM

## 2025-02-23 RX ORDER — BENZONATATE 100 MG/1
200 CAPSULE ORAL 3 TIMES DAILY
Status: COMPLETED | OUTPATIENT
Start: 2025-02-23 | End: 2025-02-23

## 2025-02-23 RX ORDER — FUROSEMIDE 10 MG/ML
20 INJECTION INTRAMUSCULAR; INTRAVENOUS 2 TIMES DAILY
Status: DISCONTINUED | OUTPATIENT
Start: 2025-02-23 | End: 2025-02-23

## 2025-02-23 RX ORDER — AMLODIPINE BESYLATE 10 MG/1
10 TABLET ORAL DAILY
Status: DISCONTINUED | OUTPATIENT
Start: 2025-02-23 | End: 2025-02-23

## 2025-02-23 RX ORDER — SPIRONOLACTONE 25 MG/1
25 TABLET ORAL DAILY
Status: DISCONTINUED | OUTPATIENT
Start: 2025-02-23 | End: 2025-03-05 | Stop reason: HOSPADM

## 2025-02-23 RX ORDER — CARVEDILOL 12.5 MG/1
25 TABLET ORAL 2 TIMES DAILY WITH MEALS
Status: DISCONTINUED | OUTPATIENT
Start: 2025-02-23 | End: 2025-03-05 | Stop reason: HOSPADM

## 2025-02-23 RX ORDER — FUROSEMIDE 10 MG/ML
40 INJECTION INTRAMUSCULAR; INTRAVENOUS 2 TIMES DAILY
Status: DISCONTINUED | OUTPATIENT
Start: 2025-02-23 | End: 2025-03-03

## 2025-02-23 RX ORDER — FUROSEMIDE 10 MG/ML
40 INJECTION INTRAMUSCULAR; INTRAVENOUS ONCE
Status: COMPLETED | OUTPATIENT
Start: 2025-02-23 | End: 2025-02-23

## 2025-02-23 RX ORDER — INSULIN LISPRO 100 [IU]/ML
0-4 INJECTION, SOLUTION INTRAVENOUS; SUBCUTANEOUS
Status: DISCONTINUED | OUTPATIENT
Start: 2025-02-23 | End: 2025-02-25

## 2025-02-23 RX ORDER — DEXTROSE MONOHYDRATE 100 MG/ML
INJECTION, SOLUTION INTRAVENOUS CONTINUOUS PRN
Status: DISCONTINUED | OUTPATIENT
Start: 2025-02-23 | End: 2025-03-05 | Stop reason: HOSPADM

## 2025-02-23 RX ADMIN — ATORVASTATIN CALCIUM 20 MG: 20 TABLET, FILM COATED ORAL at 10:08

## 2025-02-23 RX ADMIN — APIXABAN 10 MG: 5 TABLET, FILM COATED ORAL at 10:09

## 2025-02-23 RX ADMIN — TRAMADOL HYDROCHLORIDE 25 MG: 50 TABLET, COATED ORAL at 20:24

## 2025-02-23 RX ADMIN — FUROSEMIDE 20 MG: 10 INJECTION, SOLUTION INTRAMUSCULAR; INTRAVENOUS at 10:10

## 2025-02-23 RX ADMIN — SPIRONOLACTONE 25 MG: 25 TABLET, FILM COATED ORAL at 13:43

## 2025-02-23 RX ADMIN — HYDROMORPHONE HYDROCHLORIDE 1 MG: 1 INJECTION, SOLUTION INTRAMUSCULAR; INTRAVENOUS; SUBCUTANEOUS at 21:58

## 2025-02-23 RX ADMIN — HYDROMORPHONE HYDROCHLORIDE 1 MG: 1 INJECTION, SOLUTION INTRAMUSCULAR; INTRAVENOUS; SUBCUTANEOUS at 02:18

## 2025-02-23 RX ADMIN — IPRATROPIUM BROMIDE AND ALBUTEROL SULFATE 1 DOSE: 2.5; .5 SOLUTION RESPIRATORY (INHALATION) at 11:59

## 2025-02-23 RX ADMIN — ASPIRIN 81 MG: 81 TABLET, CHEWABLE ORAL at 10:10

## 2025-02-23 RX ADMIN — DOXYCYCLINE 100 MG: 100 INJECTION, POWDER, LYOPHILIZED, FOR SOLUTION INTRAVENOUS at 02:59

## 2025-02-23 RX ADMIN — SODIUM CHLORIDE, PRESERVATIVE FREE 10 ML: 5 INJECTION INTRAVENOUS at 08:17

## 2025-02-23 RX ADMIN — SODIUM CHLORIDE 1500 MG: 0.9 INJECTION, SOLUTION INTRAVENOUS at 11:53

## 2025-02-23 RX ADMIN — TRAMADOL HYDROCHLORIDE 25 MG: 50 TABLET, COATED ORAL at 10:08

## 2025-02-23 RX ADMIN — APIXABAN 10 MG: 5 TABLET, FILM COATED ORAL at 20:30

## 2025-02-23 RX ADMIN — FUROSEMIDE 40 MG: 10 INJECTION, SOLUTION INTRAMUSCULAR; INTRAVENOUS at 05:30

## 2025-02-23 RX ADMIN — CEFEPIME 2000 MG: 2 INJECTION, POWDER, FOR SOLUTION INTRAVENOUS at 21:37

## 2025-02-23 RX ADMIN — HYDROMORPHONE HYDROCHLORIDE 1 MG: 1 INJECTION, SOLUTION INTRAMUSCULAR; INTRAVENOUS; SUBCUTANEOUS at 13:34

## 2025-02-23 RX ADMIN — INSULIN LISPRO 1 UNITS: 100 INJECTION, SOLUTION INTRAVENOUS; SUBCUTANEOUS at 20:25

## 2025-02-23 RX ADMIN — BUDESONIDE AND FORMOTEROL FUMARATE DIHYDRATE 2 PUFF: 160; 4.5 AEROSOL RESPIRATORY (INHALATION) at 07:36

## 2025-02-23 RX ADMIN — CARVEDILOL 25 MG: 12.5 TABLET, FILM COATED ORAL at 17:42

## 2025-02-23 RX ADMIN — IPRATROPIUM BROMIDE AND ALBUTEROL SULFATE 1 DOSE: 2.5; .5 SOLUTION RESPIRATORY (INHALATION) at 15:21

## 2025-02-23 RX ADMIN — IPRATROPIUM BROMIDE AND ALBUTEROL SULFATE 1 DOSE: 2.5; .5 SOLUTION RESPIRATORY (INHALATION) at 20:58

## 2025-02-23 RX ADMIN — CARVEDILOL 25 MG: 12.5 TABLET, FILM COATED ORAL at 10:09

## 2025-02-23 RX ADMIN — HYDROMORPHONE HYDROCHLORIDE 1 MG: 1 INJECTION, SOLUTION INTRAMUSCULAR; INTRAVENOUS; SUBCUTANEOUS at 08:17

## 2025-02-23 RX ADMIN — IPRATROPIUM BROMIDE AND ALBUTEROL SULFATE 1 DOSE: 2.5; .5 SOLUTION RESPIRATORY (INHALATION) at 07:36

## 2025-02-23 RX ADMIN — BUDESONIDE AND FORMOTEROL FUMARATE DIHYDRATE 2 PUFF: 160; 4.5 AEROSOL RESPIRATORY (INHALATION) at 20:59

## 2025-02-23 RX ADMIN — FUROSEMIDE 40 MG: 10 INJECTION, SOLUTION INTRAMUSCULAR; INTRAVENOUS at 17:41

## 2025-02-23 RX ADMIN — EMPAGLIFLOZIN 10 MG: 10 TABLET, FILM COATED ORAL at 13:43

## 2025-02-23 RX ADMIN — GUAIFENESIN 1200 MG: 600 TABLET, EXTENDED RELEASE ORAL at 20:25

## 2025-02-23 RX ADMIN — TRAMADOL HYDROCHLORIDE 25 MG: 50 TABLET, COATED ORAL at 04:28

## 2025-02-23 RX ADMIN — BENZONATATE 200 MG: 100 CAPSULE ORAL at 10:08

## 2025-02-23 RX ADMIN — GUAIFENESIN 1200 MG: 600 TABLET, EXTENDED RELEASE ORAL at 10:22

## 2025-02-23 RX ADMIN — VANCOMYCIN HYDROCHLORIDE 1000 MG: 1 INJECTION, POWDER, LYOPHILIZED, FOR SOLUTION INTRAVENOUS at 22:01

## 2025-02-23 RX ADMIN — HYDROMORPHONE HYDROCHLORIDE 1 MG: 1 INJECTION, SOLUTION INTRAMUSCULAR; INTRAVENOUS; SUBCUTANEOUS at 17:42

## 2025-02-23 RX ADMIN — CEFEPIME 2000 MG: 2 INJECTION, POWDER, FOR SOLUTION INTRAVENOUS at 10:05

## 2025-02-23 RX ADMIN — INSULIN LISPRO 2 UNITS: 100 INJECTION, SOLUTION INTRAVENOUS; SUBCUTANEOUS at 17:56

## 2025-02-23 ASSESSMENT — PAIN SCALES - GENERAL
PAINLEVEL_OUTOF10: 7
PAINLEVEL_OUTOF10: 8
PAINLEVEL_OUTOF10: 7
PAINLEVEL_OUTOF10: 9
PAINLEVEL_OUTOF10: 6
PAINLEVEL_OUTOF10: 8
PAINLEVEL_OUTOF10: 6
PAINLEVEL_OUTOF10: 7

## 2025-02-23 ASSESSMENT — PAIN DESCRIPTION - ORIENTATION
ORIENTATION: RIGHT
ORIENTATION: RIGHT

## 2025-02-23 ASSESSMENT — PAIN DESCRIPTION - DESCRIPTORS: DESCRIPTORS: PATIENT UNABLE TO DESCRIBE

## 2025-02-23 ASSESSMENT — PAIN DESCRIPTION - PAIN TYPE
TYPE: ACUTE PAIN
TYPE: ACUTE PAIN

## 2025-02-23 ASSESSMENT — PAIN DESCRIPTION - LOCATION
LOCATION: CHEST
LOCATION: CHEST

## 2025-02-23 NOTE — ED NOTES
ED to inpatient nurses report      Chief Complaint:  Chief Complaint   Patient presents with    Shortness of Breath     Breathing difficulty and right sided rib pains.  Recent admit to hospital and discharge 3 days ago.,       Present to ED from: home    MOA:     LOC: alert and orientated to name, place, date  Mobility: Independent  Oxygen Baseline: 2 liters    Current needs required: high flow   Pending ED orders: Zithromax needs to be given  Present condition: stable    Why did the patient come to the ED? sob  What is the plan? admission  Any procedures or intervention occur? none  Any safety concerns?? Fall risk  CODE STATUS Full Code  Diet ADULT DIET; Regular    Mental Status:  Level of Consciousness: Alert (0)    Psych Assessment:      Vital signs   Vitals:    02/22/25 1814 02/22/25 1845 02/22/25 2005 02/22/25 2012   BP: (!) 166/68 (!) 155/68     Pulse: 84      Resp: 22 22 22   Temp: 99.1 °F (37.3 °C)      TempSrc: Oral      SpO2: (!) 85%      Weight: 98 kg (216 lb 0.8 oz)      Height: 1.8 m (5' 10.87\")           Vitals:  Patient Vitals for the past 24 hrs:   BP Temp Temp src Pulse Resp SpO2 Height Weight   02/22/25 2012 -- -- -- -- 22 -- -- --   02/22/25 2005 -- -- -- -- 22 -- -- --   02/22/25 1845 (!) 155/68 -- -- -- -- -- -- --   02/22/25 1814 (!) 166/68 99.1 °F (37.3 °C) Oral 84 22 (!) 85 % 1.8 m (5' 10.87\") 98 kg (216 lb 0.8 oz)      Visit Vitals  BP (!) 155/68   Pulse 84   Temp 99.1 °F (37.3 °C) (Oral)   Resp 22   Ht 1.8 m (5' 10.87\")   Wt 98 kg (216 lb 0.8 oz)   SpO2 (!) 85%   BMI 30.25 kg/m²        LDAs:   Peripheral IV 02/22/25 Right Antecubital (Active)   Site Assessment Clean, dry & intact 02/22/25 1844   Line Status Blood return noted;Flushed;Specimen collected 02/22/25 1844   Line Care Connections checked and tightened 02/22/25 1844   Phlebitis Assessment No symptoms 02/22/25 1844   Infiltration Assessment 0 02/22/25 1844   Dressing Status Clean, dry & intact;New dressing applied 02/22/25 1844

## 2025-02-23 NOTE — H&P
weight   -Differential includes amlodipine induced leg edema.       #acute thromboembolic PE  -confirmed on CTA. NM perfusion testing +, lower limb duplex negative last week  -no signs of right heart strain, troponin peaked at 54, lft wnml         #FATOUMATA  -cr 1.4, was wnml when discharged recently. Echo shows nml EF.   -Hold home losartain. Was discontinued by cardio last week.   -renal dose meds,   -placed on lasix for leg edema/fluid retension. Monitor creatine  - see     #copd  -on supplemental 02 at this time. On 1=2L more then baseline. No prominent wheezing. He reports cough is not forceful.   -on inhaled seriod. 2 packs a day for 30 years down to 10 cigarettes/day. Uses inhalers at [place]. Follows  but often misses follow up visits.   Currently on Proventil nebulizer treatments  Currently on Symbicort Spiriva, resume        #HTN  -on steriods for recent  respiratory failure. Coreg recently increased to 25. Recently started on amlodipine 10mg. Ib neprolol 50mg bidEcho showing nml EF.   -consider starting lasix low dose.       #DM2  -known hx of DM2.  On metformin, canagliflozin, glimepride, semaglutide, gabapentin,     #recurrent syncope  -presumed to be from vasovgal, dehydration.  -echo as per above        Consultations:   None    Patient is admitted as inpatient status because of co-morbidities listed above, severity of signs and symptoms as outlined, requirement for current medical therapies and most importantly because of direct risk to patient if care not provided in a hospital setting.  Expected length of stay > 48 hours.    Santa Arriaza MD  2/22/2025  8:47 PM    Copy sent to Angelita Howard MD

## 2025-02-23 NOTE — RT PROTOCOL NOTE
RT Inhaler-Nebulizer Bronchodilator Protocol Note    There is a bronchodilator order in the chart from a provider indicating to follow the RT Bronchodilator Protocol and there is an “Initiate RT Inhaler-Nebulizer Bronchodilator Protocol” order as well (see protocol at bottom of note).    CXR Findings:  No results found.    The findings from the last RT Protocol Assessment were as follows:   History Pulmonary Disease: Chronic pulmonary disease  Respiratory Pattern: Mild dyspnea at rest, irregular pattern, or RR 21-25 bpm  Breath Sounds: Inspiratory and expiratory or bilateral wheezing and/or rhonchi  Cough: Strong, productive  Indication for Bronchodilator Therapy: Decreased or absent breath sounds  Bronchodilator Assessment Score: 13    Aerosolized bronchodilator medication orders have been revised according to the RT Inhaler-Nebulizer Bronchodilator Protocol below.    Respiratory Therapist to perform RT Therapy Protocol Assessment initially then follow the protocol.  Repeat RT Therapy Protocol Assessment PRN for score 0-3 or on second treatment, BID, and PRN for scores above 3.    No Indications - adjust the frequency to every 6 hours PRN wheezing or bronchospasm, if no treatments needed after 48 hours then discontinue using Per Protocol order mode.     If indication present, adjust the RT bronchodilator orders based on the Bronchodilator Assessment Score as indicated below.  Use Inhaler orders unless patient has one or more of the following: on home nebulizer, not able to hold breath for 10 seconds, is not alert and oriented, cannot activate and use MDI correctly, or respiratory rate 25 breaths per minute or more, then use the equivalent nebulizer order(s) with same Frequency and PRN reasons based on the score.  If a patient is on this medication at home then do not decrease Frequency below that used at home.    0-3 - enter or revise RT bronchodilator order(s) to equivalent RT Bronchodilator order with Frequency

## 2025-02-24 ENCOUNTER — APPOINTMENT (OUTPATIENT)
Dept: GENERAL RADIOLOGY | Age: 60
DRG: 286 | End: 2025-02-24
Payer: COMMERCIAL

## 2025-02-24 ENCOUNTER — APPOINTMENT (OUTPATIENT)
Dept: VASCULAR LAB | Age: 60
DRG: 286 | End: 2025-02-24
Attending: STUDENT IN AN ORGANIZED HEALTH CARE EDUCATION/TRAINING PROGRAM
Payer: COMMERCIAL

## 2025-02-24 LAB
ALBUMIN SERPL-MCNC: 2.6 G/DL (ref 3.5–5.2)
ALBUMIN/GLOB SERPL: 0.7 {RATIO} (ref 1–2.5)
ALLEN TEST: POSITIVE
ALP SERPL-CCNC: 92 U/L (ref 40–129)
ALT SERPL-CCNC: 25 U/L (ref 5–41)
ANION GAP SERPL CALCULATED.3IONS-SCNC: 11 MMOL/L (ref 9–17)
AST SERPL-CCNC: 21 U/L
BASOPHILS # BLD: 0.1 K/UL (ref 0–0.2)
BASOPHILS NFR BLD: 0 % (ref 0–2)
BILIRUB SERPL-MCNC: 0.2 MG/DL (ref 0.3–1.2)
BNP SERPL-MCNC: 688 PG/ML
BUN SERPL-MCNC: 45 MG/DL (ref 6–20)
CALCIUM SERPL-MCNC: 8.3 MG/DL (ref 8.6–10.4)
CHLORIDE SERPL-SCNC: 96 MMOL/L (ref 98–107)
CO2 SERPL-SCNC: 26 MMOL/L (ref 20–31)
CREAT SERPL-MCNC: 1.7 MG/DL (ref 0.7–1.2)
EOSINOPHIL # BLD: 0 K/UL (ref 0–0.4)
EOSINOPHILS RELATIVE PERCENT: 0 % (ref 1–4)
ERYTHROCYTE [DISTWIDTH] IN BLOOD BY AUTOMATED COUNT: 12.6 % (ref 12.5–15.4)
FIO2: 70
GFR, ESTIMATED: 46 ML/MIN/1.73M2
GLUCOSE BLD-MCNC: 177 MG/DL (ref 75–110)
GLUCOSE BLD-MCNC: 198 MG/DL (ref 75–110)
GLUCOSE BLD-MCNC: 201 MG/DL (ref 75–110)
GLUCOSE BLD-MCNC: 218 MG/DL (ref 75–110)
GLUCOSE SERPL-MCNC: 254 MG/DL (ref 70–99)
HCT VFR BLD AUTO: 33.8 % (ref 41–53)
HGB BLD-MCNC: 11.3 G/DL (ref 13.5–17.5)
LYMPHOCYTES NFR BLD: 1 K/UL (ref 1–4.8)
LYMPHOCYTES RELATIVE PERCENT: 5 % (ref 24–44)
MCH RBC QN AUTO: 30.6 PG (ref 26–34)
MCHC RBC AUTO-ENTMCNC: 33.5 G/DL (ref 31–37)
MCV RBC AUTO: 91.3 FL (ref 80–100)
MONOCYTES NFR BLD: 1.3 K/UL (ref 0.1–1.2)
MONOCYTES NFR BLD: 6 % (ref 2–11)
MRSA, DNA, NASAL: NEGATIVE
NEUTROPHILS NFR BLD: 89 % (ref 36–66)
NEUTS SEG NFR BLD: 20 K/UL (ref 1.8–7.7)
O2 DELIVERY DEVICE: ABNORMAL
PLATELET # BLD AUTO: 245 K/UL (ref 140–450)
PMV BLD AUTO: 8.8 FL (ref 6–12)
POC HCO3: 30.1 MMOL/L (ref 21–28)
POC O2 SATURATION: 91.6 % (ref 94–98)
POC PCO2: 49 MM HG (ref 35–48)
POC PH: 7.4 (ref 7.35–7.45)
POC PO2: 63.8 MM HG (ref 83–108)
POSITIVE BASE EXCESS, ART: 4.2 MMOL/L (ref 0–3)
POTASSIUM SERPL-SCNC: 4.8 MMOL/L (ref 3.7–5.3)
PROT SERPL-MCNC: 6.4 G/DL (ref 6.4–8.3)
RBC # BLD AUTO: 3.69 M/UL (ref 4.5–5.9)
SAMPLE SITE: ABNORMAL
SODIUM SERPL-SCNC: 133 MMOL/L (ref 135–144)
SPECIMEN DESCRIPTION: NORMAL
VANCOMYCIN SERPL-MCNC: 20.8 UG/ML
WBC OTHER # BLD: 22.4 K/UL (ref 3.5–11)

## 2025-02-24 PROCEDURE — 2700000000 HC OXYGEN THERAPY PER DAY

## 2025-02-24 PROCEDURE — 94761 N-INVAS EAR/PLS OXIMETRY MLT: CPT

## 2025-02-24 PROCEDURE — 97116 GAIT TRAINING THERAPY: CPT

## 2025-02-24 PROCEDURE — 6370000000 HC RX 637 (ALT 250 FOR IP): Performed by: NURSE PRACTITIONER

## 2025-02-24 PROCEDURE — 6370000000 HC RX 637 (ALT 250 FOR IP): Performed by: INTERNAL MEDICINE

## 2025-02-24 PROCEDURE — 6370000000 HC RX 637 (ALT 250 FOR IP): Performed by: STUDENT IN AN ORGANIZED HEALTH CARE EDUCATION/TRAINING PROGRAM

## 2025-02-24 PROCEDURE — 87205 SMEAR GRAM STAIN: CPT

## 2025-02-24 PROCEDURE — 6360000002 HC RX W HCPCS: Performed by: INTERNAL MEDICINE

## 2025-02-24 PROCEDURE — 82803 BLOOD GASES ANY COMBINATION: CPT

## 2025-02-24 PROCEDURE — 86738 MYCOPLASMA ANTIBODY: CPT

## 2025-02-24 PROCEDURE — 99232 SBSQ HOSP IP/OBS MODERATE 35: CPT | Performed by: STUDENT IN AN ORGANIZED HEALTH CARE EDUCATION/TRAINING PROGRAM

## 2025-02-24 PROCEDURE — 97535 SELF CARE MNGMENT TRAINING: CPT

## 2025-02-24 PROCEDURE — 85025 COMPLETE CBC W/AUTO DIFF WBC: CPT

## 2025-02-24 PROCEDURE — 80202 ASSAY OF VANCOMYCIN: CPT

## 2025-02-24 PROCEDURE — 36600 WITHDRAWAL OF ARTERIAL BLOOD: CPT

## 2025-02-24 PROCEDURE — 71045 X-RAY EXAM CHEST 1 VIEW: CPT

## 2025-02-24 PROCEDURE — 93971 EXTREMITY STUDY: CPT

## 2025-02-24 PROCEDURE — 2580000003 HC RX 258: Performed by: INTERNAL MEDICINE

## 2025-02-24 PROCEDURE — 83880 ASSAY OF NATRIURETIC PEPTIDE: CPT

## 2025-02-24 PROCEDURE — 94660 CPAP INITIATION&MGMT: CPT

## 2025-02-24 PROCEDURE — 87070 CULTURE OTHR SPECIMN AEROBIC: CPT

## 2025-02-24 PROCEDURE — 2060000000 HC ICU INTERMEDIATE R&B

## 2025-02-24 PROCEDURE — 80053 COMPREHEN METABOLIC PANEL: CPT

## 2025-02-24 PROCEDURE — 36415 COLL VENOUS BLD VENIPUNCTURE: CPT

## 2025-02-24 PROCEDURE — 6360000002 HC RX W HCPCS: Performed by: STUDENT IN AN ORGANIZED HEALTH CARE EDUCATION/TRAINING PROGRAM

## 2025-02-24 PROCEDURE — 94640 AIRWAY INHALATION TREATMENT: CPT

## 2025-02-24 PROCEDURE — 82947 ASSAY GLUCOSE BLOOD QUANT: CPT

## 2025-02-24 PROCEDURE — 97166 OT EVAL MOD COMPLEX 45 MIN: CPT

## 2025-02-24 PROCEDURE — 97162 PT EVAL MOD COMPLEX 30 MIN: CPT

## 2025-02-24 PROCEDURE — 2500000003 HC RX 250 WO HCPCS: Performed by: STUDENT IN AN ORGANIZED HEALTH CARE EDUCATION/TRAINING PROGRAM

## 2025-02-24 RX ORDER — VANCOMYCIN 1.75 G/350ML
1250 INJECTION, SOLUTION INTRAVENOUS EVERY 24 HOURS
Status: DISCONTINUED | OUTPATIENT
Start: 2025-02-24 | End: 2025-02-24

## 2025-02-24 RX ORDER — HYDRALAZINE HYDROCHLORIDE 20 MG/ML
5 INJECTION INTRAMUSCULAR; INTRAVENOUS EVERY 8 HOURS PRN
Status: DISCONTINUED | OUTPATIENT
Start: 2025-02-24 | End: 2025-03-05 | Stop reason: HOSPADM

## 2025-02-24 RX ADMIN — SODIUM CHLORIDE, PRESERVATIVE FREE 10 ML: 5 INJECTION INTRAVENOUS at 18:55

## 2025-02-24 RX ADMIN — BUDESONIDE AND FORMOTEROL FUMARATE DIHYDRATE 2 PUFF: 160; 4.5 AEROSOL RESPIRATORY (INHALATION) at 20:07

## 2025-02-24 RX ADMIN — ASPIRIN 81 MG: 81 TABLET, CHEWABLE ORAL at 08:44

## 2025-02-24 RX ADMIN — INSULIN LISPRO 1 UNITS: 100 INJECTION, SOLUTION INTRAVENOUS; SUBCUTANEOUS at 08:45

## 2025-02-24 RX ADMIN — EMPAGLIFLOZIN 10 MG: 10 TABLET, FILM COATED ORAL at 08:44

## 2025-02-24 RX ADMIN — BUDESONIDE AND FORMOTEROL FUMARATE DIHYDRATE 2 PUFF: 160; 4.5 AEROSOL RESPIRATORY (INHALATION) at 07:53

## 2025-02-24 RX ADMIN — APIXABAN 10 MG: 5 TABLET, FILM COATED ORAL at 08:44

## 2025-02-24 RX ADMIN — HYDROMORPHONE HYDROCHLORIDE 1 MG: 1 INJECTION, SOLUTION INTRAMUSCULAR; INTRAVENOUS; SUBCUTANEOUS at 05:59

## 2025-02-24 RX ADMIN — HYDROMORPHONE HYDROCHLORIDE 1 MG: 1 INJECTION, SOLUTION INTRAMUSCULAR; INTRAVENOUS; SUBCUTANEOUS at 01:57

## 2025-02-24 RX ADMIN — APIXABAN 10 MG: 5 TABLET, FILM COATED ORAL at 20:30

## 2025-02-24 RX ADMIN — SODIUM CHLORIDE, PRESERVATIVE FREE 10 ML: 5 INJECTION INTRAVENOUS at 17:16

## 2025-02-24 RX ADMIN — HYDROMORPHONE HYDROCHLORIDE 1 MG: 1 INJECTION, SOLUTION INTRAMUSCULAR; INTRAVENOUS; SUBCUTANEOUS at 18:55

## 2025-02-24 RX ADMIN — FUROSEMIDE 40 MG: 10 INJECTION, SOLUTION INTRAMUSCULAR; INTRAVENOUS at 08:45

## 2025-02-24 RX ADMIN — SODIUM CHLORIDE, PRESERVATIVE FREE 10 ML: 5 INJECTION INTRAVENOUS at 11:36

## 2025-02-24 RX ADMIN — SODIUM CHLORIDE, PRESERVATIVE FREE 10 ML: 5 INJECTION INTRAVENOUS at 08:45

## 2025-02-24 RX ADMIN — CARVEDILOL 25 MG: 12.5 TABLET, FILM COATED ORAL at 08:43

## 2025-02-24 RX ADMIN — INSULIN LISPRO 1 UNITS: 100 INJECTION, SOLUTION INTRAVENOUS; SUBCUTANEOUS at 17:15

## 2025-02-24 RX ADMIN — TRAMADOL HYDROCHLORIDE 25 MG: 50 TABLET, COATED ORAL at 21:42

## 2025-02-24 RX ADMIN — GUAIFENESIN 1200 MG: 600 TABLET, EXTENDED RELEASE ORAL at 08:44

## 2025-02-24 RX ADMIN — TRAMADOL HYDROCHLORIDE 25 MG: 50 TABLET, COATED ORAL at 08:43

## 2025-02-24 RX ADMIN — ONDANSETRON 4 MG: 4 TABLET, ORALLY DISINTEGRATING ORAL at 11:49

## 2025-02-24 RX ADMIN — IPRATROPIUM BROMIDE AND ALBUTEROL SULFATE 1 DOSE: 2.5; .5 SOLUTION RESPIRATORY (INHALATION) at 15:24

## 2025-02-24 RX ADMIN — IPRATROPIUM BROMIDE AND ALBUTEROL SULFATE 1 DOSE: 2.5; .5 SOLUTION RESPIRATORY (INHALATION) at 07:52

## 2025-02-24 RX ADMIN — SODIUM CHLORIDE, PRESERVATIVE FREE 10 ML: 5 INJECTION INTRAVENOUS at 20:30

## 2025-02-24 RX ADMIN — ATORVASTATIN CALCIUM 20 MG: 20 TABLET, FILM COATED ORAL at 08:43

## 2025-02-24 RX ADMIN — CEFEPIME 2000 MG: 2 INJECTION, POWDER, FOR SOLUTION INTRAVENOUS at 10:10

## 2025-02-24 RX ADMIN — FUROSEMIDE 40 MG: 10 INJECTION, SOLUTION INTRAMUSCULAR; INTRAVENOUS at 17:15

## 2025-02-24 RX ADMIN — HYDROMORPHONE HYDROCHLORIDE 1 MG: 1 INJECTION, SOLUTION INTRAMUSCULAR; INTRAVENOUS; SUBCUTANEOUS at 11:36

## 2025-02-24 RX ADMIN — TRAMADOL HYDROCHLORIDE 25 MG: 50 TABLET, COATED ORAL at 17:22

## 2025-02-24 RX ADMIN — HYDROMORPHONE HYDROCHLORIDE 1 MG: 1 INJECTION, SOLUTION INTRAMUSCULAR; INTRAVENOUS; SUBCUTANEOUS at 22:53

## 2025-02-24 RX ADMIN — IPRATROPIUM BROMIDE AND ALBUTEROL SULFATE 1 DOSE: 2.5; .5 SOLUTION RESPIRATORY (INHALATION) at 20:06

## 2025-02-24 RX ADMIN — CARVEDILOL 25 MG: 12.5 TABLET, FILM COATED ORAL at 17:15

## 2025-02-24 RX ADMIN — SPIRONOLACTONE 25 MG: 25 TABLET, FILM COATED ORAL at 08:44

## 2025-02-24 RX ADMIN — CEFEPIME 2000 MG: 2 INJECTION, POWDER, FOR SOLUTION INTRAVENOUS at 21:44

## 2025-02-24 RX ADMIN — IPRATROPIUM BROMIDE AND ALBUTEROL SULFATE 1 DOSE: 2.5; .5 SOLUTION RESPIRATORY (INHALATION) at 11:30

## 2025-02-24 RX ADMIN — GUAIFENESIN 1200 MG: 600 TABLET, EXTENDED RELEASE ORAL at 20:30

## 2025-02-24 RX ADMIN — TRAMADOL HYDROCHLORIDE 25 MG: 50 TABLET, COATED ORAL at 13:33

## 2025-02-24 RX ADMIN — TRAMADOL HYDROCHLORIDE 25 MG: 50 TABLET, COATED ORAL at 01:32

## 2025-02-24 ASSESSMENT — PAIN DESCRIPTION - ORIENTATION
ORIENTATION: RIGHT

## 2025-02-24 ASSESSMENT — PAIN DESCRIPTION - PAIN TYPE: TYPE: ACUTE PAIN

## 2025-02-24 ASSESSMENT — PAIN SCALES - GENERAL
PAINLEVEL_OUTOF10: 7
PAINLEVEL_OUTOF10: 2
PAINLEVEL_OUTOF10: 7
PAINLEVEL_OUTOF10: 7
PAINLEVEL_OUTOF10: 5
PAINLEVEL_OUTOF10: 7
PAINLEVEL_OUTOF10: 5
PAINLEVEL_OUTOF10: 7
PAINLEVEL_OUTOF10: 0
PAINLEVEL_OUTOF10: 5
PAINLEVEL_OUTOF10: 8
PAINLEVEL_OUTOF10: 7
PAINLEVEL_OUTOF10: 2
PAINLEVEL_OUTOF10: 4
PAINLEVEL_OUTOF10: 5
PAINLEVEL_OUTOF10: 9
PAINLEVEL_OUTOF10: 6
PAINLEVEL_OUTOF10: 7

## 2025-02-24 ASSESSMENT — PAIN DESCRIPTION - DESCRIPTORS
DESCRIPTORS: ACHING

## 2025-02-24 ASSESSMENT — PAIN SCALES - WONG BAKER
WONGBAKER_NUMERICALRESPONSE: NO HURT
WONGBAKER_NUMERICALRESPONSE: NO HURT

## 2025-02-24 ASSESSMENT — PAIN DESCRIPTION - LOCATION
LOCATION: RIB CAGE
LOCATION: RIB CAGE
LOCATION: ABDOMEN;CHEST
LOCATION: RIB CAGE
LOCATION: CHEST

## 2025-02-24 ASSESSMENT — PAIN - FUNCTIONAL ASSESSMENT: PAIN_FUNCTIONAL_ASSESSMENT: PREVENTS OR INTERFERES SOME ACTIVE ACTIVITIES AND ADLS

## 2025-02-24 NOTE — CARE COORDINATION
Case Management Assessment  Initial Evaluation    Date/Time of Evaluation: 2/24/2025 1:35 PM  Assessment Completed by: Rekha Joel    If patient is discharged prior to next notation, then this note serves as note for discharge by case management.    Patient Name: Kiel Day                   YOB: 1965  Diagnosis: Hypoxemia [R09.02]  Influenza A [J10.1]  Acute hypoxic respiratory failure (HCC) [J96.01]                   Date / Time: 2/22/2025  6:15 PM    Patient Admission Status: Inpatient   Readmission Risk (Low < 19, Mod (19-27), High > 27): Readmission Risk Score: 19.7    Current PCP: Angelita Walters MD  PCP verified by CM? Yes    Chart Reviewed: Yes      History Provided by: Patient  Patient Orientation: Alert and Oriented    Patient Cognition: Alert    Hospitalization in the last 30 days (Readmission):  Yes    If yes, Readmission Assessment in CM Navigator will be completed.    Advance Directives:      Code Status: Full Code   Patient's Primary Decision Maker is: Legal Next of Kin (domestic partner, Laura)      Discharge Planning:    Patient lives with: Spouse/Significant Other Type of Home: Trailer/Mobile Home  Primary Care Giver: Self  Patient Support Systems include: Spouse/Significant Other   Current Financial resources: Other (Comment) (commercial)  Current community resources: None  Current services prior to admission: Durable Medical Equipment (uses oxygen  at 2L nc from [employer], his glucometer is broken and he needs a new one)            Current DME: Oxygen Therapy (Comment)            Type of Home Care services:  None    ADLS  Prior functional level: Independent in ADLs/IADLs  Current functional level: Independent in ADLs/IADLs    PT AM-PAC: 19 /24  OT AM-PAC: 24 /24    Family can provide assistance at DC: Yes  Would you like Case Management to discuss the discharge plan with any other family members/significant others, and if so, who? No  Plans to Return to Present Housing:

## 2025-02-24 NOTE — CARE COORDINATION
I checked with the inpatient and outpatient pharmacy here and they do not have any programs to get patients free glucometers.  I perfect served Dr. Cary to see if he could write a prescription for one and perhaps the patient could submit it to his insurance for a new one.

## 2025-02-25 PROBLEM — R09.02 HYPOXEMIA: Status: ACTIVE | Noted: 2025-02-25

## 2025-02-25 LAB
ALBUMIN SERPL-MCNC: 2.8 G/DL (ref 3.5–5.2)
ALBUMIN/GLOB SERPL: 0.7 {RATIO} (ref 1–2.5)
ALP SERPL-CCNC: 95 U/L (ref 40–129)
ALT SERPL-CCNC: 37 U/L (ref 5–41)
ANION GAP SERPL CALCULATED.3IONS-SCNC: 10 MMOL/L (ref 9–17)
ANTI-XA UNFRAC HEPARIN: >1.1 IU/L (ref 0.3–0.7)
ANTI-XA UNFRAC HEPARIN: >1.1 IU/L (ref 0.3–0.7)
AST SERPL-CCNC: 21 U/L
BASOPHILS # BLD: 0 K/UL (ref 0–0.2)
BASOPHILS NFR BLD: 0 % (ref 0–2)
BILIRUB SERPL-MCNC: 0.3 MG/DL (ref 0.3–1.2)
BUN SERPL-MCNC: 38 MG/DL (ref 6–20)
CALCIUM SERPL-MCNC: 9 MG/DL (ref 8.6–10.4)
CHLORIDE SERPL-SCNC: 96 MMOL/L (ref 98–107)
CO2 SERPL-SCNC: 29 MMOL/L (ref 20–31)
CREAT SERPL-MCNC: 1.5 MG/DL (ref 0.7–1.2)
ECHO BSA: 2.21 M2
EKG ATRIAL RATE: 75 BPM
EKG P AXIS: 70 DEGREES
EKG P-R INTERVAL: 150 MS
EKG Q-T INTERVAL: 392 MS
EKG QRS DURATION: 112 MS
EKG QTC CALCULATION (BAZETT): 437 MS
EKG R AXIS: -54 DEGREES
EKG T AXIS: 36 DEGREES
EKG VENTRICULAR RATE: 75 BPM
EOSINOPHIL # BLD: 0 K/UL (ref 0–0.4)
EOSINOPHILS RELATIVE PERCENT: 0 % (ref 1–4)
ERYTHROCYTE [DISTWIDTH] IN BLOOD BY AUTOMATED COUNT: 12.4 % (ref 12.5–15.4)
ERYTHROCYTE [DISTWIDTH] IN BLOOD BY AUTOMATED COUNT: 12.6 % (ref 12.5–15.4)
GFR, ESTIMATED: 53 ML/MIN/1.73M2
GLUCOSE BLD-MCNC: 176 MG/DL (ref 75–110)
GLUCOSE BLD-MCNC: 243 MG/DL (ref 75–110)
GLUCOSE BLD-MCNC: 246 MG/DL (ref 75–110)
GLUCOSE BLD-MCNC: 249 MG/DL (ref 75–110)
GLUCOSE BLD-MCNC: 255 MG/DL (ref 75–110)
GLUCOSE SERPL-MCNC: 184 MG/DL (ref 70–99)
HCT VFR BLD AUTO: 35.3 % (ref 41–53)
HCT VFR BLD AUTO: 35.4 % (ref 41–53)
HGB BLD-MCNC: 12 G/DL (ref 13.5–17.5)
HGB BLD-MCNC: 12.1 G/DL (ref 13.5–17.5)
INR PPP: 1.7 (ref 0.9–1.2)
LYMPHOCYTES NFR BLD: 0.9 K/UL (ref 1–4.8)
LYMPHOCYTES RELATIVE PERCENT: 4 % (ref 24–44)
MCH RBC QN AUTO: 31 PG (ref 26–34)
MCH RBC QN AUTO: 31.4 PG (ref 26–34)
MCHC RBC AUTO-ENTMCNC: 34.1 G/DL (ref 31–37)
MCHC RBC AUTO-ENTMCNC: 34.3 G/DL (ref 31–37)
MCV RBC AUTO: 90.8 FL (ref 80–100)
MCV RBC AUTO: 91.5 FL (ref 80–100)
MONOCYTES NFR BLD: 1.4 K/UL (ref 0.1–1.2)
MONOCYTES NFR BLD: 7 % (ref 2–11)
NEUTROPHILS NFR BLD: 89 % (ref 36–66)
NEUTS SEG NFR BLD: 18.6 K/UL (ref 1.8–7.7)
PARTIAL THROMBOPLASTIN TIME: 37.1 SEC (ref 24–36)
PARTIAL THROMBOPLASTIN TIME: 49.4 SEC (ref 24–36)
PLATELET # BLD AUTO: 268 K/UL (ref 140–450)
PLATELET # BLD AUTO: 268 K/UL (ref 140–450)
PMV BLD AUTO: 8.7 FL (ref 6–12)
PMV BLD AUTO: 8.9 FL (ref 6–12)
POTASSIUM SERPL-SCNC: 4.7 MMOL/L (ref 3.7–5.3)
PROT SERPL-MCNC: 7 G/DL (ref 6.4–8.3)
PROTHROMBIN TIME: 19.4 SEC (ref 11.8–14.6)
RBC # BLD AUTO: 3.87 M/UL (ref 4.5–5.9)
RBC # BLD AUTO: 3.89 M/UL (ref 4.5–5.9)
SODIUM SERPL-SCNC: 135 MMOL/L (ref 135–144)
WBC OTHER # BLD: 19.5 K/UL (ref 3.5–11)
WBC OTHER # BLD: 20.9 K/UL (ref 3.5–11)

## 2025-02-25 PROCEDURE — 6370000000 HC RX 637 (ALT 250 FOR IP): Performed by: STUDENT IN AN ORGANIZED HEALTH CARE EDUCATION/TRAINING PROGRAM

## 2025-02-25 PROCEDURE — 85520 HEPARIN ASSAY: CPT

## 2025-02-25 PROCEDURE — 82947 ASSAY GLUCOSE BLOOD QUANT: CPT

## 2025-02-25 PROCEDURE — 6370000000 HC RX 637 (ALT 250 FOR IP): Performed by: INTERNAL MEDICINE

## 2025-02-25 PROCEDURE — 6360000002 HC RX W HCPCS: Performed by: STUDENT IN AN ORGANIZED HEALTH CARE EDUCATION/TRAINING PROGRAM

## 2025-02-25 PROCEDURE — 6360000002 HC RX W HCPCS: Performed by: INTERNAL MEDICINE

## 2025-02-25 PROCEDURE — 94640 AIRWAY INHALATION TREATMENT: CPT

## 2025-02-25 PROCEDURE — 6370000000 HC RX 637 (ALT 250 FOR IP): Performed by: HOSPITALIST

## 2025-02-25 PROCEDURE — 97116 GAIT TRAINING THERAPY: CPT

## 2025-02-25 PROCEDURE — 85730 THROMBOPLASTIN TIME PARTIAL: CPT

## 2025-02-25 PROCEDURE — 93971 EXTREMITY STUDY: CPT | Performed by: SURGERY

## 2025-02-25 PROCEDURE — 80053 COMPREHEN METABOLIC PANEL: CPT

## 2025-02-25 PROCEDURE — 2700000000 HC OXYGEN THERAPY PER DAY

## 2025-02-25 PROCEDURE — 6370000000 HC RX 637 (ALT 250 FOR IP): Performed by: NURSE PRACTITIONER

## 2025-02-25 PROCEDURE — 6360000002 HC RX W HCPCS: Performed by: HOSPITALIST

## 2025-02-25 PROCEDURE — 94761 N-INVAS EAR/PLS OXIMETRY MLT: CPT

## 2025-02-25 PROCEDURE — 2580000003 HC RX 258: Performed by: HOSPITALIST

## 2025-02-25 PROCEDURE — 85025 COMPLETE CBC W/AUTO DIFF WBC: CPT

## 2025-02-25 PROCEDURE — 94660 CPAP INITIATION&MGMT: CPT

## 2025-02-25 PROCEDURE — 85027 COMPLETE CBC AUTOMATED: CPT

## 2025-02-25 PROCEDURE — 2580000003 HC RX 258: Performed by: INTERNAL MEDICINE

## 2025-02-25 PROCEDURE — 2500000003 HC RX 250 WO HCPCS: Performed by: STUDENT IN AN ORGANIZED HEALTH CARE EDUCATION/TRAINING PROGRAM

## 2025-02-25 PROCEDURE — 99223 1ST HOSP IP/OBS HIGH 75: CPT | Performed by: INTERNAL MEDICINE

## 2025-02-25 PROCEDURE — 2060000000 HC ICU INTERMEDIATE R&B

## 2025-02-25 PROCEDURE — 93010 ELECTROCARDIOGRAM REPORT: CPT | Performed by: INTERNAL MEDICINE

## 2025-02-25 PROCEDURE — 85610 PROTHROMBIN TIME: CPT

## 2025-02-25 PROCEDURE — 36415 COLL VENOUS BLD VENIPUNCTURE: CPT

## 2025-02-25 PROCEDURE — 99232 SBSQ HOSP IP/OBS MODERATE 35: CPT | Performed by: HOSPITALIST

## 2025-02-25 RX ORDER — HEPARIN SODIUM 1000 [USP'U]/ML
80 INJECTION, SOLUTION INTRAVENOUS; SUBCUTANEOUS ONCE
Status: COMPLETED | OUTPATIENT
Start: 2025-02-25 | End: 2025-02-25

## 2025-02-25 RX ORDER — HEPARIN SODIUM 1000 [USP'U]/ML
40 INJECTION, SOLUTION INTRAVENOUS; SUBCUTANEOUS PRN
Status: DISCONTINUED | OUTPATIENT
Start: 2025-02-25 | End: 2025-02-25

## 2025-02-25 RX ORDER — ACETYLCYSTEINE 200 MG/ML
200 SOLUTION ORAL; RESPIRATORY (INHALATION)
Status: DISCONTINUED | OUTPATIENT
Start: 2025-02-25 | End: 2025-03-02

## 2025-02-25 RX ORDER — HEPARIN SODIUM 1000 [USP'U]/ML
80 INJECTION, SOLUTION INTRAVENOUS; SUBCUTANEOUS PRN
Status: DISCONTINUED | OUTPATIENT
Start: 2025-02-25 | End: 2025-03-03

## 2025-02-25 RX ORDER — INSULIN LISPRO 100 [IU]/ML
2 INJECTION, SOLUTION INTRAVENOUS; SUBCUTANEOUS ONCE
Status: COMPLETED | OUTPATIENT
Start: 2025-02-25 | End: 2025-02-25

## 2025-02-25 RX ORDER — HEPARIN SODIUM 10000 [USP'U]/100ML
5-30 INJECTION, SOLUTION INTRAVENOUS CONTINUOUS
Status: DISCONTINUED | OUTPATIENT
Start: 2025-02-25 | End: 2025-03-03

## 2025-02-25 RX ORDER — HEPARIN SODIUM 1000 [USP'U]/ML
40 INJECTION, SOLUTION INTRAVENOUS; SUBCUTANEOUS PRN
Status: DISCONTINUED | OUTPATIENT
Start: 2025-02-25 | End: 2025-03-03

## 2025-02-25 RX ORDER — INSULIN LISPRO 100 [IU]/ML
0-8 INJECTION, SOLUTION INTRAVENOUS; SUBCUTANEOUS
Status: DISCONTINUED | OUTPATIENT
Start: 2025-02-25 | End: 2025-02-26

## 2025-02-25 RX ORDER — HEPARIN SODIUM 1000 [USP'U]/ML
80 INJECTION, SOLUTION INTRAVENOUS; SUBCUTANEOUS ONCE
Status: DISCONTINUED | OUTPATIENT
Start: 2025-02-25 | End: 2025-02-25

## 2025-02-25 RX ORDER — HEPARIN SODIUM 10000 [USP'U]/100ML
5-30 INJECTION, SOLUTION INTRAVENOUS CONTINUOUS
Status: DISCONTINUED | OUTPATIENT
Start: 2025-02-25 | End: 2025-02-25

## 2025-02-25 RX ORDER — HEPARIN SODIUM 1000 [USP'U]/ML
80 INJECTION, SOLUTION INTRAVENOUS; SUBCUTANEOUS PRN
Status: DISCONTINUED | OUTPATIENT
Start: 2025-02-25 | End: 2025-02-25

## 2025-02-25 RX ORDER — ALBUTEROL SULFATE 0.83 MG/ML
2.5 SOLUTION RESPIRATORY (INHALATION)
Status: DISCONTINUED | OUTPATIENT
Start: 2025-02-25 | End: 2025-03-02

## 2025-02-25 RX ADMIN — IPRATROPIUM BROMIDE AND ALBUTEROL SULFATE 1 DOSE: .5; 2.5 SOLUTION RESPIRATORY (INHALATION) at 03:35

## 2025-02-25 RX ADMIN — CARVEDILOL 25 MG: 12.5 TABLET, FILM COATED ORAL at 17:21

## 2025-02-25 RX ADMIN — SPIRONOLACTONE 25 MG: 25 TABLET, FILM COATED ORAL at 08:11

## 2025-02-25 RX ADMIN — INSULIN LISPRO 2 UNITS: 100 INJECTION, SOLUTION INTRAVENOUS; SUBCUTANEOUS at 21:09

## 2025-02-25 RX ADMIN — IPRATROPIUM BROMIDE AND ALBUTEROL SULFATE 1 DOSE: .5; 2.5 SOLUTION RESPIRATORY (INHALATION) at 00:20

## 2025-02-25 RX ADMIN — TRAMADOL HYDROCHLORIDE 25 MG: 50 TABLET, COATED ORAL at 12:57

## 2025-02-25 RX ADMIN — FUROSEMIDE 40 MG: 10 INJECTION, SOLUTION INTRAMUSCULAR; INTRAVENOUS at 17:21

## 2025-02-25 RX ADMIN — IPRATROPIUM BROMIDE AND ALBUTEROL SULFATE 1 DOSE: 2.5; .5 SOLUTION RESPIRATORY (INHALATION) at 08:03

## 2025-02-25 RX ADMIN — BUDESONIDE AND FORMOTEROL FUMARATE DIHYDRATE 2 PUFF: 160; 4.5 AEROSOL RESPIRATORY (INHALATION) at 08:03

## 2025-02-25 RX ADMIN — INSULIN LISPRO 2 UNITS: 100 INJECTION, SOLUTION INTRAVENOUS; SUBCUTANEOUS at 17:20

## 2025-02-25 RX ADMIN — IPRATROPIUM BROMIDE AND ALBUTEROL SULFATE 1 DOSE: 2.5; .5 SOLUTION RESPIRATORY (INHALATION) at 15:35

## 2025-02-25 RX ADMIN — GUAIFENESIN 1200 MG: 600 TABLET, EXTENDED RELEASE ORAL at 08:10

## 2025-02-25 RX ADMIN — CARVEDILOL 25 MG: 12.5 TABLET, FILM COATED ORAL at 08:10

## 2025-02-25 RX ADMIN — ALBUTEROL SULFATE 2.5 MG: 2.5 SOLUTION RESPIRATORY (INHALATION) at 22:00

## 2025-02-25 RX ADMIN — HYDROMORPHONE HYDROCHLORIDE 1 MG: 1 INJECTION, SOLUTION INTRAMUSCULAR; INTRAVENOUS; SUBCUTANEOUS at 19:35

## 2025-02-25 RX ADMIN — SODIUM CHLORIDE, PRESERVATIVE FREE 10 ML: 5 INJECTION INTRAVENOUS at 08:12

## 2025-02-25 RX ADMIN — CEFEPIME 2000 MG: 2 INJECTION, POWDER, FOR SOLUTION INTRAVENOUS at 21:11

## 2025-02-25 RX ADMIN — FUROSEMIDE 40 MG: 10 INJECTION, SOLUTION INTRAMUSCULAR; INTRAVENOUS at 08:11

## 2025-02-25 RX ADMIN — GUAIFENESIN 1200 MG: 600 TABLET, EXTENDED RELEASE ORAL at 21:08

## 2025-02-25 RX ADMIN — ASPIRIN 81 MG: 81 TABLET, CHEWABLE ORAL at 08:10

## 2025-02-25 RX ADMIN — HYDROMORPHONE HYDROCHLORIDE 1 MG: 1 INJECTION, SOLUTION INTRAMUSCULAR; INTRAVENOUS; SUBCUTANEOUS at 13:51

## 2025-02-25 RX ADMIN — IPRATROPIUM BROMIDE AND ALBUTEROL SULFATE 1 DOSE: 2.5; .5 SOLUTION RESPIRATORY (INHALATION) at 12:43

## 2025-02-25 RX ADMIN — HEPARIN SODIUM 7800 UNITS: 1000 INJECTION INTRAVENOUS; SUBCUTANEOUS at 13:18

## 2025-02-25 RX ADMIN — INSULIN LISPRO 2 UNITS: 100 INJECTION, SOLUTION INTRAVENOUS; SUBCUTANEOUS at 13:15

## 2025-02-25 RX ADMIN — TRAMADOL HYDROCHLORIDE 25 MG: 50 TABLET, COATED ORAL at 17:34

## 2025-02-25 RX ADMIN — TRAMADOL HYDROCHLORIDE 25 MG: 50 TABLET, COATED ORAL at 08:11

## 2025-02-25 RX ADMIN — SODIUM CHLORIDE, PRESERVATIVE FREE 10 ML: 5 INJECTION INTRAVENOUS at 13:52

## 2025-02-25 RX ADMIN — SODIUM CHLORIDE, PRESERVATIVE FREE 10 ML: 5 INJECTION INTRAVENOUS at 17:23

## 2025-02-25 RX ADMIN — EMPAGLIFLOZIN 10 MG: 10 TABLET, FILM COATED ORAL at 08:11

## 2025-02-25 RX ADMIN — CEFEPIME 2000 MG: 2 INJECTION, POWDER, FOR SOLUTION INTRAVENOUS at 10:29

## 2025-02-25 RX ADMIN — BUDESONIDE AND FORMOTEROL FUMARATE DIHYDRATE 2 PUFF: 160; 4.5 AEROSOL RESPIRATORY (INHALATION) at 22:00

## 2025-02-25 RX ADMIN — TRAMADOL HYDROCHLORIDE 25 MG: 50 TABLET, COATED ORAL at 21:15

## 2025-02-25 RX ADMIN — ACETYLCYSTEINE 200 MG: 200 SOLUTION ORAL; RESPIRATORY (INHALATION) at 22:00

## 2025-02-25 RX ADMIN — APIXABAN 10 MG: 5 TABLET, FILM COATED ORAL at 08:11

## 2025-02-25 RX ADMIN — HEPARIN SODIUM 18 UNITS/KG/HR: 10000 INJECTION, SOLUTION INTRAVENOUS at 13:18

## 2025-02-25 RX ADMIN — SODIUM CHLORIDE, PRESERVATIVE FREE 10 ML: 5 INJECTION INTRAVENOUS at 21:09

## 2025-02-25 RX ADMIN — ATORVASTATIN CALCIUM 20 MG: 20 TABLET, FILM COATED ORAL at 08:10

## 2025-02-25 ASSESSMENT — PAIN SCALES - GENERAL
PAINLEVEL_OUTOF10: 4
PAINLEVEL_OUTOF10: 5
PAINLEVEL_OUTOF10: 2
PAINLEVEL_OUTOF10: 5
PAINLEVEL_OUTOF10: 1
PAINLEVEL_OUTOF10: 8
PAINLEVEL_OUTOF10: 5
PAINLEVEL_OUTOF10: 7
PAINLEVEL_OUTOF10: 8
PAINLEVEL_OUTOF10: 5
PAINLEVEL_OUTOF10: 6
PAINLEVEL_OUTOF10: 5
PAINLEVEL_OUTOF10: 7
PAINLEVEL_OUTOF10: 8
PAINLEVEL_OUTOF10: 5
PAINLEVEL_OUTOF10: 8

## 2025-02-25 ASSESSMENT — PAIN DESCRIPTION - DESCRIPTORS
DESCRIPTORS: ACHING

## 2025-02-25 ASSESSMENT — PAIN - FUNCTIONAL ASSESSMENT
PAIN_FUNCTIONAL_ASSESSMENT: PREVENTS OR INTERFERES SOME ACTIVE ACTIVITIES AND ADLS

## 2025-02-25 ASSESSMENT — PAIN DESCRIPTION - ORIENTATION
ORIENTATION: RIGHT

## 2025-02-25 ASSESSMENT — PAIN DESCRIPTION - LOCATION
LOCATION: CHEST;RIB CAGE
LOCATION: CHEST
LOCATION: CHEST;RIB CAGE

## 2025-02-25 ASSESSMENT — PAIN DESCRIPTION - PAIN TYPE: TYPE: ACUTE PAIN

## 2025-02-25 NOTE — RT PROTOCOL NOTE
RT Inhaler-Nebulizer Bronchodilator Protocol Note    There is a bronchodilator order in the chart from a provider indicating to follow the RT Bronchodilator Protocol and there is an “Initiate RT Inhaler-Nebulizer Bronchodilator Protocol” order as well (see protocol at bottom of note).    CXR Findings:  XR CHEST PORTABLE    Result Date: 2/24/2025  Nonspecific lower lobe airspace opacities, right greater than left       The findings from the last RT Protocol Assessment were as follows:   History Pulmonary Disease: Chronic pulmonary disease  Respiratory Pattern: Mild dyspnea at rest, irregular pattern, or RR 21-25 bpm  Breath Sounds: Inspiratory and expiratory or bilateral wheezing and/or rhonchi  Cough: Strong, productive  Indication for Bronchodilator Therapy: Decreased or absent breath sounds  Bronchodilator Assessment Score: 13    Aerosolized bronchodilator medication orders have been revised according to the RT Inhaler-Nebulizer Bronchodilator Protocol below.    Respiratory Therapist to perform RT Therapy Protocol Assessment initially then follow the protocol.  Repeat RT Therapy Protocol Assessment PRN for score 0-3 or on second treatment, BID, and PRN for scores above 3.    No Indications - adjust the frequency to every 6 hours PRN wheezing or bronchospasm, if no treatments needed after 48 hours then discontinue using Per Protocol order mode.     If indication present, adjust the RT bronchodilator orders based on the Bronchodilator Assessment Score as indicated below.  Use Inhaler orders unless patient has one or more of the following: on home nebulizer, not able to hold breath for 10 seconds, is not alert and oriented, cannot activate and use MDI correctly, or respiratory rate 25 breaths per minute or more, then use the equivalent nebulizer order(s) with same Frequency and PRN reasons based on the score.  If a patient is on this medication at home then do not decrease Frequency below that used at home.    0-3 -

## 2025-02-25 NOTE — CONSULTS
CARDIOLOGY CONSULTATION                                                 Jake Robert MD [facility]                                                           Velma Jenkins MD [facility]                                                                    Mariana Bunch, CNP                                                         Date:   2/25/2025  Patient name: Kiel Day  Date of admission:  2/22/2025  6:15 PM  MRN:   [id number]  YOB: 1965    Reason for Admission: Acute hypoxic respiratory failure    Chief Complaint: Worsening edema    History of present illness:     59-year-old male with recent admission with hypoxic respiratory failure secondary to influenza a, was evaluated for mildly elevated troponins, denied any anginal pain at that time, echocardiogram showed preserved LV systolic function, cardiac catheterization was recommended as an outpatient, now readmitted with worsening dyspnea and bilateral lower extremity edema, WBC significantly elevated, started on broad-spectrum antibiotics along with NIPPV and high flow oxygen, also started on IV Lasix twice daily, creatinine is 1.5 which is pretty much at his baseline.    Past Medical History:   has a past medical history of Asthma, COPD (chronic obstructive pulmonary disease) (HCC), Diabetes mellitus (HCC), Hyperlipidemia, and Hypertension.    Past Surgical History:   has a past surgical history that includes Cholecystectomy; Foot surgery (Right); Wrist surgery (Right); Elbow surgery (Bilateral); Mandible surgery; Clavicle surgery (Left); and Eye surgery (Right, 5/20/2024).     Home Medications:    Prior to Admission medications    Medication Sig Start Date End Date Taking? Authorizing Provider   amLODIPine (NORVASC) 10 MG tablet Take 1 tablet by mouth daily 2/18/25   Anurag Cary MD   carvedilol (COREG) 25 MG tablet Take 1 tablet by mouth 2 times 
clearance  Agree with Portia  Wean O2 to keep sats greater than 88%  Needs aggressive diuresis, currently on 20 mg twice daily, did receive 20 mg last evening, and did receive a 40 mg dose already today  Strict I's/O  Daily weight  Encourage smoking cessation  DuoNebs every 4 hours while awake  Continue with symbicort/spiriva - unable to afford Trelegy and has no inhaled medications currently at home  No active wheezing, will hold off on steroids  Closely monitor renal function  Reviewed CT images and prior hospitalization records in detail  Reviewed to [facility] clinic records, unable to find any PFT  Cxr in am  Eventually will need PFT  Continue to closely monitor, high risk for further deterioration, may need BiPAP minimally and alternate with high flow nasal cannula  Discussed with Dr. Cary  Updated discussed with Dr. Otero  D/w RN & RRT    CCT>30 min    Thank you very much we will continue to follow along closely with you  Please call if any further decline in his respiratory status    Addendum: reviewed abg w Dr Otero  Acute hypercap resp failure, ARDS picture  Will initiate NIV therapy, can give breaks for meals today  Repeat abg in 2 hours on NIV and in am  D/w RRT      [facility]    Electronically signed by ARTHUR Sy - CNP on 02/23/25

## 2025-02-25 NOTE — CARE COORDINATION
Patient with no new discharge needs. May need new order to [facility], current with them for 2L nc.  Plans to go home with his domestic partner to assist and transport. Pt. States he may be having a cath this visit.    1339-Patient given an order for a glucometer to take to a Medical equipment company to try and obtain a new glucometer through his insurance.

## 2025-02-25 NOTE — ACP (ADVANCE CARE PLANNING)
Advance Care Planning     Advance Care Planning Inpatient Note  [facility]    Today's Date: 2/25/2025  Unit: [facility]    Received request from patient.  Upon review of chart and communication with care team, patient's decision making abilities are not in question.. Patient and Healthcare Decision Maker was/were present in the room during visit.    Goals of ACP Conversation:  Discuss advance care planning documents    Health Care Decision Makers:       Primary Decision Maker: Laura Cobian - Domestic Partner - [phone]    Secondary Decision Maker: Eliz Cobian - Step Child - [phone]  Summary:  Completed New Documents    Advance Care Planning Documents (Patient Wishes):  Healthcare Power of /Advance Directive Appointment of Health Care Agent     Assessment:  Patient was joined by his domestic partner, Laura, in the room. Pt's Domestic Partner requested assistance with completing Pt's health care power of  document. Pt and DP were educated about the [place] next of kin hierarchy. Pt and DP shared that Pt and she have discussed Pt's health care wishes. Pt did not communicate much due to reporting being \"in pain.\" Pt affirmed that he draws strength from his bob in God and prayer.     Interventions:  Discussed and provided education on state decision maker hierarchy  Assisted in the completion of documents according to patient's wishes at this time    Care Preferences Communicated:   No    Outcomes/Plan:  ACP Discussion: Completed    Electronically signed by MAHNAZ Sotelo on 2/25/2025 at 3:56 PM

## 2025-02-26 LAB
ALBUMIN SERPL-MCNC: 2.7 G/DL (ref 3.5–5.2)
ALBUMIN/GLOB SERPL: 0.7 {RATIO} (ref 1–2.5)
ALP SERPL-CCNC: 99 U/L (ref 40–129)
ALT SERPL-CCNC: 32 U/L (ref 5–41)
ANION GAP SERPL CALCULATED.3IONS-SCNC: 10 MMOL/L (ref 9–17)
ANTI-XA UNFRAC HEPARIN: 0.55 IU/L (ref 0.3–0.7)
ANTI-XA UNFRAC HEPARIN: 0.68 IU/L (ref 0.3–0.7)
ANTI-XA UNFRAC HEPARIN: 0.9 IU/L (ref 0.3–0.7)
ANTI-XA UNFRAC HEPARIN: >1.1 IU/L (ref 0.3–0.7)
AST SERPL-CCNC: 17 U/L
BASOPHILS # BLD: 0.01 K/UL (ref 0–0.2)
BASOPHILS NFR BLD: 0 % (ref 0–2)
BILIRUB SERPL-MCNC: 0.3 MG/DL (ref 0.3–1.2)
BUN SERPL-MCNC: 39 MG/DL (ref 6–20)
CALCIUM SERPL-MCNC: 8.8 MG/DL (ref 8.6–10.4)
CHLORIDE SERPL-SCNC: 94 MMOL/L (ref 98–107)
CO2 SERPL-SCNC: 28 MMOL/L (ref 20–31)
CREAT SERPL-MCNC: 1.4 MG/DL (ref 0.7–1.2)
EOSINOPHIL # BLD: 0.04 K/UL (ref 0–0.4)
EOSINOPHILS RELATIVE PERCENT: 0 % (ref 1–4)
ERYTHROCYTE [DISTWIDTH] IN BLOOD BY AUTOMATED COUNT: 12.1 % (ref 12.5–15.4)
GFR, ESTIMATED: 58 ML/MIN/1.73M2
GLUCOSE BLD-MCNC: 160 MG/DL (ref 75–110)
GLUCOSE BLD-MCNC: 211 MG/DL (ref 75–110)
GLUCOSE BLD-MCNC: 215 MG/DL (ref 75–110)
GLUCOSE BLD-MCNC: 301 MG/DL (ref 75–110)
GLUCOSE BLD-MCNC: 305 MG/DL (ref 75–110)
GLUCOSE SERPL-MCNC: 190 MG/DL (ref 70–99)
HCT VFR BLD AUTO: 34.5 % (ref 41–53)
HGB BLD-MCNC: 11.7 G/DL (ref 13.5–17.5)
LYMPHOCYTES NFR BLD: 1.1 K/UL (ref 1–4.8)
LYMPHOCYTES RELATIVE PERCENT: 6 % (ref 24–44)
M PNEUMO IGM SER QL IA: 0.32
MCH RBC QN AUTO: 30.8 PG (ref 26–34)
MCHC RBC AUTO-ENTMCNC: 33.9 G/DL (ref 31–37)
MCV RBC AUTO: 90.8 FL (ref 80–100)
MONOCYTES NFR BLD: 1.43 K/UL (ref 0.1–1.2)
MONOCYTES NFR BLD: 7 % (ref 2–11)
NEUTROPHILS NFR BLD: 87 % (ref 36–66)
NEUTS SEG NFR BLD: 17.36 K/UL (ref 1.8–7.7)
PARTIAL THROMBOPLASTIN TIME: 33.1 SEC (ref 24–36)
PARTIAL THROMBOPLASTIN TIME: 34.1 SEC (ref 24–36)
PARTIAL THROMBOPLASTIN TIME: 36.4 SEC (ref 24–36)
PARTIAL THROMBOPLASTIN TIME: 37.8 SEC (ref 24–36)
PLATELET # BLD AUTO: 256 K/UL (ref 140–450)
PMV BLD AUTO: 10.5 FL (ref 8–14)
POTASSIUM SERPL-SCNC: 4.6 MMOL/L (ref 3.7–5.3)
PROT SERPL-MCNC: 6.6 G/DL (ref 6.4–8.3)
RBC # BLD AUTO: 3.8 M/UL (ref 4.5–5.9)
SODIUM SERPL-SCNC: 132 MMOL/L (ref 135–144)
WBC OTHER # BLD: 19.9 K/UL (ref 3.5–11)

## 2025-02-26 PROCEDURE — 99232 SBSQ HOSP IP/OBS MODERATE 35: CPT | Performed by: INTERNAL MEDICINE

## 2025-02-26 PROCEDURE — 36415 COLL VENOUS BLD VENIPUNCTURE: CPT

## 2025-02-26 PROCEDURE — 6360000002 HC RX W HCPCS: Performed by: INTERNAL MEDICINE

## 2025-02-26 PROCEDURE — 99232 SBSQ HOSP IP/OBS MODERATE 35: CPT | Performed by: HOSPITALIST

## 2025-02-26 PROCEDURE — 85730 THROMBOPLASTIN TIME PARTIAL: CPT

## 2025-02-26 PROCEDURE — 6370000000 HC RX 637 (ALT 250 FOR IP): Performed by: NURSE PRACTITIONER

## 2025-02-26 PROCEDURE — 82947 ASSAY GLUCOSE BLOOD QUANT: CPT

## 2025-02-26 PROCEDURE — 6360000002 HC RX W HCPCS: Performed by: HOSPITALIST

## 2025-02-26 PROCEDURE — 2700000000 HC OXYGEN THERAPY PER DAY

## 2025-02-26 PROCEDURE — 6370000000 HC RX 637 (ALT 250 FOR IP): Performed by: INTERNAL MEDICINE

## 2025-02-26 PROCEDURE — 85025 COMPLETE CBC W/AUTO DIFF WBC: CPT

## 2025-02-26 PROCEDURE — 94660 CPAP INITIATION&MGMT: CPT

## 2025-02-26 PROCEDURE — 94640 AIRWAY INHALATION TREATMENT: CPT

## 2025-02-26 PROCEDURE — 85520 HEPARIN ASSAY: CPT

## 2025-02-26 PROCEDURE — 2060000000 HC ICU INTERMEDIATE R&B

## 2025-02-26 PROCEDURE — 6370000000 HC RX 637 (ALT 250 FOR IP): Performed by: HOSPITALIST

## 2025-02-26 PROCEDURE — 94761 N-INVAS EAR/PLS OXIMETRY MLT: CPT

## 2025-02-26 PROCEDURE — 97116 GAIT TRAINING THERAPY: CPT

## 2025-02-26 PROCEDURE — 6370000000 HC RX 637 (ALT 250 FOR IP): Performed by: STUDENT IN AN ORGANIZED HEALTH CARE EDUCATION/TRAINING PROGRAM

## 2025-02-26 PROCEDURE — 80053 COMPREHEN METABOLIC PANEL: CPT

## 2025-02-26 PROCEDURE — 2580000003 HC RX 258: Performed by: HOSPITALIST

## 2025-02-26 PROCEDURE — 94669 MECHANICAL CHEST WALL OSCILL: CPT

## 2025-02-26 PROCEDURE — 6360000002 HC RX W HCPCS: Performed by: STUDENT IN AN ORGANIZED HEALTH CARE EDUCATION/TRAINING PROGRAM

## 2025-02-26 RX ORDER — INSULIN LISPRO 100 [IU]/ML
0-16 INJECTION, SOLUTION INTRAVENOUS; SUBCUTANEOUS
Status: DISCONTINUED | OUTPATIENT
Start: 2025-02-26 | End: 2025-03-05 | Stop reason: HOSPADM

## 2025-02-26 RX ADMIN — BUDESONIDE AND FORMOTEROL FUMARATE DIHYDRATE 2 PUFF: 160; 4.5 AEROSOL RESPIRATORY (INHALATION) at 20:26

## 2025-02-26 RX ADMIN — ASPIRIN 81 MG: 81 TABLET, CHEWABLE ORAL at 07:46

## 2025-02-26 RX ADMIN — HEPARIN SODIUM 7 UNITS/KG/HR: 10000 INJECTION, SOLUTION INTRAVENOUS at 14:06

## 2025-02-26 RX ADMIN — ALBUTEROL SULFATE 2.5 MG: 2.5 SOLUTION RESPIRATORY (INHALATION) at 20:26

## 2025-02-26 RX ADMIN — ATORVASTATIN CALCIUM 20 MG: 20 TABLET, FILM COATED ORAL at 07:46

## 2025-02-26 RX ADMIN — ALBUTEROL SULFATE 2.5 MG: 2.5 SOLUTION RESPIRATORY (INHALATION) at 15:46

## 2025-02-26 RX ADMIN — CEFEPIME 2000 MG: 2 INJECTION, POWDER, FOR SOLUTION INTRAVENOUS at 20:20

## 2025-02-26 RX ADMIN — TRAMADOL HYDROCHLORIDE 25 MG: 50 TABLET, COATED ORAL at 11:09

## 2025-02-26 RX ADMIN — CEFEPIME 2000 MG: 2 INJECTION, POWDER, FOR SOLUTION INTRAVENOUS at 07:51

## 2025-02-26 RX ADMIN — GUAIFENESIN 1200 MG: 600 TABLET, EXTENDED RELEASE ORAL at 07:46

## 2025-02-26 RX ADMIN — CARVEDILOL 25 MG: 12.5 TABLET, FILM COATED ORAL at 07:45

## 2025-02-26 RX ADMIN — HYDROMORPHONE HYDROCHLORIDE 1 MG: 1 INJECTION, SOLUTION INTRAMUSCULAR; INTRAVENOUS; SUBCUTANEOUS at 20:09

## 2025-02-26 RX ADMIN — INSULIN LISPRO 4 UNITS: 100 INJECTION, SOLUTION INTRAVENOUS; SUBCUTANEOUS at 20:18

## 2025-02-26 RX ADMIN — SPIRONOLACTONE 25 MG: 25 TABLET, FILM COATED ORAL at 07:46

## 2025-02-26 RX ADMIN — INSULIN LISPRO 2 UNITS: 100 INJECTION, SOLUTION INTRAVENOUS; SUBCUTANEOUS at 11:06

## 2025-02-26 RX ADMIN — HEPARIN SODIUM 9 UNITS/KG/HR: 10000 INJECTION, SOLUTION INTRAVENOUS at 11:29

## 2025-02-26 RX ADMIN — EMPAGLIFLOZIN 10 MG: 10 TABLET, FILM COATED ORAL at 07:52

## 2025-02-26 RX ADMIN — BUDESONIDE AND FORMOTEROL FUMARATE DIHYDRATE 2 PUFF: 160; 4.5 AEROSOL RESPIRATORY (INHALATION) at 08:02

## 2025-02-26 RX ADMIN — ALBUTEROL SULFATE 2.5 MG: 2.5 SOLUTION RESPIRATORY (INHALATION) at 08:02

## 2025-02-26 RX ADMIN — HYDROMORPHONE HYDROCHLORIDE 1 MG: 1 INJECTION, SOLUTION INTRAMUSCULAR; INTRAVENOUS; SUBCUTANEOUS at 07:45

## 2025-02-26 RX ADMIN — FUROSEMIDE 40 MG: 10 INJECTION, SOLUTION INTRAMUSCULAR; INTRAVENOUS at 16:18

## 2025-02-26 RX ADMIN — HYDROMORPHONE HYDROCHLORIDE 1 MG: 1 INJECTION, SOLUTION INTRAMUSCULAR; INTRAVENOUS; SUBCUTANEOUS at 16:17

## 2025-02-26 RX ADMIN — ALBUTEROL SULFATE 2.5 MG: 2.5 SOLUTION RESPIRATORY (INHALATION) at 12:05

## 2025-02-26 RX ADMIN — HYDROMORPHONE HYDROCHLORIDE 1 MG: 1 INJECTION, SOLUTION INTRAMUSCULAR; INTRAVENOUS; SUBCUTANEOUS at 02:39

## 2025-02-26 RX ADMIN — TRAMADOL HYDROCHLORIDE 25 MG: 50 TABLET, COATED ORAL at 05:21

## 2025-02-26 RX ADMIN — CARVEDILOL 25 MG: 12.5 TABLET, FILM COATED ORAL at 16:17

## 2025-02-26 RX ADMIN — TRAMADOL HYDROCHLORIDE 25 MG: 50 TABLET, COATED ORAL at 18:39

## 2025-02-26 RX ADMIN — INSULIN LISPRO 12 UNITS: 100 INJECTION, SOLUTION INTRAVENOUS; SUBCUTANEOUS at 16:18

## 2025-02-26 RX ADMIN — ACETYLCYSTEINE 200 MG: 200 SOLUTION ORAL; RESPIRATORY (INHALATION) at 08:02

## 2025-02-26 RX ADMIN — ACETYLCYSTEINE 200 MG: 200 SOLUTION ORAL; RESPIRATORY (INHALATION) at 20:26

## 2025-02-26 RX ADMIN — FUROSEMIDE 40 MG: 10 INJECTION, SOLUTION INTRAMUSCULAR; INTRAVENOUS at 07:45

## 2025-02-26 RX ADMIN — INSULIN LISPRO 2 UNITS: 100 INJECTION, SOLUTION INTRAVENOUS; SUBCUTANEOUS at 07:46

## 2025-02-26 RX ADMIN — GUAIFENESIN 1200 MG: 600 TABLET, EXTENDED RELEASE ORAL at 20:10

## 2025-02-26 RX ADMIN — ACETYLCYSTEINE 200 MG: 200 SOLUTION ORAL; RESPIRATORY (INHALATION) at 15:46

## 2025-02-26 ASSESSMENT — PAIN SCALES - WONG BAKER
WONGBAKER_NUMERICALRESPONSE: NO HURT
WONGBAKER_NUMERICALRESPONSE: NO HURT

## 2025-02-26 ASSESSMENT — PAIN DESCRIPTION - DESCRIPTORS
DESCRIPTORS: ACHING;DULL
DESCRIPTORS: ACHING
DESCRIPTORS: ACHING;DULL
DESCRIPTORS: ACHING;DULL

## 2025-02-26 ASSESSMENT — PAIN SCALES - GENERAL
PAINLEVEL_OUTOF10: 9
PAINLEVEL_OUTOF10: 6
PAINLEVEL_OUTOF10: 10
PAINLEVEL_OUTOF10: 8
PAINLEVEL_OUTOF10: 8
PAINLEVEL_OUTOF10: 5
PAINLEVEL_OUTOF10: 8
PAINLEVEL_OUTOF10: 8
PAINLEVEL_OUTOF10: 10
PAINLEVEL_OUTOF10: 10

## 2025-02-26 ASSESSMENT — PAIN DESCRIPTION - ORIENTATION
ORIENTATION: RIGHT

## 2025-02-26 ASSESSMENT — PAIN DESCRIPTION - LOCATION
LOCATION: RIB CAGE

## 2025-02-26 ASSESSMENT — PAIN DESCRIPTION - FREQUENCY
FREQUENCY: CONTINUOUS
FREQUENCY: INTERMITTENT

## 2025-02-26 ASSESSMENT — PAIN - FUNCTIONAL ASSESSMENT
PAIN_FUNCTIONAL_ASSESSMENT: ACTIVITIES ARE NOT PREVENTED
PAIN_FUNCTIONAL_ASSESSMENT: PREVENTS OR INTERFERES SOME ACTIVE ACTIVITIES AND ADLS

## 2025-02-26 ASSESSMENT — PAIN DESCRIPTION - PAIN TYPE
TYPE: ACUTE PAIN
TYPE: ACUTE PAIN

## 2025-02-26 ASSESSMENT — PAIN DESCRIPTION - ONSET
ONSET: ON-GOING
ONSET: ON-GOING

## 2025-02-26 NOTE — CARE COORDINATION
[facility] Quality Flow/Interdisciplinary Rounds Progress Note    Quality Flow Rounds held on February 26, 2025 at 0930    Disciplines Attending:  Bedside Nurse, , and Nursing Unit Leadership    Barriers to Discharge: Clinical status    Anticipated Discharge Date:   2/28/25    Anticipated Discharge Disposition: Home    [facility] RISK OF UNPLANNED READMISSION 2.0             19.8 Total Score        Discussed patient goal for the day, patient clinical progression, and barriers to discharge.  Patient currently on [place] NC 45L 75%. Pulmonology and Cardiology following. Current with [employer] for 2L home oxygen. Goal to return home at discharge.      Bella Gonzales RN  February 26, 2025

## 2025-02-27 LAB
ANION GAP SERPL CALCULATED.3IONS-SCNC: 10 MMOL/L (ref 9–17)
ANTI-XA UNFRAC HEPARIN: 0.35 IU/L (ref 0.3–0.7)
ANTI-XA UNFRAC HEPARIN: 0.48 IU/L (ref 0.3–0.7)
BUN SERPL-MCNC: 42 MG/DL (ref 6–20)
CALCIUM SERPL-MCNC: 9 MG/DL (ref 8.6–10.4)
CHLORIDE SERPL-SCNC: 93 MMOL/L (ref 98–107)
CO2 SERPL-SCNC: 30 MMOL/L (ref 20–31)
CREAT SERPL-MCNC: 1.4 MG/DL (ref 0.7–1.2)
ERYTHROCYTE [DISTWIDTH] IN BLOOD BY AUTOMATED COUNT: 12.6 % (ref 12.5–15.4)
GFR, ESTIMATED: 58 ML/MIN/1.73M2
GLUCOSE BLD-MCNC: 165 MG/DL (ref 75–110)
GLUCOSE BLD-MCNC: 206 MG/DL (ref 75–110)
GLUCOSE BLD-MCNC: 213 MG/DL (ref 75–110)
GLUCOSE BLD-MCNC: 252 MG/DL (ref 75–110)
GLUCOSE SERPL-MCNC: 195 MG/DL (ref 70–99)
HCT VFR BLD AUTO: 34.8 % (ref 41–53)
HGB BLD-MCNC: 11.7 G/DL (ref 13.5–17.5)
MCH RBC QN AUTO: 30.7 PG (ref 26–34)
MCHC RBC AUTO-ENTMCNC: 33.7 G/DL (ref 31–37)
MCV RBC AUTO: 91 FL (ref 80–100)
MICROORGANISM SPEC CULT: ABNORMAL
MICROORGANISM SPEC CULT: ABNORMAL
MICROORGANISM SPEC CULT: NORMAL
MICROORGANISM SPEC CULT: NORMAL
MICROORGANISM/AGENT SPEC: ABNORMAL
PARTIAL THROMBOPLASTIN TIME: 26.7 SEC (ref 24–36)
PARTIAL THROMBOPLASTIN TIME: 33.5 SEC (ref 24–36)
PARTIAL THROMBOPLASTIN TIME: 34.2 SEC (ref 24–36)
PLATELET # BLD AUTO: 281 K/UL (ref 140–450)
PMV BLD AUTO: 8.5 FL (ref 6–12)
POTASSIUM SERPL-SCNC: 4.2 MMOL/L (ref 3.7–5.3)
RBC # BLD AUTO: 3.83 M/UL (ref 4.5–5.9)
SERVICE CMNT-IMP: ABNORMAL
SERVICE CMNT-IMP: NORMAL
SERVICE CMNT-IMP: NORMAL
SODIUM SERPL-SCNC: 133 MMOL/L (ref 135–144)
SPECIMEN DESCRIPTION: ABNORMAL
SPECIMEN DESCRIPTION: NORMAL
SPECIMEN DESCRIPTION: NORMAL
WBC OTHER # BLD: 19.1 K/UL (ref 3.5–11)

## 2025-02-27 PROCEDURE — 97116 GAIT TRAINING THERAPY: CPT

## 2025-02-27 PROCEDURE — 85520 HEPARIN ASSAY: CPT

## 2025-02-27 PROCEDURE — 6370000000 HC RX 637 (ALT 250 FOR IP): Performed by: STUDENT IN AN ORGANIZED HEALTH CARE EDUCATION/TRAINING PROGRAM

## 2025-02-27 PROCEDURE — 6370000000 HC RX 637 (ALT 250 FOR IP): Performed by: HOSPITALIST

## 2025-02-27 PROCEDURE — 85730 THROMBOPLASTIN TIME PARTIAL: CPT

## 2025-02-27 PROCEDURE — 94660 CPAP INITIATION&MGMT: CPT

## 2025-02-27 PROCEDURE — 2500000003 HC RX 250 WO HCPCS: Performed by: STUDENT IN AN ORGANIZED HEALTH CARE EDUCATION/TRAINING PROGRAM

## 2025-02-27 PROCEDURE — 6360000002 HC RX W HCPCS: Performed by: HOSPITALIST

## 2025-02-27 PROCEDURE — 94640 AIRWAY INHALATION TREATMENT: CPT

## 2025-02-27 PROCEDURE — 2580000003 HC RX 258: Performed by: HOSPITALIST

## 2025-02-27 PROCEDURE — 36415 COLL VENOUS BLD VENIPUNCTURE: CPT

## 2025-02-27 PROCEDURE — 99232 SBSQ HOSP IP/OBS MODERATE 35: CPT | Performed by: INTERNAL MEDICINE

## 2025-02-27 PROCEDURE — 6370000000 HC RX 637 (ALT 250 FOR IP): Performed by: NURSE PRACTITIONER

## 2025-02-27 PROCEDURE — 6360000002 HC RX W HCPCS: Performed by: INTERNAL MEDICINE

## 2025-02-27 PROCEDURE — 99232 SBSQ HOSP IP/OBS MODERATE 35: CPT | Performed by: HOSPITALIST

## 2025-02-27 PROCEDURE — 6370000000 HC RX 637 (ALT 250 FOR IP): Performed by: INTERNAL MEDICINE

## 2025-02-27 PROCEDURE — 2060000000 HC ICU INTERMEDIATE R&B

## 2025-02-27 PROCEDURE — 2700000000 HC OXYGEN THERAPY PER DAY

## 2025-02-27 PROCEDURE — 80048 BASIC METABOLIC PNL TOTAL CA: CPT

## 2025-02-27 PROCEDURE — 82947 ASSAY GLUCOSE BLOOD QUANT: CPT

## 2025-02-27 PROCEDURE — 6360000002 HC RX W HCPCS: Performed by: STUDENT IN AN ORGANIZED HEALTH CARE EDUCATION/TRAINING PROGRAM

## 2025-02-27 PROCEDURE — 94761 N-INVAS EAR/PLS OXIMETRY MLT: CPT

## 2025-02-27 PROCEDURE — 85027 COMPLETE CBC AUTOMATED: CPT

## 2025-02-27 RX ADMIN — GUAIFENESIN 1200 MG: 600 TABLET, EXTENDED RELEASE ORAL at 21:07

## 2025-02-27 RX ADMIN — HYDROMORPHONE HYDROCHLORIDE 1 MG: 1 INJECTION, SOLUTION INTRAMUSCULAR; INTRAVENOUS; SUBCUTANEOUS at 17:17

## 2025-02-27 RX ADMIN — ACETYLCYSTEINE 200 MG: 200 SOLUTION ORAL; RESPIRATORY (INHALATION) at 08:13

## 2025-02-27 RX ADMIN — SODIUM CHLORIDE, PRESERVATIVE FREE 10 ML: 5 INJECTION INTRAVENOUS at 21:07

## 2025-02-27 RX ADMIN — CEFEPIME 2000 MG: 2 INJECTION, POWDER, FOR SOLUTION INTRAVENOUS at 09:04

## 2025-02-27 RX ADMIN — ALBUTEROL SULFATE 2.5 MG: 2.5 SOLUTION RESPIRATORY (INHALATION) at 15:34

## 2025-02-27 RX ADMIN — ALBUTEROL SULFATE 2.5 MG: 2.5 SOLUTION RESPIRATORY (INHALATION) at 08:13

## 2025-02-27 RX ADMIN — INSULIN LISPRO 6 UNITS: 100 INJECTION, SOLUTION INTRAVENOUS; SUBCUTANEOUS at 08:52

## 2025-02-27 RX ADMIN — TRAMADOL HYDROCHLORIDE 25 MG: 50 TABLET, COATED ORAL at 19:27

## 2025-02-27 RX ADMIN — INSULIN LISPRO 8 UNITS: 100 INJECTION, SOLUTION INTRAVENOUS; SUBCUTANEOUS at 21:36

## 2025-02-27 RX ADMIN — CEFEPIME 2000 MG: 2 INJECTION, POWDER, FOR SOLUTION INTRAVENOUS at 21:07

## 2025-02-27 RX ADMIN — TRAMADOL HYDROCHLORIDE 25 MG: 50 TABLET, COATED ORAL at 07:10

## 2025-02-27 RX ADMIN — ACETYLCYSTEINE 200 MG: 200 SOLUTION ORAL; RESPIRATORY (INHALATION) at 20:10

## 2025-02-27 RX ADMIN — ACETYLCYSTEINE 200 MG: 200 SOLUTION ORAL; RESPIRATORY (INHALATION) at 11:31

## 2025-02-27 RX ADMIN — INSULIN LISPRO 4 UNITS: 100 INJECTION, SOLUTION INTRAVENOUS; SUBCUTANEOUS at 17:17

## 2025-02-27 RX ADMIN — ACETYLCYSTEINE 200 MG: 200 SOLUTION ORAL; RESPIRATORY (INHALATION) at 15:34

## 2025-02-27 RX ADMIN — TRAMADOL HYDROCHLORIDE 25 MG: 50 TABLET, COATED ORAL at 14:43

## 2025-02-27 RX ADMIN — FUROSEMIDE 40 MG: 10 INJECTION, SOLUTION INTRAMUSCULAR; INTRAVENOUS at 17:17

## 2025-02-27 RX ADMIN — EMPAGLIFLOZIN 10 MG: 10 TABLET, FILM COATED ORAL at 08:52

## 2025-02-27 RX ADMIN — BUDESONIDE AND FORMOTEROL FUMARATE DIHYDRATE 2 PUFF: 160; 4.5 AEROSOL RESPIRATORY (INHALATION) at 08:14

## 2025-02-27 RX ADMIN — ALBUTEROL SULFATE 2.5 MG: 2.5 SOLUTION RESPIRATORY (INHALATION) at 20:11

## 2025-02-27 RX ADMIN — HYDROMORPHONE HYDROCHLORIDE 1 MG: 1 INJECTION, SOLUTION INTRAMUSCULAR; INTRAVENOUS; SUBCUTANEOUS at 05:43

## 2025-02-27 RX ADMIN — ALBUTEROL SULFATE 2.5 MG: 2.5 SOLUTION RESPIRATORY (INHALATION) at 22:56

## 2025-02-27 RX ADMIN — ASPIRIN 81 MG: 81 TABLET, CHEWABLE ORAL at 08:52

## 2025-02-27 RX ADMIN — ALBUTEROL SULFATE 2.5 MG: 2.5 SOLUTION RESPIRATORY (INHALATION) at 11:31

## 2025-02-27 RX ADMIN — CARVEDILOL 25 MG: 12.5 TABLET, FILM COATED ORAL at 17:17

## 2025-02-27 RX ADMIN — GUAIFENESIN 1200 MG: 600 TABLET, EXTENDED RELEASE ORAL at 08:52

## 2025-02-27 RX ADMIN — HYDROMORPHONE HYDROCHLORIDE 1 MG: 1 INJECTION, SOLUTION INTRAMUSCULAR; INTRAVENOUS; SUBCUTANEOUS at 09:01

## 2025-02-27 RX ADMIN — BUDESONIDE AND FORMOTEROL FUMARATE DIHYDRATE 2 PUFF: 160; 4.5 AEROSOL RESPIRATORY (INHALATION) at 20:11

## 2025-02-27 RX ADMIN — CARVEDILOL 25 MG: 12.5 TABLET, FILM COATED ORAL at 08:51

## 2025-02-27 RX ADMIN — SPIRONOLACTONE 25 MG: 25 TABLET, FILM COATED ORAL at 08:52

## 2025-02-27 RX ADMIN — FUROSEMIDE 40 MG: 10 INJECTION, SOLUTION INTRAMUSCULAR; INTRAVENOUS at 08:52

## 2025-02-27 RX ADMIN — HYDROMORPHONE HYDROCHLORIDE 1 MG: 1 INJECTION, SOLUTION INTRAMUSCULAR; INTRAVENOUS; SUBCUTANEOUS at 21:09

## 2025-02-27 RX ADMIN — ATORVASTATIN CALCIUM 20 MG: 20 TABLET, FILM COATED ORAL at 08:52

## 2025-02-27 ASSESSMENT — PAIN SCALES - GENERAL
PAINLEVEL_OUTOF10: 8
PAINLEVEL_OUTOF10: 6
PAINLEVEL_OUTOF10: 7
PAINLEVEL_OUTOF10: 7
PAINLEVEL_OUTOF10: 10
PAINLEVEL_OUTOF10: 8
PAINLEVEL_OUTOF10: 8
PAINLEVEL_OUTOF10: 10
PAINLEVEL_OUTOF10: 4
PAINLEVEL_OUTOF10: 4
PAINLEVEL_OUTOF10: 8

## 2025-02-27 ASSESSMENT — PAIN DESCRIPTION - LOCATION
LOCATION: RIB CAGE

## 2025-02-27 ASSESSMENT — PAIN DESCRIPTION - DESCRIPTORS
DESCRIPTORS: ACHING;DULL
DESCRIPTORS: ACHING
DESCRIPTORS: ACHING
DESCRIPTORS: ACHING;DULL
DESCRIPTORS: ACHING;DULL

## 2025-02-27 ASSESSMENT — PAIN DESCRIPTION - PAIN TYPE
TYPE: ACUTE PAIN

## 2025-02-27 ASSESSMENT — PAIN DESCRIPTION - ORIENTATION
ORIENTATION: RIGHT

## 2025-02-27 ASSESSMENT — PAIN SCALES - WONG BAKER: WONGBAKER_NUMERICALRESPONSE: NO HURT

## 2025-02-27 ASSESSMENT — PAIN DESCRIPTION - FREQUENCY
FREQUENCY: CONTINUOUS
FREQUENCY: CONTINUOUS
FREQUENCY: INTERMITTENT

## 2025-02-27 ASSESSMENT — PAIN - FUNCTIONAL ASSESSMENT
PAIN_FUNCTIONAL_ASSESSMENT: PREVENTS OR INTERFERES SOME ACTIVE ACTIVITIES AND ADLS

## 2025-02-27 ASSESSMENT — PAIN DESCRIPTION - ONSET
ONSET: ON-GOING
ONSET: ON-GOING

## 2025-02-27 NOTE — CARE COORDINATION
[facility] Quality Flow/Interdisciplinary Rounds Progress Note    Quality Flow Rounds held on February 27, 2025 at 0930    Disciplines Attending:  Bedside Nurse, , , and Nursing Unit Leadership    Barriers to Discharge: clinical status    Anticipated Discharge Date:  TBD    Anticipated Discharge Disposition: home    [facility] RISK OF UNPLANNED READMISSION 2.0             18.2 Total Score        Discussed patient goal for the day, patient clinical progression, and barriers to discharge. Patient reports feeling better today. He is down to 8L NC from 45L yesterday. Plan for cardiac cath once stable from a respiratory standpoint. States he plans to return home at discharge and declines SNF for LTACH. Patient has an Inogen from [facility]. States his baseline is 2L NC. CM will continue to follow for potential needs and change in home O2 requirements.     Neeta Thomas  February 27, 2025

## 2025-02-28 ENCOUNTER — APPOINTMENT (OUTPATIENT)
Dept: GENERAL RADIOLOGY | Age: 60
DRG: 286 | End: 2025-02-28
Payer: COMMERCIAL

## 2025-02-28 LAB
ANION GAP SERPL CALCULATED.3IONS-SCNC: 8 MMOL/L (ref 9–17)
ANTI-XA UNFRAC HEPARIN: 0.32 IU/L (ref 0.3–0.7)
BUN SERPL-MCNC: 41 MG/DL (ref 6–20)
CALCIUM SERPL-MCNC: 9.3 MG/DL (ref 8.6–10.4)
CHLORIDE SERPL-SCNC: 92 MMOL/L (ref 98–107)
CO2 SERPL-SCNC: 31 MMOL/L (ref 20–31)
CREAT SERPL-MCNC: 1.3 MG/DL (ref 0.7–1.2)
GFR, ESTIMATED: 63 ML/MIN/1.73M2
GLUCOSE BLD-MCNC: 136 MG/DL (ref 75–110)
GLUCOSE BLD-MCNC: 191 MG/DL (ref 75–110)
GLUCOSE BLD-MCNC: 247 MG/DL (ref 75–110)
GLUCOSE BLD-MCNC: 284 MG/DL (ref 75–110)
GLUCOSE SERPL-MCNC: 141 MG/DL (ref 70–99)
PARTIAL THROMBOPLASTIN TIME: 34.1 SEC (ref 24–36)
PARTIAL THROMBOPLASTIN TIME: 34.1 SEC (ref 24–36)
POTASSIUM SERPL-SCNC: 4.4 MMOL/L (ref 3.7–5.3)
SODIUM SERPL-SCNC: 131 MMOL/L (ref 135–144)

## 2025-02-28 PROCEDURE — 82947 ASSAY GLUCOSE BLOOD QUANT: CPT

## 2025-02-28 PROCEDURE — 6370000000 HC RX 637 (ALT 250 FOR IP): Performed by: NURSE PRACTITIONER

## 2025-02-28 PROCEDURE — 99232 SBSQ HOSP IP/OBS MODERATE 35: CPT | Performed by: HOSPITALIST

## 2025-02-28 PROCEDURE — 2060000000 HC ICU INTERMEDIATE R&B

## 2025-02-28 PROCEDURE — 99232 SBSQ HOSP IP/OBS MODERATE 35: CPT

## 2025-02-28 PROCEDURE — 6360000002 HC RX W HCPCS: Performed by: STUDENT IN AN ORGANIZED HEALTH CARE EDUCATION/TRAINING PROGRAM

## 2025-02-28 PROCEDURE — 36415 COLL VENOUS BLD VENIPUNCTURE: CPT

## 2025-02-28 PROCEDURE — 94761 N-INVAS EAR/PLS OXIMETRY MLT: CPT

## 2025-02-28 PROCEDURE — 94640 AIRWAY INHALATION TREATMENT: CPT

## 2025-02-28 PROCEDURE — 2500000003 HC RX 250 WO HCPCS: Performed by: STUDENT IN AN ORGANIZED HEALTH CARE EDUCATION/TRAINING PROGRAM

## 2025-02-28 PROCEDURE — 94660 CPAP INITIATION&MGMT: CPT

## 2025-02-28 PROCEDURE — 6360000002 HC RX W HCPCS: Performed by: HOSPITALIST

## 2025-02-28 PROCEDURE — 85730 THROMBOPLASTIN TIME PARTIAL: CPT

## 2025-02-28 PROCEDURE — 85520 HEPARIN ASSAY: CPT

## 2025-02-28 PROCEDURE — 6370000000 HC RX 637 (ALT 250 FOR IP): Performed by: INTERNAL MEDICINE

## 2025-02-28 PROCEDURE — 6370000000 HC RX 637 (ALT 250 FOR IP): Performed by: HOSPITALIST

## 2025-02-28 PROCEDURE — 6370000000 HC RX 637 (ALT 250 FOR IP): Performed by: STUDENT IN AN ORGANIZED HEALTH CARE EDUCATION/TRAINING PROGRAM

## 2025-02-28 PROCEDURE — 97116 GAIT TRAINING THERAPY: CPT

## 2025-02-28 PROCEDURE — 71045 X-RAY EXAM CHEST 1 VIEW: CPT

## 2025-02-28 PROCEDURE — 2580000003 HC RX 258: Performed by: HOSPITALIST

## 2025-02-28 PROCEDURE — 6360000002 HC RX W HCPCS: Performed by: INTERNAL MEDICINE

## 2025-02-28 PROCEDURE — 80048 BASIC METABOLIC PNL TOTAL CA: CPT

## 2025-02-28 PROCEDURE — 2700000000 HC OXYGEN THERAPY PER DAY

## 2025-02-28 RX ADMIN — HYDROMORPHONE HYDROCHLORIDE 1 MG: 1 INJECTION, SOLUTION INTRAMUSCULAR; INTRAVENOUS; SUBCUTANEOUS at 11:52

## 2025-02-28 RX ADMIN — HYDROMORPHONE HYDROCHLORIDE 1 MG: 1 INJECTION, SOLUTION INTRAMUSCULAR; INTRAVENOUS; SUBCUTANEOUS at 23:57

## 2025-02-28 RX ADMIN — TRAMADOL HYDROCHLORIDE 25 MG: 50 TABLET, COATED ORAL at 11:52

## 2025-02-28 RX ADMIN — TRAMADOL HYDROCHLORIDE 25 MG: 50 TABLET, COATED ORAL at 16:53

## 2025-02-28 RX ADMIN — INSULIN LISPRO 4 UNITS: 100 INJECTION, SOLUTION INTRAVENOUS; SUBCUTANEOUS at 12:23

## 2025-02-28 RX ADMIN — CEFEPIME 2000 MG: 2 INJECTION, POWDER, FOR SOLUTION INTRAVENOUS at 09:04

## 2025-02-28 RX ADMIN — CARVEDILOL 25 MG: 12.5 TABLET, FILM COATED ORAL at 16:53

## 2025-02-28 RX ADMIN — HYDRALAZINE HYDROCHLORIDE 5 MG: 20 INJECTION INTRAMUSCULAR; INTRAVENOUS at 20:12

## 2025-02-28 RX ADMIN — ALBUTEROL SULFATE 2.5 MG: 2.5 SOLUTION RESPIRATORY (INHALATION) at 15:25

## 2025-02-28 RX ADMIN — GUAIFENESIN 1200 MG: 600 TABLET, EXTENDED RELEASE ORAL at 20:06

## 2025-02-28 RX ADMIN — HYDROMORPHONE HYDROCHLORIDE 1 MG: 1 INJECTION, SOLUTION INTRAMUSCULAR; INTRAVENOUS; SUBCUTANEOUS at 00:56

## 2025-02-28 RX ADMIN — INSULIN LISPRO 8 UNITS: 100 INJECTION, SOLUTION INTRAVENOUS; SUBCUTANEOUS at 20:06

## 2025-02-28 RX ADMIN — SODIUM CHLORIDE, PRESERVATIVE FREE 10 ML: 5 INJECTION INTRAVENOUS at 20:07

## 2025-02-28 RX ADMIN — ATORVASTATIN CALCIUM 20 MG: 20 TABLET, FILM COATED ORAL at 08:45

## 2025-02-28 RX ADMIN — HYDROMORPHONE HYDROCHLORIDE 1 MG: 1 INJECTION, SOLUTION INTRAMUSCULAR; INTRAVENOUS; SUBCUTANEOUS at 16:52

## 2025-02-28 RX ADMIN — BUDESONIDE AND FORMOTEROL FUMARATE DIHYDRATE 2 PUFF: 160; 4.5 AEROSOL RESPIRATORY (INHALATION) at 08:05

## 2025-02-28 RX ADMIN — ACETYLCYSTEINE 200 MG: 200 SOLUTION ORAL; RESPIRATORY (INHALATION) at 08:05

## 2025-02-28 RX ADMIN — CARVEDILOL 25 MG: 12.5 TABLET, FILM COATED ORAL at 08:45

## 2025-02-28 RX ADMIN — FUROSEMIDE 40 MG: 10 INJECTION, SOLUTION INTRAMUSCULAR; INTRAVENOUS at 16:54

## 2025-02-28 RX ADMIN — ALBUTEROL SULFATE 2.5 MG: 2.5 SOLUTION RESPIRATORY (INHALATION) at 08:05

## 2025-02-28 RX ADMIN — INSULIN LISPRO 4 UNITS: 100 INJECTION, SOLUTION INTRAVENOUS; SUBCUTANEOUS at 16:53

## 2025-02-28 RX ADMIN — SODIUM CHLORIDE, PRESERVATIVE FREE 10 ML: 5 INJECTION INTRAVENOUS at 08:44

## 2025-02-28 RX ADMIN — ACETYLCYSTEINE 200 MG: 200 SOLUTION ORAL; RESPIRATORY (INHALATION) at 12:08

## 2025-02-28 RX ADMIN — ALBUTEROL SULFATE 2.5 MG: 2.5 SOLUTION RESPIRATORY (INHALATION) at 20:31

## 2025-02-28 RX ADMIN — GUAIFENESIN 1200 MG: 600 TABLET, EXTENDED RELEASE ORAL at 08:44

## 2025-02-28 RX ADMIN — FUROSEMIDE 40 MG: 10 INJECTION, SOLUTION INTRAMUSCULAR; INTRAVENOUS at 08:44

## 2025-02-28 RX ADMIN — ACETYLCYSTEINE 200 MG: 200 SOLUTION ORAL; RESPIRATORY (INHALATION) at 15:24

## 2025-02-28 RX ADMIN — BUDESONIDE AND FORMOTEROL FUMARATE DIHYDRATE 2 PUFF: 160; 4.5 AEROSOL RESPIRATORY (INHALATION) at 20:31

## 2025-02-28 RX ADMIN — EMPAGLIFLOZIN 10 MG: 10 TABLET, FILM COATED ORAL at 08:45

## 2025-02-28 RX ADMIN — SPIRONOLACTONE 25 MG: 25 TABLET, FILM COATED ORAL at 08:45

## 2025-02-28 RX ADMIN — HYDROMORPHONE HYDROCHLORIDE 1 MG: 1 INJECTION, SOLUTION INTRAMUSCULAR; INTRAVENOUS; SUBCUTANEOUS at 05:47

## 2025-02-28 RX ADMIN — ASPIRIN 81 MG: 81 TABLET, CHEWABLE ORAL at 08:45

## 2025-02-28 RX ADMIN — ALBUTEROL SULFATE 2.5 MG: 2.5 SOLUTION RESPIRATORY (INHALATION) at 12:08

## 2025-02-28 RX ADMIN — ACETAMINOPHEN 650 MG: 325 TABLET ORAL at 13:15

## 2025-02-28 RX ADMIN — HEPARIN SODIUM 7 UNITS/KG/HR: 10000 INJECTION, SOLUTION INTRAVENOUS at 00:54

## 2025-02-28 RX ADMIN — ACETYLCYSTEINE 200 MG: 200 SOLUTION ORAL; RESPIRATORY (INHALATION) at 20:31

## 2025-02-28 ASSESSMENT — PAIN DESCRIPTION - DESCRIPTORS
DESCRIPTORS: ACHING
DESCRIPTORS: SHARP
DESCRIPTORS: BURNING
DESCRIPTORS: SHARP

## 2025-02-28 ASSESSMENT — PAIN SCALES - GENERAL
PAINLEVEL_OUTOF10: 2
PAINLEVEL_OUTOF10: 3
PAINLEVEL_OUTOF10: 6
PAINLEVEL_OUTOF10: 7
PAINLEVEL_OUTOF10: 7
PAINLEVEL_OUTOF10: 2
PAINLEVEL_OUTOF10: 7
PAINLEVEL_OUTOF10: 2
PAINLEVEL_OUTOF10: 7
PAINLEVEL_OUTOF10: 2
PAINLEVEL_OUTOF10: 6

## 2025-02-28 ASSESSMENT — PAIN DESCRIPTION - ORIENTATION
ORIENTATION: RIGHT
ORIENTATION: LOWER;OTHER (COMMENT)
ORIENTATION: RIGHT
ORIENTATION: RIGHT
ORIENTATION: LOWER;UPPER

## 2025-02-28 ASSESSMENT — PAIN DESCRIPTION - FREQUENCY
FREQUENCY: INTERMITTENT
FREQUENCY: INTERMITTENT

## 2025-02-28 ASSESSMENT — PAIN DESCRIPTION - LOCATION
LOCATION: CHEST
LOCATION: RIB CAGE
LOCATION: ABDOMEN;CHEST
LOCATION: OTHER (COMMENT)
LOCATION: CHEST
LOCATION: CHEST;ABDOMEN
LOCATION: HEAD
LOCATION: RIB CAGE

## 2025-02-28 ASSESSMENT — PAIN DESCRIPTION - ONSET
ONSET: ON-GOING
ONSET: PROGRESSIVE

## 2025-02-28 ASSESSMENT — PAIN - FUNCTIONAL ASSESSMENT
PAIN_FUNCTIONAL_ASSESSMENT: ACTIVITIES ARE NOT PREVENTED
PAIN_FUNCTIONAL_ASSESSMENT: ACTIVITIES ARE NOT PREVENTED

## 2025-02-28 ASSESSMENT — PAIN DESCRIPTION - PAIN TYPE
TYPE: ACUTE PAIN
TYPE: ACUTE PAIN

## 2025-03-01 LAB
ANION GAP SERPL CALCULATED.3IONS-SCNC: 9 MMOL/L (ref 9–17)
ANTI-XA UNFRAC HEPARIN: 0.13 IU/L (ref 0.3–0.7)
ANTI-XA UNFRAC HEPARIN: 0.17 IU/L (ref 0.3–0.7)
ANTI-XA UNFRAC HEPARIN: 0.17 IU/L (ref 0.3–0.7)
BUN SERPL-MCNC: 41 MG/DL (ref 6–20)
CALCIUM SERPL-MCNC: 8.6 MG/DL (ref 8.6–10.4)
CHLORIDE SERPL-SCNC: 92 MMOL/L (ref 98–107)
CO2 SERPL-SCNC: 29 MMOL/L (ref 20–31)
CREAT SERPL-MCNC: 1.3 MG/DL (ref 0.7–1.2)
ERYTHROCYTE [DISTWIDTH] IN BLOOD BY AUTOMATED COUNT: 12.5 % (ref 12.5–15.4)
EST. AVERAGE GLUCOSE BLD GHB EST-MCNC: 174 MG/DL
GFR, ESTIMATED: 63 ML/MIN/1.73M2
GLUCOSE BLD-MCNC: 173 MG/DL (ref 75–110)
GLUCOSE BLD-MCNC: 180 MG/DL (ref 75–110)
GLUCOSE BLD-MCNC: 196 MG/DL (ref 75–110)
GLUCOSE SERPL-MCNC: 306 MG/DL (ref 70–99)
HBA1C MFR BLD: 7.7 % (ref 4–6)
HCT VFR BLD AUTO: 32 % (ref 41–53)
HGB BLD-MCNC: 10.9 G/DL (ref 13.5–17.5)
MCH RBC QN AUTO: 30.6 PG (ref 26–34)
MCHC RBC AUTO-ENTMCNC: 34 G/DL (ref 31–37)
MCV RBC AUTO: 90 FL (ref 80–100)
PLATELET # BLD AUTO: 280 K/UL (ref 140–450)
PMV BLD AUTO: 9.3 FL (ref 6–12)
POTASSIUM SERPL-SCNC: 4.3 MMOL/L (ref 3.7–5.3)
RBC # BLD AUTO: 3.55 M/UL (ref 4.5–5.9)
SODIUM SERPL-SCNC: 130 MMOL/L (ref 135–144)
WBC OTHER # BLD: 14.3 K/UL (ref 3.5–11)

## 2025-03-01 PROCEDURE — 83036 HEMOGLOBIN GLYCOSYLATED A1C: CPT

## 2025-03-01 PROCEDURE — 6360000002 HC RX W HCPCS: Performed by: STUDENT IN AN ORGANIZED HEALTH CARE EDUCATION/TRAINING PROGRAM

## 2025-03-01 PROCEDURE — 6360000002 HC RX W HCPCS: Performed by: INTERNAL MEDICINE

## 2025-03-01 PROCEDURE — 94640 AIRWAY INHALATION TREATMENT: CPT

## 2025-03-01 PROCEDURE — 85520 HEPARIN ASSAY: CPT

## 2025-03-01 PROCEDURE — 80048 BASIC METABOLIC PNL TOTAL CA: CPT

## 2025-03-01 PROCEDURE — 36415 COLL VENOUS BLD VENIPUNCTURE: CPT

## 2025-03-01 PROCEDURE — 99232 SBSQ HOSP IP/OBS MODERATE 35: CPT | Performed by: INTERNAL MEDICINE

## 2025-03-01 PROCEDURE — 82947 ASSAY GLUCOSE BLOOD QUANT: CPT

## 2025-03-01 PROCEDURE — 85027 COMPLETE CBC AUTOMATED: CPT

## 2025-03-01 PROCEDURE — 94761 N-INVAS EAR/PLS OXIMETRY MLT: CPT

## 2025-03-01 PROCEDURE — 6370000000 HC RX 637 (ALT 250 FOR IP): Performed by: INTERNAL MEDICINE

## 2025-03-01 PROCEDURE — 2700000000 HC OXYGEN THERAPY PER DAY

## 2025-03-01 PROCEDURE — 2060000000 HC ICU INTERMEDIATE R&B

## 2025-03-01 PROCEDURE — 2500000003 HC RX 250 WO HCPCS: Performed by: STUDENT IN AN ORGANIZED HEALTH CARE EDUCATION/TRAINING PROGRAM

## 2025-03-01 PROCEDURE — 6370000000 HC RX 637 (ALT 250 FOR IP): Performed by: NURSE PRACTITIONER

## 2025-03-01 PROCEDURE — 6370000000 HC RX 637 (ALT 250 FOR IP): Performed by: HOSPITALIST

## 2025-03-01 PROCEDURE — 6370000000 HC RX 637 (ALT 250 FOR IP): Performed by: STUDENT IN AN ORGANIZED HEALTH CARE EDUCATION/TRAINING PROGRAM

## 2025-03-01 PROCEDURE — 94660 CPAP INITIATION&MGMT: CPT

## 2025-03-01 PROCEDURE — 99232 SBSQ HOSP IP/OBS MODERATE 35: CPT | Performed by: HOSPITALIST

## 2025-03-01 PROCEDURE — 6360000002 HC RX W HCPCS: Performed by: HOSPITALIST

## 2025-03-01 RX ADMIN — HEPARIN SODIUM 11 UNITS/KG/HR: 10000 INJECTION, SOLUTION INTRAVENOUS at 16:12

## 2025-03-01 RX ADMIN — TRAMADOL HYDROCHLORIDE 25 MG: 50 TABLET, COATED ORAL at 03:18

## 2025-03-01 RX ADMIN — TRAMADOL HYDROCHLORIDE 25 MG: 50 TABLET, COATED ORAL at 13:44

## 2025-03-01 RX ADMIN — HEPARIN SODIUM 3900 UNITS: 1000 INJECTION INTRAVENOUS; SUBCUTANEOUS at 06:40

## 2025-03-01 RX ADMIN — HEPARIN SODIUM 3900 UNITS: 1000 INJECTION INTRAVENOUS; SUBCUTANEOUS at 22:08

## 2025-03-01 RX ADMIN — ACETYLCYSTEINE 200 MG: 200 SOLUTION ORAL; RESPIRATORY (INHALATION) at 20:17

## 2025-03-01 RX ADMIN — HYDROMORPHONE HYDROCHLORIDE 1 MG: 1 INJECTION, SOLUTION INTRAMUSCULAR; INTRAVENOUS; SUBCUTANEOUS at 21:37

## 2025-03-01 RX ADMIN — INSULIN LISPRO 12 UNITS: 100 INJECTION, SOLUTION INTRAVENOUS; SUBCUTANEOUS at 08:48

## 2025-03-01 RX ADMIN — ALBUTEROL SULFATE 2.5 MG: 2.5 SOLUTION RESPIRATORY (INHALATION) at 08:09

## 2025-03-01 RX ADMIN — GUAIFENESIN 1200 MG: 600 TABLET, EXTENDED RELEASE ORAL at 19:59

## 2025-03-01 RX ADMIN — ALBUTEROL SULFATE 2.5 MG: 2.5 SOLUTION RESPIRATORY (INHALATION) at 20:03

## 2025-03-01 RX ADMIN — ACETYLCYSTEINE 200 MG: 200 SOLUTION ORAL; RESPIRATORY (INHALATION) at 08:09

## 2025-03-01 RX ADMIN — TRAMADOL HYDROCHLORIDE 25 MG: 50 TABLET, COATED ORAL at 20:01

## 2025-03-01 RX ADMIN — ATORVASTATIN CALCIUM 20 MG: 20 TABLET, FILM COATED ORAL at 08:48

## 2025-03-01 RX ADMIN — ALBUTEROL SULFATE 2.5 MG: 2.5 SOLUTION RESPIRATORY (INHALATION) at 12:43

## 2025-03-01 RX ADMIN — BUDESONIDE AND FORMOTEROL FUMARATE DIHYDRATE 2 PUFF: 160; 4.5 AEROSOL RESPIRATORY (INHALATION) at 08:10

## 2025-03-01 RX ADMIN — FUROSEMIDE 40 MG: 10 INJECTION, SOLUTION INTRAMUSCULAR; INTRAVENOUS at 17:40

## 2025-03-01 RX ADMIN — ALBUTEROL SULFATE 2.5 MG: 2.5 SOLUTION RESPIRATORY (INHALATION) at 15:49

## 2025-03-01 RX ADMIN — SODIUM CHLORIDE, PRESERVATIVE FREE 10 ML: 5 INJECTION INTRAVENOUS at 08:50

## 2025-03-01 RX ADMIN — INSULIN LISPRO 4 UNITS: 100 INJECTION, SOLUTION INTRAVENOUS; SUBCUTANEOUS at 12:24

## 2025-03-01 RX ADMIN — HEPARIN SODIUM 3900 UNITS: 1000 INJECTION INTRAVENOUS; SUBCUTANEOUS at 14:48

## 2025-03-01 RX ADMIN — TRAMADOL HYDROCHLORIDE 25 MG: 50 TABLET, COATED ORAL at 08:49

## 2025-03-01 RX ADMIN — HYDROMORPHONE HYDROCHLORIDE 1 MG: 1 INJECTION, SOLUTION INTRAMUSCULAR; INTRAVENOUS; SUBCUTANEOUS at 08:50

## 2025-03-01 RX ADMIN — CARVEDILOL 25 MG: 12.5 TABLET, FILM COATED ORAL at 08:48

## 2025-03-01 RX ADMIN — INSULIN LISPRO 8 UNITS: 100 INJECTION, SOLUTION INTRAVENOUS; SUBCUTANEOUS at 17:40

## 2025-03-01 RX ADMIN — ACETYLCYSTEINE 200 MG: 200 SOLUTION ORAL; RESPIRATORY (INHALATION) at 15:50

## 2025-03-01 RX ADMIN — CARVEDILOL 25 MG: 12.5 TABLET, FILM COATED ORAL at 17:40

## 2025-03-01 RX ADMIN — SPIRONOLACTONE 25 MG: 25 TABLET, FILM COATED ORAL at 08:48

## 2025-03-01 RX ADMIN — FUROSEMIDE 40 MG: 10 INJECTION, SOLUTION INTRAMUSCULAR; INTRAVENOUS at 08:52

## 2025-03-01 RX ADMIN — BUDESONIDE AND FORMOTEROL FUMARATE DIHYDRATE 2 PUFF: 160; 4.5 AEROSOL RESPIRATORY (INHALATION) at 20:19

## 2025-03-01 RX ADMIN — ASPIRIN 81 MG: 81 TABLET, CHEWABLE ORAL at 08:48

## 2025-03-01 RX ADMIN — EMPAGLIFLOZIN 10 MG: 10 TABLET, FILM COATED ORAL at 08:48

## 2025-03-01 RX ADMIN — HYDROMORPHONE HYDROCHLORIDE 1 MG: 1 INJECTION, SOLUTION INTRAMUSCULAR; INTRAVENOUS; SUBCUTANEOUS at 17:40

## 2025-03-01 RX ADMIN — ACETYLCYSTEINE 200 MG: 200 SOLUTION ORAL; RESPIRATORY (INHALATION) at 12:44

## 2025-03-01 RX ADMIN — INSULIN LISPRO 4 UNITS: 100 INJECTION, SOLUTION INTRAVENOUS; SUBCUTANEOUS at 19:59

## 2025-03-01 RX ADMIN — GUAIFENESIN 1200 MG: 600 TABLET, EXTENDED RELEASE ORAL at 08:48

## 2025-03-01 ASSESSMENT — PAIN DESCRIPTION - DESCRIPTORS
DESCRIPTORS: BURNING
DESCRIPTORS: ACHING
DESCRIPTORS: ACHING
DESCRIPTORS: BURNING

## 2025-03-01 ASSESSMENT — PAIN DESCRIPTION - LOCATION
LOCATION: CHEST
LOCATION: HEAD
LOCATION: CHEST
LOCATION: ABDOMEN;CHEST
LOCATION: CHEST
LOCATION: ABDOMEN;CHEST

## 2025-03-01 ASSESSMENT — PAIN DESCRIPTION - ORIENTATION
ORIENTATION: RIGHT;LOWER
ORIENTATION: LOWER;RIGHT
ORIENTATION: MID
ORIENTATION: RIGHT;LOWER

## 2025-03-01 ASSESSMENT — PAIN DESCRIPTION - ONSET
ONSET: PROGRESSIVE

## 2025-03-01 ASSESSMENT — PAIN SCALES - GENERAL
PAINLEVEL_OUTOF10: 2
PAINLEVEL_OUTOF10: 9
PAINLEVEL_OUTOF10: 3
PAINLEVEL_OUTOF10: 7
PAINLEVEL_OUTOF10: 3
PAINLEVEL_OUTOF10: 2
PAINLEVEL_OUTOF10: 5
PAINLEVEL_OUTOF10: 9
PAINLEVEL_OUTOF10: 2
PAINLEVEL_OUTOF10: 9
PAINLEVEL_OUTOF10: 9

## 2025-03-01 ASSESSMENT — PAIN DESCRIPTION - PAIN TYPE
TYPE: ACUTE PAIN

## 2025-03-01 ASSESSMENT — PAIN DESCRIPTION - FREQUENCY
FREQUENCY: CONTINUOUS

## 2025-03-02 ENCOUNTER — APPOINTMENT (OUTPATIENT)
Dept: GENERAL RADIOLOGY | Age: 60
DRG: 286 | End: 2025-03-02
Payer: COMMERCIAL

## 2025-03-02 LAB
ANION GAP SERPL CALCULATED.3IONS-SCNC: 8 MMOL/L (ref 9–17)
ANTI-XA UNFRAC HEPARIN: 0.21 IU/L (ref 0.3–0.7)
ANTI-XA UNFRAC HEPARIN: 0.22 IU/L (ref 0.3–0.7)
ANTI-XA UNFRAC HEPARIN: 0.34 IU/L (ref 0.3–0.7)
BNP SERPL-MCNC: 820 PG/ML
BUN SERPL-MCNC: 44 MG/DL (ref 6–20)
CALCIUM SERPL-MCNC: 8.8 MG/DL (ref 8.6–10.4)
CHLORIDE SERPL-SCNC: 95 MMOL/L (ref 98–107)
CO2 SERPL-SCNC: 31 MMOL/L (ref 20–31)
CREAT SERPL-MCNC: 1.4 MG/DL (ref 0.7–1.2)
GFR, ESTIMATED: 58 ML/MIN/1.73M2
GLUCOSE BLD-MCNC: 168 MG/DL (ref 75–110)
GLUCOSE BLD-MCNC: 241 MG/DL (ref 75–110)
GLUCOSE BLD-MCNC: 302 MG/DL (ref 75–110)
GLUCOSE BLD-MCNC: 311 MG/DL (ref 75–110)
GLUCOSE SERPL-MCNC: 203 MG/DL (ref 70–99)
POTASSIUM SERPL-SCNC: 4.2 MMOL/L (ref 3.7–5.3)
SODIUM SERPL-SCNC: 134 MMOL/L (ref 135–144)

## 2025-03-02 PROCEDURE — 85520 HEPARIN ASSAY: CPT

## 2025-03-02 PROCEDURE — 6360000002 HC RX W HCPCS: Performed by: HOSPITALIST

## 2025-03-02 PROCEDURE — 36415 COLL VENOUS BLD VENIPUNCTURE: CPT

## 2025-03-02 PROCEDURE — 80048 BASIC METABOLIC PNL TOTAL CA: CPT

## 2025-03-02 PROCEDURE — 82947 ASSAY GLUCOSE BLOOD QUANT: CPT

## 2025-03-02 PROCEDURE — 6360000002 HC RX W HCPCS: Performed by: INTERNAL MEDICINE

## 2025-03-02 PROCEDURE — 6370000000 HC RX 637 (ALT 250 FOR IP): Performed by: NURSE PRACTITIONER

## 2025-03-02 PROCEDURE — 94640 AIRWAY INHALATION TREATMENT: CPT

## 2025-03-02 PROCEDURE — 6360000002 HC RX W HCPCS: Performed by: NURSE PRACTITIONER

## 2025-03-02 PROCEDURE — 83880 ASSAY OF NATRIURETIC PEPTIDE: CPT

## 2025-03-02 PROCEDURE — 6370000000 HC RX 637 (ALT 250 FOR IP): Performed by: HOSPITALIST

## 2025-03-02 PROCEDURE — 71045 X-RAY EXAM CHEST 1 VIEW: CPT

## 2025-03-02 PROCEDURE — 94669 MECHANICAL CHEST WALL OSCILL: CPT

## 2025-03-02 PROCEDURE — 6370000000 HC RX 637 (ALT 250 FOR IP): Performed by: INTERNAL MEDICINE

## 2025-03-02 PROCEDURE — 99232 SBSQ HOSP IP/OBS MODERATE 35: CPT | Performed by: HOSPITALIST

## 2025-03-02 PROCEDURE — 94761 N-INVAS EAR/PLS OXIMETRY MLT: CPT

## 2025-03-02 PROCEDURE — 2500000003 HC RX 250 WO HCPCS: Performed by: STUDENT IN AN ORGANIZED HEALTH CARE EDUCATION/TRAINING PROGRAM

## 2025-03-02 PROCEDURE — 2700000000 HC OXYGEN THERAPY PER DAY

## 2025-03-02 PROCEDURE — 2060000000 HC ICU INTERMEDIATE R&B

## 2025-03-02 PROCEDURE — 99232 SBSQ HOSP IP/OBS MODERATE 35: CPT | Performed by: INTERNAL MEDICINE

## 2025-03-02 PROCEDURE — 6360000002 HC RX W HCPCS: Performed by: STUDENT IN AN ORGANIZED HEALTH CARE EDUCATION/TRAINING PROGRAM

## 2025-03-02 PROCEDURE — 6370000000 HC RX 637 (ALT 250 FOR IP): Performed by: STUDENT IN AN ORGANIZED HEALTH CARE EDUCATION/TRAINING PROGRAM

## 2025-03-02 RX ORDER — ALBUTEROL SULFATE 0.83 MG/ML
2.5 SOLUTION RESPIRATORY (INHALATION)
Status: DISCONTINUED | OUTPATIENT
Start: 2025-03-02 | End: 2025-03-05 | Stop reason: HOSPADM

## 2025-03-02 RX ORDER — ECHINACEA PURPUREA EXTRACT 125 MG
1 TABLET ORAL PRN
Status: DISCONTINUED | OUTPATIENT
Start: 2025-03-02 | End: 2025-03-05 | Stop reason: HOSPADM

## 2025-03-02 RX ORDER — ACETYLCYSTEINE 200 MG/ML
200 SOLUTION ORAL; RESPIRATORY (INHALATION) 2 TIMES DAILY
Status: DISCONTINUED | OUTPATIENT
Start: 2025-03-02 | End: 2025-03-05 | Stop reason: HOSPADM

## 2025-03-02 RX ADMIN — SODIUM CHLORIDE, PRESERVATIVE FREE 10 ML: 5 INJECTION INTRAVENOUS at 07:46

## 2025-03-02 RX ADMIN — INSULIN LISPRO 4 UNITS: 100 INJECTION, SOLUTION INTRAVENOUS; SUBCUTANEOUS at 16:30

## 2025-03-02 RX ADMIN — FUROSEMIDE 40 MG: 10 INJECTION, SOLUTION INTRAMUSCULAR; INTRAVENOUS at 16:31

## 2025-03-02 RX ADMIN — ACETYLCYSTEINE 200 MG: 200 SOLUTION ORAL; RESPIRATORY (INHALATION) at 07:50

## 2025-03-02 RX ADMIN — EMPAGLIFLOZIN 10 MG: 10 TABLET, FILM COATED ORAL at 07:46

## 2025-03-02 RX ADMIN — HEPARIN SODIUM 15 UNITS/KG/HR: 10000 INJECTION, SOLUTION INTRAVENOUS at 13:55

## 2025-03-02 RX ADMIN — ATORVASTATIN CALCIUM 20 MG: 20 TABLET, FILM COATED ORAL at 07:46

## 2025-03-02 RX ADMIN — ALBUTEROL SULFATE 2.5 MG: 2.5 SOLUTION RESPIRATORY (INHALATION) at 20:51

## 2025-03-02 RX ADMIN — SPIRONOLACTONE 25 MG: 25 TABLET, FILM COATED ORAL at 07:45

## 2025-03-02 RX ADMIN — ALBUTEROL SULFATE 2.5 MG: 2.5 SOLUTION RESPIRATORY (INHALATION) at 07:50

## 2025-03-02 RX ADMIN — CARVEDILOL 25 MG: 12.5 TABLET, FILM COATED ORAL at 16:31

## 2025-03-02 RX ADMIN — HYDROMORPHONE HYDROCHLORIDE 1 MG: 1 INJECTION, SOLUTION INTRAMUSCULAR; INTRAVENOUS; SUBCUTANEOUS at 10:26

## 2025-03-02 RX ADMIN — TRAMADOL HYDROCHLORIDE 25 MG: 50 TABLET, COATED ORAL at 08:42

## 2025-03-02 RX ADMIN — INSULIN LISPRO 12 UNITS: 100 INJECTION, SOLUTION INTRAVENOUS; SUBCUTANEOUS at 21:28

## 2025-03-02 RX ADMIN — GUAIFENESIN 1200 MG: 600 TABLET, EXTENDED RELEASE ORAL at 21:28

## 2025-03-02 RX ADMIN — ALBUTEROL SULFATE 2.5 MG: 2.5 SOLUTION RESPIRATORY (INHALATION) at 11:34

## 2025-03-02 RX ADMIN — HYDROMORPHONE HYDROCHLORIDE 1 MG: 1 INJECTION, SOLUTION INTRAMUSCULAR; INTRAVENOUS; SUBCUTANEOUS at 18:37

## 2025-03-02 RX ADMIN — BUDESONIDE AND FORMOTEROL FUMARATE DIHYDRATE 2 PUFF: 160; 4.5 AEROSOL RESPIRATORY (INHALATION) at 20:51

## 2025-03-02 RX ADMIN — HEPARIN SODIUM 3900 UNITS: 1000 INJECTION INTRAVENOUS; SUBCUTANEOUS at 17:44

## 2025-03-02 RX ADMIN — HYDROMORPHONE HYDROCHLORIDE 1 MG: 1 INJECTION, SOLUTION INTRAMUSCULAR; INTRAVENOUS; SUBCUTANEOUS at 03:00

## 2025-03-02 RX ADMIN — SODIUM CHLORIDE, PRESERVATIVE FREE 10 ML: 5 INJECTION INTRAVENOUS at 18:39

## 2025-03-02 RX ADMIN — CARVEDILOL 25 MG: 12.5 TABLET, FILM COATED ORAL at 07:45

## 2025-03-02 RX ADMIN — ASPIRIN 81 MG: 81 TABLET, CHEWABLE ORAL at 07:46

## 2025-03-02 RX ADMIN — INSULIN LISPRO 12 UNITS: 100 INJECTION, SOLUTION INTRAVENOUS; SUBCUTANEOUS at 11:05

## 2025-03-02 RX ADMIN — SALINE NASAL SPRAY 1 SPRAY: 1.5 SOLUTION NASAL at 21:28

## 2025-03-02 RX ADMIN — GUAIFENESIN 1200 MG: 600 TABLET, EXTENDED RELEASE ORAL at 07:46

## 2025-03-02 RX ADMIN — SODIUM CHLORIDE, PRESERVATIVE FREE 10 ML: 5 INJECTION INTRAVENOUS at 10:28

## 2025-03-02 RX ADMIN — TRAMADOL HYDROCHLORIDE 25 MG: 50 TABLET, COATED ORAL at 21:37

## 2025-03-02 RX ADMIN — ACETYLCYSTEINE 200 MG: 200 SOLUTION ORAL; RESPIRATORY (INHALATION) at 11:34

## 2025-03-02 RX ADMIN — SALINE NASAL SPRAY 1 SPRAY: 1.5 SOLUTION NASAL at 06:45

## 2025-03-02 RX ADMIN — FUROSEMIDE 40 MG: 10 INJECTION, SOLUTION INTRAMUSCULAR; INTRAVENOUS at 07:46

## 2025-03-02 RX ADMIN — BUDESONIDE AND FORMOTEROL FUMARATE DIHYDRATE 2 PUFF: 160; 4.5 AEROSOL RESPIRATORY (INHALATION) at 07:50

## 2025-03-02 RX ADMIN — HEPARIN SODIUM 3900 UNITS: 1000 INJECTION INTRAVENOUS; SUBCUTANEOUS at 03:35

## 2025-03-02 RX ADMIN — ACETYLCYSTEINE 200 MG: 200 SOLUTION ORAL; RESPIRATORY (INHALATION) at 20:51

## 2025-03-02 ASSESSMENT — PAIN DESCRIPTION - ORIENTATION
ORIENTATION: RIGHT
ORIENTATION: RIGHT;UPPER
ORIENTATION: RIGHT
ORIENTATION: RIGHT;UPPER

## 2025-03-02 ASSESSMENT — PAIN SCALES - GENERAL
PAINLEVEL_OUTOF10: 3
PAINLEVEL_OUTOF10: 9
PAINLEVEL_OUTOF10: 7
PAINLEVEL_OUTOF10: 7
PAINLEVEL_OUTOF10: 2
PAINLEVEL_OUTOF10: 6
PAINLEVEL_OUTOF10: 2
PAINLEVEL_OUTOF10: 8
PAINLEVEL_OUTOF10: 4
PAINLEVEL_OUTOF10: 3
PAINLEVEL_OUTOF10: 10

## 2025-03-02 ASSESSMENT — PAIN DESCRIPTION - LOCATION
LOCATION: CHEST
LOCATION: ABDOMEN;CHEST
LOCATION: CHEST
LOCATION: ABDOMEN;CHEST

## 2025-03-02 ASSESSMENT — PAIN DESCRIPTION - ONSET: ONSET: PROGRESSIVE

## 2025-03-02 ASSESSMENT — PAIN DESCRIPTION - DESCRIPTORS
DESCRIPTORS: SHARP;BURNING
DESCRIPTORS: BURNING
DESCRIPTORS: BURNING

## 2025-03-02 ASSESSMENT — PAIN DESCRIPTION - FREQUENCY: FREQUENCY: CONTINUOUS

## 2025-03-02 ASSESSMENT — PAIN DESCRIPTION - PAIN TYPE: TYPE: ACUTE PAIN

## 2025-03-03 LAB
ANION GAP SERPL CALCULATED.3IONS-SCNC: 10 MMOL/L (ref 9–17)
ANTI-XA UNFRAC HEPARIN: 0.23 IU/L (ref 0.3–0.7)
ANTI-XA UNFRAC HEPARIN: 0.47 IU/L (ref 0.3–0.7)
BUN SERPL-MCNC: 47 MG/DL (ref 6–20)
CALCIUM SERPL-MCNC: 8.8 MG/DL (ref 8.6–10.4)
CHLORIDE SERPL-SCNC: 97 MMOL/L (ref 98–107)
CO2 SERPL-SCNC: 30 MMOL/L (ref 20–31)
CREAT SERPL-MCNC: 1.5 MG/DL (ref 0.7–1.2)
ERYTHROCYTE [DISTWIDTH] IN BLOOD BY AUTOMATED COUNT: 12.7 % (ref 12.5–15.4)
GFR, ESTIMATED: 53 ML/MIN/1.73M2
GLUCOSE BLD-MCNC: 186 MG/DL (ref 75–110)
GLUCOSE BLD-MCNC: 284 MG/DL (ref 75–110)
GLUCOSE SERPL-MCNC: 186 MG/DL (ref 70–99)
HCT VFR BLD AUTO: 30.7 % (ref 41–53)
HGB BLD-MCNC: 10.3 G/DL (ref 13.5–17.5)
MAGNESIUM SERPL-MCNC: 1.9 MG/DL (ref 1.6–2.6)
MCH RBC QN AUTO: 30.7 PG (ref 26–34)
MCHC RBC AUTO-ENTMCNC: 33.6 G/DL (ref 31–37)
MCV RBC AUTO: 91.2 FL (ref 80–100)
PLATELET # BLD AUTO: 261 K/UL (ref 140–450)
PMV BLD AUTO: 8.2 FL (ref 6–12)
POTASSIUM SERPL-SCNC: 4.7 MMOL/L (ref 3.7–5.3)
RBC # BLD AUTO: 3.37 M/UL (ref 4.5–5.9)
SODIUM SERPL-SCNC: 137 MMOL/L (ref 135–144)
T4 FREE SERPL-MCNC: 0.9 NG/DL (ref 0.92–1.68)
TSH SERPL DL<=0.05 MIU/L-ACNC: 14.27 UIU/ML (ref 0.3–5)
WBC OTHER # BLD: 11.5 K/UL (ref 3.5–11)

## 2025-03-03 PROCEDURE — 6370000000 HC RX 637 (ALT 250 FOR IP): Performed by: NURSE PRACTITIONER

## 2025-03-03 PROCEDURE — C1894 INTRO/SHEATH, NON-LASER: HCPCS | Performed by: INTERNAL MEDICINE

## 2025-03-03 PROCEDURE — 2580000003 HC RX 258: Performed by: INTERNAL MEDICINE

## 2025-03-03 PROCEDURE — 82947 ASSAY GLUCOSE BLOOD QUANT: CPT

## 2025-03-03 PROCEDURE — 7100000010 HC PHASE II RECOVERY - FIRST 15 MIN: Performed by: INTERNAL MEDICINE

## 2025-03-03 PROCEDURE — 2500000003 HC RX 250 WO HCPCS: Performed by: INTERNAL MEDICINE

## 2025-03-03 PROCEDURE — 2060000000 HC ICU INTERMEDIATE R&B

## 2025-03-03 PROCEDURE — 36415 COLL VENOUS BLD VENIPUNCTURE: CPT

## 2025-03-03 PROCEDURE — 6370000000 HC RX 637 (ALT 250 FOR IP): Performed by: INTERNAL MEDICINE

## 2025-03-03 PROCEDURE — 99232 SBSQ HOSP IP/OBS MODERATE 35: CPT | Performed by: HOSPITALIST

## 2025-03-03 PROCEDURE — 6360000002 HC RX W HCPCS: Performed by: INTERNAL MEDICINE

## 2025-03-03 PROCEDURE — 2709999900 HC NON-CHARGEABLE SUPPLY: Performed by: INTERNAL MEDICINE

## 2025-03-03 PROCEDURE — 94660 CPAP INITIATION&MGMT: CPT

## 2025-03-03 PROCEDURE — 2700000000 HC OXYGEN THERAPY PER DAY

## 2025-03-03 PROCEDURE — 80048 BASIC METABOLIC PNL TOTAL CA: CPT

## 2025-03-03 PROCEDURE — 84443 ASSAY THYROID STIM HORMONE: CPT

## 2025-03-03 PROCEDURE — 85520 HEPARIN ASSAY: CPT

## 2025-03-03 PROCEDURE — 99152 MOD SED SAME PHYS/QHP 5/>YRS: CPT | Performed by: INTERNAL MEDICINE

## 2025-03-03 PROCEDURE — 94761 N-INVAS EAR/PLS OXIMETRY MLT: CPT

## 2025-03-03 PROCEDURE — 6360000002 HC RX W HCPCS: Performed by: NURSE PRACTITIONER

## 2025-03-03 PROCEDURE — 6360000004 HC RX CONTRAST MEDICATION: Performed by: INTERNAL MEDICINE

## 2025-03-03 PROCEDURE — 97116 GAIT TRAINING THERAPY: CPT

## 2025-03-03 PROCEDURE — 85027 COMPLETE CBC AUTOMATED: CPT

## 2025-03-03 PROCEDURE — 84439 ASSAY OF FREE THYROXINE: CPT

## 2025-03-03 PROCEDURE — C1769 GUIDE WIRE: HCPCS | Performed by: INTERNAL MEDICINE

## 2025-03-03 PROCEDURE — 94640 AIRWAY INHALATION TREATMENT: CPT

## 2025-03-03 PROCEDURE — 93458 L HRT ARTERY/VENTRICLE ANGIO: CPT | Performed by: INTERNAL MEDICINE

## 2025-03-03 PROCEDURE — 6370000000 HC RX 637 (ALT 250 FOR IP): Performed by: HOSPITALIST

## 2025-03-03 PROCEDURE — 7100000011 HC PHASE II RECOVERY - ADDTL 15 MIN: Performed by: INTERNAL MEDICINE

## 2025-03-03 PROCEDURE — 83735 ASSAY OF MAGNESIUM: CPT

## 2025-03-03 PROCEDURE — 4A023N7 MEASUREMENT OF CARDIAC SAMPLING AND PRESSURE, LEFT HEART, PERCUTANEOUS APPROACH: ICD-10-PCS | Performed by: INTERNAL MEDICINE

## 2025-03-03 PROCEDURE — B2111ZZ FLUOROSCOPY OF MULTIPLE CORONARY ARTERIES USING LOW OSMOLAR CONTRAST: ICD-10-PCS | Performed by: INTERNAL MEDICINE

## 2025-03-03 PROCEDURE — 6360000002 HC RX W HCPCS: Performed by: HOSPITALIST

## 2025-03-03 PROCEDURE — 2500000003 HC RX 250 WO HCPCS: Performed by: STUDENT IN AN ORGANIZED HEALTH CARE EDUCATION/TRAINING PROGRAM

## 2025-03-03 RX ORDER — IOPAMIDOL 755 MG/ML
INJECTION, SOLUTION INTRAVASCULAR PRN
Status: DISCONTINUED | OUTPATIENT
Start: 2025-03-03 | End: 2025-03-03 | Stop reason: HOSPADM

## 2025-03-03 RX ORDER — SODIUM CHLORIDE 9 MG/ML
INJECTION, SOLUTION INTRAVENOUS PRN
Status: DISCONTINUED | OUTPATIENT
Start: 2025-03-03 | End: 2025-03-05 | Stop reason: HOSPADM

## 2025-03-03 RX ORDER — AMLODIPINE BESYLATE 10 MG/1
10 TABLET ORAL DAILY
Status: DISCONTINUED | OUTPATIENT
Start: 2025-03-03 | End: 2025-03-05 | Stop reason: HOSPADM

## 2025-03-03 RX ORDER — LIDOCAINE HYDROCHLORIDE 10 MG/ML
INJECTION, SOLUTION INFILTRATION; PERINEURAL PRN
Status: DISCONTINUED | OUTPATIENT
Start: 2025-03-03 | End: 2025-03-03 | Stop reason: HOSPADM

## 2025-03-03 RX ORDER — SODIUM CHLORIDE 9 MG/ML
INJECTION, SOLUTION INTRAVENOUS CONTINUOUS
Status: ACTIVE | OUTPATIENT
Start: 2025-03-03 | End: 2025-03-03

## 2025-03-03 RX ORDER — 0.9 % SODIUM CHLORIDE 0.9 %
INTRAVENOUS SOLUTION INTRAVENOUS CONTINUOUS PRN
Status: COMPLETED | OUTPATIENT
Start: 2025-03-03 | End: 2025-03-03

## 2025-03-03 RX ORDER — SODIUM CHLORIDE 0.9 % (FLUSH) 0.9 %
5-40 SYRINGE (ML) INJECTION EVERY 12 HOURS SCHEDULED
Status: DISCONTINUED | OUTPATIENT
Start: 2025-03-03 | End: 2025-03-05 | Stop reason: HOSPADM

## 2025-03-03 RX ORDER — SODIUM CHLORIDE 0.9 % (FLUSH) 0.9 %
5-40 SYRINGE (ML) INJECTION PRN
Status: DISCONTINUED | OUTPATIENT
Start: 2025-03-03 | End: 2025-03-05 | Stop reason: HOSPADM

## 2025-03-03 RX ORDER — MIDAZOLAM 1 MG/ML
INJECTION INTRAMUSCULAR; INTRAVENOUS PRN
Status: DISCONTINUED | OUTPATIENT
Start: 2025-03-03 | End: 2025-03-03 | Stop reason: HOSPADM

## 2025-03-03 RX ORDER — TRAMADOL HYDROCHLORIDE 50 MG/1
50 TABLET ORAL EVERY 4 HOURS PRN
Status: DISCONTINUED | OUTPATIENT
Start: 2025-03-03 | End: 2025-03-05 | Stop reason: HOSPADM

## 2025-03-03 RX ADMIN — SODIUM CHLORIDE, PRESERVATIVE FREE 10 ML: 5 INJECTION INTRAVENOUS at 21:04

## 2025-03-03 RX ADMIN — ALBUTEROL SULFATE 2.5 MG: 2.5 SOLUTION RESPIRATORY (INHALATION) at 11:22

## 2025-03-03 RX ADMIN — GUAIFENESIN 1200 MG: 600 TABLET, EXTENDED RELEASE ORAL at 21:04

## 2025-03-03 RX ADMIN — INSULIN LISPRO 8 UNITS: 100 INJECTION, SOLUTION INTRAVENOUS; SUBCUTANEOUS at 21:31

## 2025-03-03 RX ADMIN — HYDROMORPHONE HYDROCHLORIDE 1 MG: 1 INJECTION, SOLUTION INTRAMUSCULAR; INTRAVENOUS; SUBCUTANEOUS at 02:52

## 2025-03-03 RX ADMIN — ALBUTEROL SULFATE 2.5 MG: 2.5 SOLUTION RESPIRATORY (INHALATION) at 15:12

## 2025-03-03 RX ADMIN — ALBUTEROL SULFATE 2.5 MG: 2.5 SOLUTION RESPIRATORY (INHALATION) at 20:02

## 2025-03-03 RX ADMIN — BUDESONIDE AND FORMOTEROL FUMARATE DIHYDRATE 2 PUFF: 160; 4.5 AEROSOL RESPIRATORY (INHALATION) at 20:02

## 2025-03-03 RX ADMIN — HEPARIN SODIUM 3900 UNITS: 1000 INJECTION INTRAVENOUS; SUBCUTANEOUS at 03:59

## 2025-03-03 RX ADMIN — INSULIN LISPRO 4 UNITS: 100 INJECTION, SOLUTION INTRAVENOUS; SUBCUTANEOUS at 16:54

## 2025-03-03 RX ADMIN — ACETYLCYSTEINE 200 MG: 200 SOLUTION ORAL; RESPIRATORY (INHALATION) at 20:03

## 2025-03-03 RX ADMIN — CARVEDILOL 25 MG: 12.5 TABLET, FILM COATED ORAL at 16:54

## 2025-03-03 RX ADMIN — ALBUTEROL SULFATE 2.5 MG: 2.5 SOLUTION RESPIRATORY (INHALATION) at 07:57

## 2025-03-03 RX ADMIN — HEPARIN SODIUM 19 UNITS/KG/HR: 10000 INJECTION, SOLUTION INTRAVENOUS at 06:15

## 2025-03-03 RX ADMIN — BUDESONIDE AND FORMOTEROL FUMARATE DIHYDRATE 2 PUFF: 160; 4.5 AEROSOL RESPIRATORY (INHALATION) at 07:57

## 2025-03-03 RX ADMIN — SODIUM CHLORIDE, PRESERVATIVE FREE 10 ML: 5 INJECTION INTRAVENOUS at 21:33

## 2025-03-03 RX ADMIN — ASPIRIN 81 MG: 81 TABLET, CHEWABLE ORAL at 10:59

## 2025-03-03 RX ADMIN — HYDROMORPHONE HYDROCHLORIDE 1 MG: 1 INJECTION, SOLUTION INTRAMUSCULAR; INTRAVENOUS; SUBCUTANEOUS at 14:42

## 2025-03-03 RX ADMIN — ACETYLCYSTEINE 200 MG: 200 SOLUTION ORAL; RESPIRATORY (INHALATION) at 07:57

## 2025-03-03 RX ADMIN — SODIUM CHLORIDE: 0.9 INJECTION, SOLUTION INTRAVENOUS at 13:14

## 2025-03-03 RX ADMIN — AMLODIPINE BESYLATE 10 MG: 10 TABLET ORAL at 14:42

## 2025-03-03 RX ADMIN — APIXABAN 5 MG: 5 TABLET, FILM COATED ORAL at 21:04

## 2025-03-03 RX ADMIN — TRAMADOL HYDROCHLORIDE 50 MG: 50 TABLET, COATED ORAL at 21:32

## 2025-03-03 ASSESSMENT — PAIN DESCRIPTION - DESCRIPTORS
DESCRIPTORS: BURNING
DESCRIPTORS: BURNING
DESCRIPTORS: ACHING

## 2025-03-03 ASSESSMENT — PAIN SCALES - GENERAL
PAINLEVEL_OUTOF10: 7
PAINLEVEL_OUTOF10: 4
PAINLEVEL_OUTOF10: 8
PAINLEVEL_OUTOF10: 9
PAINLEVEL_OUTOF10: 8
PAINLEVEL_OUTOF10: 2

## 2025-03-03 ASSESSMENT — PAIN DESCRIPTION - ORIENTATION
ORIENTATION: RIGHT
ORIENTATION: RIGHT
ORIENTATION: RIGHT;MID

## 2025-03-03 ASSESSMENT — PAIN DESCRIPTION - PAIN TYPE
TYPE: CHRONIC PAIN
TYPE: CHRONIC PAIN
TYPE: ACUTE PAIN

## 2025-03-03 ASSESSMENT — PAIN DESCRIPTION - FREQUENCY
FREQUENCY: CONTINUOUS
FREQUENCY: CONTINUOUS

## 2025-03-03 ASSESSMENT — PAIN DESCRIPTION - ONSET
ONSET: PROGRESSIVE
ONSET: PROGRESSIVE
ONSET: ON-GOING

## 2025-03-03 ASSESSMENT — PAIN DESCRIPTION - LOCATION
LOCATION: ABDOMEN
LOCATION: RIB CAGE
LOCATION: OTHER (COMMENT)

## 2025-03-03 NOTE — BRIEF OP NOTE
Brief Postoperative Note      Patient: Kiel Day  YOB: 1965  MRN: [id number]    Date of Procedure: 3/3/2025    Pre-Op Diagnosis Codes:      * NSTEMI (non-ST elevated myocardial infarction) (HCC) [I21.4]    Post-Op Diagnosis: Same       Procedure(s):  Left heart cath / coronary angiography    Surgeon(s):  Reed Robert MD    Assistant:  * No surgical staff found *    Anesthesia: IV Sedation    Estimated Blood Loss (mL): Minimal    Complications: None    Drains: * No LDAs found *    Findings:    Mild nonobstructive CAD  Normal LVEDP  LV gram not done due to renal  insufficiency   Total contrast used 40 cc    Plan:     Optimize medical management  Start eliquis from tonight   Gentle IV hydration for 4 hours  Recheck BMP in the morning  Okay to discharge from cardiology perspective tomorrow as long as creatinine remains stable        Electronically signed by Reed Robert MD on 3/3/2025 at 12:42 PM

## 2025-03-03 NOTE — CARE COORDINATION
[facility] Quality Flow/Interdisciplinary Rounds Progress Note    Quality Flow Rounds held on March 3, 2025 at 0930    Disciplines Attending:  Bedside Nurse, , and Nursing Unit Leadership    Barriers to Discharge: clinical status    Anticipated Discharge Date:   3/5/25    Anticipated Discharge Disposition: Home    [facility] RISK OF UNPLANNED READMISSION 2.0             20 Total Score        Discussed patient goal for the day, patient clinical progression, and barriers to discharge.  Possible cardiac cath today. Patient plans to return home at discharge and is current with [employer] for 2L NC home O2. Patient currently on 3L and will need updated orders and home O2 eval prior to discharge.      Bella Gonzales RN  March 3, 2025

## 2025-03-04 ENCOUNTER — APPOINTMENT (OUTPATIENT)
Dept: CT IMAGING | Age: 60
DRG: 286 | End: 2025-03-04
Payer: COMMERCIAL

## 2025-03-04 LAB
ANION GAP SERPL CALCULATED.3IONS-SCNC: 8 MMOL/L (ref 9–17)
BUN SERPL-MCNC: 40 MG/DL (ref 6–20)
CALCIUM SERPL-MCNC: 8.6 MG/DL (ref 8.6–10.4)
CHLORIDE SERPL-SCNC: 99 MMOL/L (ref 98–107)
CO2 SERPL-SCNC: 31 MMOL/L (ref 20–31)
CREAT SERPL-MCNC: 1.2 MG/DL (ref 0.7–1.2)
ECHO BSA: 2.21 M2
GFR, ESTIMATED: 70 ML/MIN/1.73M2
GLUCOSE BLD-MCNC: 168 MG/DL (ref 75–110)
GLUCOSE BLD-MCNC: 233 MG/DL (ref 75–110)
GLUCOSE BLD-MCNC: 255 MG/DL (ref 75–110)
GLUCOSE BLD-MCNC: 322 MG/DL (ref 75–110)
GLUCOSE SERPL-MCNC: 201 MG/DL (ref 70–99)
POTASSIUM SERPL-SCNC: 4.9 MMOL/L (ref 3.7–5.3)
SODIUM SERPL-SCNC: 138 MMOL/L (ref 135–144)

## 2025-03-04 PROCEDURE — 6370000000 HC RX 637 (ALT 250 FOR IP): Performed by: NURSE PRACTITIONER

## 2025-03-04 PROCEDURE — 99232 SBSQ HOSP IP/OBS MODERATE 35: CPT | Performed by: INTERNAL MEDICINE

## 2025-03-04 PROCEDURE — 6360000002 HC RX W HCPCS: Performed by: STUDENT IN AN ORGANIZED HEALTH CARE EDUCATION/TRAINING PROGRAM

## 2025-03-04 PROCEDURE — 2500000003 HC RX 250 WO HCPCS: Performed by: STUDENT IN AN ORGANIZED HEALTH CARE EDUCATION/TRAINING PROGRAM

## 2025-03-04 PROCEDURE — 94669 MECHANICAL CHEST WALL OSCILL: CPT

## 2025-03-04 PROCEDURE — 2060000000 HC ICU INTERMEDIATE R&B

## 2025-03-04 PROCEDURE — 6370000000 HC RX 637 (ALT 250 FOR IP): Performed by: INTERNAL MEDICINE

## 2025-03-04 PROCEDURE — 6370000000 HC RX 637 (ALT 250 FOR IP): Performed by: HOSPITALIST

## 2025-03-04 PROCEDURE — 94660 CPAP INITIATION&MGMT: CPT

## 2025-03-04 PROCEDURE — 99232 SBSQ HOSP IP/OBS MODERATE 35: CPT | Performed by: STUDENT IN AN ORGANIZED HEALTH CARE EDUCATION/TRAINING PROGRAM

## 2025-03-04 PROCEDURE — 6370000000 HC RX 637 (ALT 250 FOR IP): Performed by: STUDENT IN AN ORGANIZED HEALTH CARE EDUCATION/TRAINING PROGRAM

## 2025-03-04 PROCEDURE — 80048 BASIC METABOLIC PNL TOTAL CA: CPT

## 2025-03-04 PROCEDURE — 97116 GAIT TRAINING THERAPY: CPT

## 2025-03-04 PROCEDURE — 2500000003 HC RX 250 WO HCPCS: Performed by: INTERNAL MEDICINE

## 2025-03-04 PROCEDURE — 82947 ASSAY GLUCOSE BLOOD QUANT: CPT

## 2025-03-04 PROCEDURE — 74176 CT ABD & PELVIS W/O CONTRAST: CPT

## 2025-03-04 PROCEDURE — 94761 N-INVAS EAR/PLS OXIMETRY MLT: CPT

## 2025-03-04 PROCEDURE — 94640 AIRWAY INHALATION TREATMENT: CPT

## 2025-03-04 PROCEDURE — 2700000000 HC OXYGEN THERAPY PER DAY

## 2025-03-04 PROCEDURE — 36415 COLL VENOUS BLD VENIPUNCTURE: CPT

## 2025-03-04 PROCEDURE — 6360000002 HC RX W HCPCS: Performed by: NURSE PRACTITIONER

## 2025-03-04 RX ORDER — FUROSEMIDE 20 MG/1
40 TABLET ORAL 2 TIMES DAILY
Status: DISCONTINUED | OUTPATIENT
Start: 2025-03-04 | End: 2025-03-05 | Stop reason: HOSPADM

## 2025-03-04 RX ADMIN — BUDESONIDE AND FORMOTEROL FUMARATE DIHYDRATE 2 PUFF: 160; 4.5 AEROSOL RESPIRATORY (INHALATION) at 09:02

## 2025-03-04 RX ADMIN — TRAMADOL HYDROCHLORIDE 50 MG: 50 TABLET, COATED ORAL at 08:50

## 2025-03-04 RX ADMIN — FUROSEMIDE 40 MG: 20 TABLET ORAL at 08:46

## 2025-03-04 RX ADMIN — CARVEDILOL 25 MG: 12.5 TABLET, FILM COATED ORAL at 17:33

## 2025-03-04 RX ADMIN — INSULIN LISPRO 12 UNITS: 100 INJECTION, SOLUTION INTRAVENOUS; SUBCUTANEOUS at 12:09

## 2025-03-04 RX ADMIN — GUAIFENESIN 1200 MG: 600 TABLET, EXTENDED RELEASE ORAL at 08:46

## 2025-03-04 RX ADMIN — TRAMADOL HYDROCHLORIDE 50 MG: 50 TABLET, COATED ORAL at 22:19

## 2025-03-04 RX ADMIN — BUDESONIDE AND FORMOTEROL FUMARATE DIHYDRATE 2 PUFF: 160; 4.5 AEROSOL RESPIRATORY (INHALATION) at 20:11

## 2025-03-04 RX ADMIN — ASPIRIN 81 MG: 81 TABLET, CHEWABLE ORAL at 08:45

## 2025-03-04 RX ADMIN — AMLODIPINE BESYLATE 10 MG: 10 TABLET ORAL at 08:46

## 2025-03-04 RX ADMIN — CARVEDILOL 25 MG: 12.5 TABLET, FILM COATED ORAL at 08:45

## 2025-03-04 RX ADMIN — APIXABAN 5 MG: 5 TABLET, FILM COATED ORAL at 22:20

## 2025-03-04 RX ADMIN — INSULIN LISPRO 8 UNITS: 100 INJECTION, SOLUTION INTRAVENOUS; SUBCUTANEOUS at 17:30

## 2025-03-04 RX ADMIN — APIXABAN 5 MG: 5 TABLET, FILM COATED ORAL at 08:46

## 2025-03-04 RX ADMIN — ACETYLCYSTEINE 200 MG: 200 SOLUTION ORAL; RESPIRATORY (INHALATION) at 09:02

## 2025-03-04 RX ADMIN — INSULIN LISPRO 2 UNITS: 100 INJECTION, SOLUTION INTRAVENOUS; SUBCUTANEOUS at 22:20

## 2025-03-04 RX ADMIN — ALBUTEROL SULFATE 2.5 MG: 2.5 SOLUTION RESPIRATORY (INHALATION) at 16:25

## 2025-03-04 RX ADMIN — SODIUM CHLORIDE, PRESERVATIVE FREE 10 ML: 5 INJECTION INTRAVENOUS at 08:45

## 2025-03-04 RX ADMIN — ATORVASTATIN CALCIUM 20 MG: 20 TABLET, FILM COATED ORAL at 08:46

## 2025-03-04 RX ADMIN — SPIRONOLACTONE 25 MG: 25 TABLET, FILM COATED ORAL at 08:46

## 2025-03-04 RX ADMIN — GUAIFENESIN 1200 MG: 600 TABLET, EXTENDED RELEASE ORAL at 22:20

## 2025-03-04 RX ADMIN — SODIUM CHLORIDE, PRESERVATIVE FREE 10 ML: 5 INJECTION INTRAVENOUS at 22:20

## 2025-03-04 RX ADMIN — FUROSEMIDE 40 MG: 20 TABLET ORAL at 17:33

## 2025-03-04 RX ADMIN — ALBUTEROL SULFATE 2.5 MG: 2.5 SOLUTION RESPIRATORY (INHALATION) at 20:11

## 2025-03-04 RX ADMIN — ACETYLCYSTEINE 200 MG: 200 SOLUTION ORAL; RESPIRATORY (INHALATION) at 20:11

## 2025-03-04 RX ADMIN — EMPAGLIFLOZIN 10 MG: 10 TABLET, FILM COATED ORAL at 08:46

## 2025-03-04 RX ADMIN — HYDRALAZINE HYDROCHLORIDE 5 MG: 20 INJECTION INTRAMUSCULAR; INTRAVENOUS at 06:05

## 2025-03-04 RX ADMIN — SODIUM CHLORIDE, PRESERVATIVE FREE 10 ML: 5 INJECTION INTRAVENOUS at 08:46

## 2025-03-04 RX ADMIN — ALBUTEROL SULFATE 2.5 MG: 2.5 SOLUTION RESPIRATORY (INHALATION) at 09:01

## 2025-03-04 RX ADMIN — POLYETHYLENE GLYCOL 3350 17 G: 17 POWDER, FOR SOLUTION ORAL at 17:41

## 2025-03-04 RX ADMIN — ALBUTEROL SULFATE 2.5 MG: 2.5 SOLUTION RESPIRATORY (INHALATION) at 12:33

## 2025-03-04 ASSESSMENT — PAIN - FUNCTIONAL ASSESSMENT: PAIN_FUNCTIONAL_ASSESSMENT: ACTIVITIES ARE NOT PREVENTED

## 2025-03-04 ASSESSMENT — PAIN DESCRIPTION - ORIENTATION
ORIENTATION: RIGHT
ORIENTATION: RIGHT

## 2025-03-04 ASSESSMENT — PAIN SCALES - GENERAL
PAINLEVEL_OUTOF10: 6
PAINLEVEL_OUTOF10: 5
PAINLEVEL_OUTOF10: 2
PAINLEVEL_OUTOF10: 7

## 2025-03-04 ASSESSMENT — PAIN DESCRIPTION - LOCATION
LOCATION: FLANK
LOCATION: ABDOMEN

## 2025-03-04 ASSESSMENT — PAIN DESCRIPTION - DESCRIPTORS
DESCRIPTORS: SHARP
DESCRIPTORS: ACHING

## 2025-03-04 NOTE — CARE COORDINATION
[facility] Quality Flow/Interdisciplinary Rounds Progress Note    Quality Flow Rounds held on March 4, 2025 at 0930    Disciplines Attending:  Bedside Nurse, , and Nursing Unit Leadership    Barriers to Discharge: clinical status    Anticipated Discharge Date:   3/5/25    Anticipated Discharge Disposition: Home     [facility] RISK OF UNPLANNED READMISSION 2.0             18.6 Total Score        Discussed patient goal for the day, patient clinical progression, and barriers to discharge.   Patient s/p cardiac cath. Plan for CT abd today. Patient plans to return home independently at discharge. Currently on 4.5L NC and only wears 2L at home. Home O2 eval and updated O2 orders needed at discharge. Patient will need Eliquis savings card at discharge.      Bella Gonzales RN  March 4, 2025

## 2025-03-05 ENCOUNTER — APPOINTMENT (OUTPATIENT)
Dept: GENERAL RADIOLOGY | Age: 60
DRG: 286 | End: 2025-03-05
Payer: COMMERCIAL

## 2025-03-05 VITALS
SYSTOLIC BLOOD PRESSURE: 153 MMHG | TEMPERATURE: 97.9 F | HEART RATE: 71 BPM | BODY MASS INDEX: 29.32 KG/M2 | HEIGHT: 71 IN | RESPIRATION RATE: 16 BRPM | OXYGEN SATURATION: 89 % | DIASTOLIC BLOOD PRESSURE: 60 MMHG | WEIGHT: 209.44 LBS

## 2025-03-05 LAB
ERYTHROCYTE [DISTWIDTH] IN BLOOD BY AUTOMATED COUNT: 12.6 % (ref 12.5–15.4)
GLUCOSE BLD-MCNC: 150 MG/DL (ref 75–110)
GLUCOSE BLD-MCNC: 231 MG/DL (ref 75–110)
HCT VFR BLD AUTO: 29.7 % (ref 41–53)
HGB BLD-MCNC: 10.2 G/DL (ref 13.5–17.5)
MCH RBC QN AUTO: 31.6 PG (ref 26–34)
MCHC RBC AUTO-ENTMCNC: 34.5 G/DL (ref 31–37)
MCV RBC AUTO: 91.7 FL (ref 80–100)
PLATELET # BLD AUTO: 245 K/UL (ref 140–450)
PMV BLD AUTO: 8.1 FL (ref 6–12)
RBC # BLD AUTO: 3.24 M/UL (ref 4.5–5.9)
WBC OTHER # BLD: 9.4 K/UL (ref 3.5–11)

## 2025-03-05 PROCEDURE — 99238 HOSP IP/OBS DSCHRG MGMT 30/<: CPT | Performed by: STUDENT IN AN ORGANIZED HEALTH CARE EDUCATION/TRAINING PROGRAM

## 2025-03-05 PROCEDURE — 6370000000 HC RX 637 (ALT 250 FOR IP): Performed by: HOSPITALIST

## 2025-03-05 PROCEDURE — 6360000002 HC RX W HCPCS: Performed by: NURSE PRACTITIONER

## 2025-03-05 PROCEDURE — 6370000000 HC RX 637 (ALT 250 FOR IP): Performed by: INTERNAL MEDICINE

## 2025-03-05 PROCEDURE — 71045 X-RAY EXAM CHEST 1 VIEW: CPT

## 2025-03-05 PROCEDURE — 2700000000 HC OXYGEN THERAPY PER DAY

## 2025-03-05 PROCEDURE — 2500000003 HC RX 250 WO HCPCS: Performed by: STUDENT IN AN ORGANIZED HEALTH CARE EDUCATION/TRAINING PROGRAM

## 2025-03-05 PROCEDURE — 85027 COMPLETE CBC AUTOMATED: CPT

## 2025-03-05 PROCEDURE — 94660 CPAP INITIATION&MGMT: CPT

## 2025-03-05 PROCEDURE — 94761 N-INVAS EAR/PLS OXIMETRY MLT: CPT

## 2025-03-05 PROCEDURE — 94618 PULMONARY STRESS TESTING: CPT

## 2025-03-05 PROCEDURE — 94640 AIRWAY INHALATION TREATMENT: CPT

## 2025-03-05 PROCEDURE — 6370000000 HC RX 637 (ALT 250 FOR IP): Performed by: NURSE PRACTITIONER

## 2025-03-05 PROCEDURE — 2500000003 HC RX 250 WO HCPCS: Performed by: INTERNAL MEDICINE

## 2025-03-05 PROCEDURE — 6370000000 HC RX 637 (ALT 250 FOR IP): Performed by: STUDENT IN AN ORGANIZED HEALTH CARE EDUCATION/TRAINING PROGRAM

## 2025-03-05 PROCEDURE — 94669 MECHANICAL CHEST WALL OSCILL: CPT

## 2025-03-05 PROCEDURE — 82947 ASSAY GLUCOSE BLOOD QUANT: CPT

## 2025-03-05 PROCEDURE — 36415 COLL VENOUS BLD VENIPUNCTURE: CPT

## 2025-03-05 RX ORDER — FUROSEMIDE 40 MG/1
40 TABLET ORAL 2 TIMES DAILY
Qty: 60 TABLET | Refills: 3 | Status: SHIPPED | OUTPATIENT
Start: 2025-03-05

## 2025-03-05 RX ORDER — SPIRONOLACTONE 25 MG/1
25 TABLET ORAL DAILY
Qty: 30 TABLET | Refills: 3 | Status: SHIPPED | OUTPATIENT
Start: 2025-03-05

## 2025-03-05 RX ORDER — BLOOD-GLUCOSE METER
1 KIT MISCELLANEOUS 4 TIMES DAILY
Qty: 1 KIT | Refills: 0 | Status: SHIPPED | OUTPATIENT
Start: 2025-03-05

## 2025-03-05 RX ADMIN — AMLODIPINE BESYLATE 10 MG: 10 TABLET ORAL at 08:54

## 2025-03-05 RX ADMIN — BUDESONIDE AND FORMOTEROL FUMARATE DIHYDRATE 2 PUFF: 160; 4.5 AEROSOL RESPIRATORY (INHALATION) at 07:44

## 2025-03-05 RX ADMIN — ACETYLCYSTEINE 200 MG: 200 SOLUTION ORAL; RESPIRATORY (INHALATION) at 07:44

## 2025-03-05 RX ADMIN — ASPIRIN 81 MG: 81 TABLET, CHEWABLE ORAL at 08:54

## 2025-03-05 RX ADMIN — POLYETHYLENE GLYCOL 3350 17 G: 17 POWDER, FOR SOLUTION ORAL at 09:02

## 2025-03-05 RX ADMIN — EMPAGLIFLOZIN 10 MG: 10 TABLET, FILM COATED ORAL at 10:02

## 2025-03-05 RX ADMIN — GUAIFENESIN 1200 MG: 600 TABLET, EXTENDED RELEASE ORAL at 08:53

## 2025-03-05 RX ADMIN — ALBUTEROL SULFATE 2.5 MG: 2.5 SOLUTION RESPIRATORY (INHALATION) at 16:37

## 2025-03-05 RX ADMIN — ALBUTEROL SULFATE 2.5 MG: 2.5 SOLUTION RESPIRATORY (INHALATION) at 07:44

## 2025-03-05 RX ADMIN — APIXABAN 5 MG: 5 TABLET, FILM COATED ORAL at 08:53

## 2025-03-05 RX ADMIN — SODIUM CHLORIDE, PRESERVATIVE FREE 10 ML: 5 INJECTION INTRAVENOUS at 10:03

## 2025-03-05 RX ADMIN — FUROSEMIDE 40 MG: 20 TABLET ORAL at 08:54

## 2025-03-05 RX ADMIN — INSULIN LISPRO 4 UNITS: 100 INJECTION, SOLUTION INTRAVENOUS; SUBCUTANEOUS at 12:38

## 2025-03-05 RX ADMIN — ALBUTEROL SULFATE 2.5 MG: 2.5 SOLUTION RESPIRATORY (INHALATION) at 12:38

## 2025-03-05 RX ADMIN — SPIRONOLACTONE 25 MG: 25 TABLET, FILM COATED ORAL at 08:54

## 2025-03-05 RX ADMIN — SODIUM CHLORIDE, PRESERVATIVE FREE 10 ML: 5 INJECTION INTRAVENOUS at 10:05

## 2025-03-05 RX ADMIN — TRAMADOL HYDROCHLORIDE 50 MG: 50 TABLET, COATED ORAL at 10:02

## 2025-03-05 RX ADMIN — ATORVASTATIN CALCIUM 20 MG: 20 TABLET, FILM COATED ORAL at 08:54

## 2025-03-05 RX ADMIN — FUROSEMIDE 40 MG: 20 TABLET ORAL at 17:06

## 2025-03-05 RX ADMIN — CARVEDILOL 25 MG: 12.5 TABLET, FILM COATED ORAL at 17:06

## 2025-03-05 RX ADMIN — ACETAMINOPHEN 650 MG: 325 TABLET ORAL at 10:02

## 2025-03-05 RX ADMIN — CARVEDILOL 25 MG: 12.5 TABLET, FILM COATED ORAL at 08:54

## 2025-03-05 ASSESSMENT — PAIN DESCRIPTION - DESCRIPTORS
DESCRIPTORS: SHARP

## 2025-03-05 ASSESSMENT — PAIN DESCRIPTION - LOCATION
LOCATION: ABDOMEN

## 2025-03-05 ASSESSMENT — PAIN DESCRIPTION - ORIENTATION
ORIENTATION: RIGHT

## 2025-03-05 ASSESSMENT — PAIN SCALES - GENERAL
PAINLEVEL_OUTOF10: 2
PAINLEVEL_OUTOF10: 1
PAINLEVEL_OUTOF10: 2
PAINLEVEL_OUTOF10: 4
PAINLEVEL_OUTOF10: 7
PAINLEVEL_OUTOF10: 7

## 2025-03-05 ASSESSMENT — PAIN DESCRIPTION - FREQUENCY: FREQUENCY: OTHER (COMMENT)

## 2025-03-05 ASSESSMENT — PAIN DESCRIPTION - PAIN TYPE: TYPE: ACUTE PAIN

## 2025-03-05 NOTE — CARE COORDINATION
Updated oxygen orders faxed to [facility]. CM spoke with Cristina from [facility] and patient already has needed equipment. CM spoke with patient and he plans to return home independently. CM provided patient with Eliquis free trial and co-pay card. Patient has portable O2 concentrator in his room for transportation home. Patient states that family will be picking him up at 5pm today and he verbalizes having no additional transitional needs at this time.    Discharge Report    [facility]  Clinical Case Management Department  Written by: Bella Gonzales RN    Patient Name: Kiel ZAPATA Shay  Attending Provider: Anurag Cary MD  Admit Date: 2025  6:15 PM  MRN: [id number]  Account: [id number]                     : 1965  Discharge Date: 3/5/25      Disposition: home    Bella Gonzales RN

## 2025-03-05 NOTE — DISCHARGE SUMMARY
[facility]  Office: [phone]  Lemuel Miramontes DO, Galo Perez DO, Jensen Jacobo DO, Franky Snow DO, Caleb Rodriguez MD, Fatmata Purdy MD, Chyna Arauz MD, Sanam Gonzalez MD,  Speedy Matthew MD, Tl Santos MD, Bonnie Alexander MD,  Neil Trimble DO, Maryam English MD, Anurag Cary MD, Vipul Miramontes DO, Coby Rizzo MD,  Herman Junior DO, Yasmeen Paul MD, Verito Quezada MD, Ora Fairchild MD, Maria C Layton MD,  Jad Riley MD, Jessica Wheat MD, Paresh Stewart MD, Luis Weaver MD, Ramesh Bloom MD, Tommy Epps MD, Jose De Luna DO, Christopher Bocanegra MD, Neil Pedro MD, Mohsin Reza, MD, Shirley Waterhouse, CNP,  Lynette Evans CNP, Jose Berger, CNP,  Renetta Parisi, [facility], Talia Baer, CNP, Stephanie Briggs, CNP, Kelli Angel, CNP, Saundra Franklin, CNP, CINTHIA Bustillos-C, Betina Garcia, CNP, Cece Crawford, CNP,  Inessa Barr, CNP, Estrellita Murcia, CNP, Diane Hernandez, CNP,  Anastasiya Castillo, CNS, Yoli Ivy CNP, Ritu Sarabia CNP,   Bina Monge, CNP         [facility]   IN-PATIENT SERVICE   [facility]    Discharge Summary     Patient ID: Kiel Day  :  1965   MRN: [id number]     ACCOUNT:  [id number]   Patient's PCP: Angelita Walters MD  Admit Date: 2025   Discharge Date: 3/5/2025  Length of Stay: 11  Code Status:  Full Code  Admitting Physician: Vipul Miramontes DO  Discharge Physician: Anurag Cary MD     Active Discharge Diagnoses:     Hospital Problem Lists:  Principal Problem:    Acute hypoxic respiratory failure (HCC)  Active Problems:    NSTEMI (non-ST elevated myocardial infarction) (HCC)    Controlled type 2 diabetes mellitus without complication, without long-term current use of insulin (HCC)    Essential hypertension    Mixed hyperlipidemia    HCAP (healthcare-associated pneumonia)    Acute pulmonary embolism without acute cor pulmonale (HCC)    Acute on chronic heart failure with preserved ejection

## 2025-03-05 NOTE — PLAN OF CARE
Problem: ABCDS Injury Assessment  Goal: Absence of physical injury  2/25/2025 2251 by Brittany Rodgers, RN  Outcome: Progressing  Flowsheets (Taken 2/25/2025 2251)  Absence of Physical Injury: Implement safety measures based on patient assessment     Problem: Safety - Adult  Goal: Free from fall injury  2/25/2025 2251 by Brittany Rodgers, RN  Outcome: Progressing  Flowsheets (Taken 2/25/2025 2251)  Free From Fall Injury:   Instruct family/caregiver on patient safety   Based on caregiver fall risk screen, instruct family/caregiver to ask for assistance with transferring infant if caregiver noted to have fall risk factors     
  Problem: Chronic Conditions and Co-morbidities  Goal: Patient's chronic conditions and co-morbidity symptoms are monitored and maintained or improved  2/25/2025 0706 by Melissa Ellington RN  Outcome: Progressing  2/24/2025 1851 by Rowena Rubin RN  Outcome: Progressing  Flowsheets (Taken 2/24/2025 0748)  Care Plan - Patient's Chronic Conditions and Co-Morbidity Symptoms are Monitored and Maintained or Improved: Monitor and assess patient's chronic conditions and comorbid symptoms for stability, deterioration, or improvement     Problem: Discharge Planning  Goal: Discharge to home or other facility with appropriate resources  2/25/2025 0706 by Melissa Ellington RN  Outcome: Progressing  2/24/2025 1851 by Rowena Rubin RN  Outcome: Progressing  Flowsheets (Taken 2/24/2025 0748)  Discharge to home or other facility with appropriate resources: Identify barriers to discharge with patient and caregiver     Problem: Pain  Goal: Verbalizes/displays adequate comfort level or baseline comfort level  2/25/2025 0706 by Melissa Ellington RN  Outcome: Progressing  2/24/2025 1851 by Rowena Rubin RN  Outcome: Progressing  Flowsheets (Taken 2/24/2025 0747)  Verbalizes/displays adequate comfort level or baseline comfort level: Encourage patient to monitor pain and request assistance     Problem: ABCDS Injury Assessment  Goal: Absence of physical injury  2/25/2025 0706 by Melissa Ellington RN  Outcome: Progressing  2/24/2025 1851 by Rowena Rubin RN  Outcome: Progressing     Problem: Safety - Adult  Goal: Free from fall injury  2/25/2025 0706 by Melissa Ellington RN  Outcome: Progressing  2/24/2025 1851 by Rowena Rubin RN  Outcome: Progressing     Problem: Respiratory - Adult  Goal: Achieves optimal ventilation and oxygenation  2/25/2025 0706 by Melissa Ellington RN  Outcome: Progressing  2/25/2025 0643 by Kelli Mooney RCP  Outcome: Progressing  2/24/2025 2013 by Zandra Carr RCP  Outcome: Progressing  Flowsheets (Taken 2/24/2025 
  Problem: Chronic Conditions and Co-morbidities  Goal: Patient's chronic conditions and co-morbidity symptoms are monitored and maintained or improved  2/25/2025 1836 by Rowena Rubin RN  Outcome: Progressing  Flowsheets (Taken 2/25/2025 0730)  Care Plan - Patient's Chronic Conditions and Co-Morbidity Symptoms are Monitored and Maintained or Improved: Monitor and assess patient's chronic conditions and comorbid symptoms for stability, deterioration, or improvement  2/25/2025 0706 by Melissa Ellington RN  Outcome: Progressing     Problem: Discharge Planning  Goal: Discharge to home or other facility with appropriate resources  2/25/2025 1836 by Rowena Rubin RN  Outcome: Progressing  Flowsheets (Taken 2/25/2025 0730)  Discharge to home or other facility with appropriate resources: Identify barriers to discharge with patient and caregiver  2/25/2025 0706 by Melissa Ellington RN  Outcome: Progressing     Problem: Pain  Goal: Verbalizes/displays adequate comfort level or baseline comfort level  2/25/2025 1836 by Rowena Rubin RN  Outcome: Progressing  Flowsheets (Taken 2/25/2025 0725)  Verbalizes/displays adequate comfort level or baseline comfort level: Encourage patient to monitor pain and request assistance  2/25/2025 0706 by Melissa Ellington RN  Outcome: Progressing     Problem: ABCDS Injury Assessment  Goal: Absence of physical injury  2/25/2025 1836 by Rowena Rubin RN  Outcome: Progressing  2/25/2025 0706 by Melissa Ellington RN  Outcome: Progressing     Problem: Safety - Adult  Goal: Free from fall injury  2/25/2025 1836 by Rowena Rubin RN  Outcome: Progressing  2/25/2025 0706 by Melissa Ellington RN  Outcome: Progressing     Problem: Respiratory - Adult  Goal: Achieves optimal ventilation and oxygenation  2/25/2025 1836 by Rowean Rubin RN  Outcome: Progressing  Flowsheets  Taken 2/25/2025 0803 by Azalea Musa RCP  Achieves optimal ventilation and oxygenation:   Assess for changes in respiratory status   
  Problem: Chronic Conditions and Co-morbidities  Goal: Patient's chronic conditions and co-morbidity symptoms are monitored and maintained or improved  2/27/2025 0407 by Candice Okeefe RN  Outcome: Progressing     Problem: Discharge Planning  Goal: Discharge to home or other facility with appropriate resources  2/27/2025 0407 by Candice Okeefe RN  Outcome: Progressing     Problem: Pain  Goal: Verbalizes/displays adequate comfort level or baseline comfort level  2/27/2025 0407 by Candice Okeefe RN  Outcome: Progressing     Problem: ABCDS Injury Assessment  Goal: Absence of physical injury  2/27/2025 0407 by Candice Okeefe RN  Outcome: Progressing     Problem: Safety - Adult  Goal: Free from fall injury  2/27/2025 0407 by Candiec Okeefe RN  Outcome: Progressing     Problem: Respiratory - Adult  Goal: Achieves optimal ventilation and oxygenation  2/27/2025 0407 by Candice Okeefe RN  Outcome: Progressing     
  Problem: Chronic Conditions and Co-morbidities  Goal: Patient's chronic conditions and co-morbidity symptoms are monitored and maintained or improved  3/3/2025 2318 by Ro Clark RN  Outcome: Progressing  Flowsheets (Taken 3/3/2025 2318)  Care Plan - Patient's Chronic Conditions and Co-Morbidity Symptoms are Monitored and Maintained or Improved:   Monitor and assess patient's chronic conditions and comorbid symptoms for stability, deterioration, or improvement   Collaborate with multidisciplinary team to address chronic and comorbid conditions and prevent exacerbation or deterioration   Update acute care plan with appropriate goals if chronic or comorbid symptoms are exacerbated and prevent overall improvement and discharge     Problem: Discharge Planning  Goal: Discharge to home or other facility with appropriate resources  3/3/2025 2318 by Ro Clark RN  Outcome: Progressing  Flowsheets (Taken 3/3/2025 2318)  Discharge to home or other facility with appropriate resources: Identify barriers to discharge with patient and caregiver     Problem: Pain  Goal: Verbalizes/displays adequate comfort level or baseline comfort level  3/3/2025 2318 by Ro Clark RN  Outcome: Progressing  Flowsheets (Taken 3/3/2025 2318)  Verbalizes/displays adequate comfort level or baseline comfort level: Encourage patient to monitor pain and request assistance     Problem: ABCDS Injury Assessment  Goal: Absence of physical injury  3/3/2025 2318 by Ro Clark RN  Outcome: Progressing  Flowsheets (Taken 3/3/2025 2318)  Absence of Physical Injury: Implement safety measures based on patient assessment     Problem: Safety - Adult  Goal: Free from fall injury  3/3/2025 2318 by Ro Clark RN  Outcome: Progressing  Flowsheets (Taken 3/3/2025 2318)  Free From Fall Injury: Instruct family/caregiver on patient safety     
  Problem: Chronic Conditions and Co-morbidities  Goal: Patient's chronic conditions and co-morbidity symptoms are monitored and maintained or improved  Outcome: Progressing     Problem: Discharge Planning  Goal: Discharge to home or other facility with appropriate resources  Outcome: Progressing     Problem: Pain  Goal: Verbalizes/displays adequate comfort level or baseline comfort level  Outcome: Progressing     Problem: ABCDS Injury Assessment  Goal: Absence of physical injury  Outcome: Progressing     Problem: Safety - Adult  Goal: Free from fall injury  Outcome: Progressing     Problem: Respiratory - Adult  Goal: Achieves optimal ventilation and oxygenation  2/23/2025 1249 by Kelli Good RN  Outcome: Progressing  2/23/2025 0743 by Luh Barr RCP  Flowsheets (Taken 2/23/2025 0743)  Achieves optimal ventilation and oxygenation:   Assess for changes in respiratory status   Assess for changes in mentation and behavior   Oxygen supplementation based on oxygen saturation or arterial blood gases   Assess and instruct to report shortness of breath or any respiratory difficulty   Respiratory therapy support as indicated     
  Problem: Chronic Conditions and Co-morbidities  Goal: Patient's chronic conditions and co-morbidity symptoms are monitored and maintained or improved  Outcome: Progressing     Problem: Discharge Planning  Goal: Discharge to home or other facility with appropriate resources  Outcome: Progressing     Problem: Pain  Goal: Verbalizes/displays adequate comfort level or baseline comfort level  Outcome: Progressing     Problem: ABCDS Injury Assessment  Goal: Absence of physical injury  Outcome: Progressing     Problem: Safety - Adult  Goal: Free from fall injury  Outcome: Progressing     Problem: Respiratory - Adult  Goal: Achieves optimal ventilation and oxygenation  3/3/2025 1826 by Lilian Gan RN  Outcome: Progressing  3/3/2025 0801 by Luh Barr RCP  Outcome: Progressing     
  Problem: Chronic Conditions and Co-morbidities  Goal: Patient's chronic conditions and co-morbidity symptoms are monitored and maintained or improved  Outcome: Progressing     Problem: Discharge Planning  Goal: Discharge to home or other facility with appropriate resources  Outcome: Progressing     Problem: Pain  Goal: Verbalizes/displays adequate comfort level or baseline comfort level  Outcome: Progressing     Problem: ABCDS Injury Assessment  Goal: Absence of physical injury  Outcome: Progressing     Problem: Safety - Adult  Goal: Free from fall injury  Outcome: Progressing     Problem: Respiratory - Adult  Goal: Achieves optimal ventilation and oxygenation  Outcome: Progressing     
  Problem: Chronic Conditions and Co-morbidities  Goal: Patient's chronic conditions and co-morbidity symptoms are monitored and maintained or improved  Outcome: Progressing     Problem: Discharge Planning  Goal: Discharge to home or other facility with appropriate resources  Outcome: Progressing     Problem: Pain  Goal: Verbalizes/displays adequate comfort level or baseline comfort level  Outcome: Progressing     Problem: ABCDS Injury Assessment  Goal: Absence of physical injury  Outcome: Progressing     Problem: Safety - Adult  Goal: Free from fall injury  Outcome: Progressing     Problem: Respiratory - Adult  Goal: Achieves optimal ventilation and oxygenation  Outcome: Progressing     
  Problem: Chronic Conditions and Co-morbidities  Goal: Patient's chronic conditions and co-morbidity symptoms are monitored and maintained or improved  Outcome: Progressing     Problem: Discharge Planning  Goal: Discharge to home or other facility with appropriate resources  Outcome: Progressing     Problem: Pain  Goal: Verbalizes/displays adequate comfort level or baseline comfort level  Outcome: Progressing     Problem: ABCDS Injury Assessment  Goal: Absence of physical injury  Outcome: Progressing     Problem: Safety - Adult  Goal: Free from fall injury  Outcome: Progressing     Problem: Respiratory - Adult  Goal: Achieves optimal ventilation and oxygenation  Outcome: Progressing  Flowsheets (Taken 2/26/2025 0802 by Azalea Musa, RCP)  Achieves optimal ventilation and oxygenation:   Assess for changes in respiratory status   Oxygen supplementation based on oxygen saturation or arterial blood gases   Assess and instruct to report shortness of breath or any respiratory difficulty   Respiratory therapy support as indicated   Assess the need for suctioning and aspirate as needed     
  Problem: Chronic Conditions and Co-morbidities  Goal: Patient's chronic conditions and co-morbidity symptoms are monitored and maintained or improved  Outcome: Progressing  Flowsheets (Taken 2/24/2025 0748)  Care Plan - Patient's Chronic Conditions and Co-Morbidity Symptoms are Monitored and Maintained or Improved: Monitor and assess patient's chronic conditions and comorbid symptoms for stability, deterioration, or improvement     Problem: Discharge Planning  Goal: Discharge to home or other facility with appropriate resources  Outcome: Progressing  Flowsheets (Taken 2/24/2025 0748)  Discharge to home or other facility with appropriate resources: Identify barriers to discharge with patient and caregiver     Problem: Pain  Goal: Verbalizes/displays adequate comfort level or baseline comfort level  Outcome: Progressing  Flowsheets (Taken 2/24/2025 0747)  Verbalizes/displays adequate comfort level or baseline comfort level: Encourage patient to monitor pain and request assistance     Problem: ABCDS Injury Assessment  Goal: Absence of physical injury  Outcome: Progressing     Problem: Safety - Adult  Goal: Free from fall injury  Outcome: Progressing     Problem: Respiratory - Adult  Goal: Achieves optimal ventilation and oxygenation  Outcome: Progressing  Flowsheets (Taken 2/24/2025 0748)  Achieves optimal ventilation and oxygenation: Assess for changes in respiratory status     
  Problem: Pain  Goal: Verbalizes/displays adequate comfort level or baseline comfort level  3/5/2025 1430 by Ximena Rehman RN  Outcome: Completed     Problem: ABCDS Injury Assessment  Goal: Absence of physical injury  3/5/2025 1430 by Ximena Rehman RN  Outcome: Completed  3/5/2025 0228 by Salma Moreno RN  Outcome: Progressing     Problem: Chronic Conditions and Co-morbidities  Goal: Patient's chronic conditions and co-morbidity symptoms are monitored and maintained or improved  3/5/2025 1430 by Ximena Rehman RN  Outcome: Completed  Flowsheets (Taken 3/5/2025 0700)  Care Plan - Patient's Chronic Conditions and Co-Morbidity Symptoms are Monitored and Maintained or Improved:   Monitor and assess patient's chronic conditions and comorbid symptoms for stability, deterioration, or improvement   Collaborate with multidisciplinary team to address chronic and comorbid conditions and prevent exacerbation or deterioration   Update acute care plan with appropriate goals if chronic or comorbid symptoms are exacerbated and prevent overall improvement and discharge     Problem: Discharge Planning  Goal: Discharge to home or other facility with appropriate resources  3/5/2025 1430 by Ximena Rehman RN  Outcome: Completed  Flowsheets (Taken 3/5/2025 0700)  Discharge to home or other facility with appropriate resources:   Identify discharge learning needs (meds, wound care, etc)   Identify barriers to discharge with patient and caregiver   Arrange for needed discharge resources and transportation as appropriate     
  Problem: Respiratory - Adult  Goal: Achieves optimal ventilation and oxygenation  2/24/2025 2013 by Zandra Carr RCP  Outcome: Progressing  Flowsheets (Taken 2/24/2025 2013)  Achieves optimal ventilation and oxygenation:   Assess for changes in respiratory status   Assess for changes in mentation and behavior   Assess and instruct to report shortness of breath or any respiratory difficulty   Position to facilitate oxygenation and minimize respiratory effort     
  Problem: Respiratory - Adult  Goal: Achieves optimal ventilation and oxygenation  2/25/2025 0643 by Kelli Mooney, JOEL  Flowsheets (Taken 2/24/2025 2013)  Achieves optimal ventilation and oxygenation:   Assess for changes in respiratory status   Assess for changes in mentation and behavior   Assess and instruct to report shortness of breath or any respiratory difficulty   Position to facilitate oxygenation and minimize respiratory effort       
  Problem: Respiratory - Adult  Goal: Achieves optimal ventilation and oxygenation  3/1/2025 0816 by Elodia Louie RCP  Outcome: Progressing  Flowsheets (Taken 3/1/2025 0816)  Achieves optimal ventilation and oxygenation:   Assess for changes in respiratory status   Position to facilitate oxygenation and minimize respiratory effort   Assess the need for suctioning and aspirate as needed   Respiratory therapy support as indicated   Assess for changes in mentation and behavior   Oxygen supplementation based on oxygen saturation or arterial blood gases   Encourage broncho-pulmonary hygiene including cough, deep breathe, incentive spirometry   Assess and instruct to report shortness of breath or any respiratory difficulty     
  Problem: Respiratory - Adult  Goal: Achieves optimal ventilation and oxygenation  3/4/2025 1629 by Kenna Mariano RCP  Outcome: Progressing  Flowsheets  Taken 3/4/2025 1629  Achieves optimal ventilation and oxygenation: Assess for changes in respiratory status  Taken 3/4/2025 1627  Achieves optimal ventilation and oxygenation: Assess for changes in respiratory status  3/4/2025 1548 by Ani Macedo, RN  Outcome: Progressing     
Aerosol and mdi given, pt on 3lpm cannula 92%, diminished breath sounds  
Aerosol and mdi given, pt on heated high flow 60l 90% sat 97, will wean as hiwot  
Aerosol and mdi given. Diminished breath sounds. Oxygen at 4lpm 92%  
Aerosol given, coughing up pale sputum wean oxygen as tolerated diminished breath sounds  
BRONCHOSPASM/BRONCHOCONSTRICTION    IMPROVE  AERATION/BREATHSOUNDS  ADMINISTER BRONCHODILATOR THERAPY AS APPROPRIATE  ASSESS BREATH SOUNDS  PATIENT EDUCATION AS NEEDEDInhaler / Aerosol Education        [x] Served spacer    [] Provided and reviewed booklet   [x] Good return demonstration per patient   [x] Aerosolized Medications:     Verbal education has been provided in the use, benefits and possible adverse reactions of aerosolized medications used in the treatment of this patient.    [x] Other:PROVIDE ADEQUATE OXYGENATION WITH ACCEPTABLE SP02/ABG'S    [x]  IDENTIFY APPROPRIATE OXYGEN THERAPY  [x]   MONITOR SP02/ABG'S AS NEEDED   []   PATIENT EDUCATION AS NEEDED      Problem: Respiratory - Adult  Goal: Achieves optimal ventilation and oxygenation  3/3/2025 2022 by Jackeline Macedo, JOEL  Outcome: Progressing       
BRONCHOSPASM/BRONCHOCONSTRICTION    IMPROVE  AERATION/BREATHSOUNDS  ADMINISTER BRONCHODILATOR THERAPY AS APPROPRIATE  ASSESS BREATH SOUNDS  PATIENT EDUCATION AS NEEDEDInhaler / Aerosol Education        [x] Served spacer    [x] Provided and reviewed booklet   [x] Good return demonstration per patient   [x] Aerosolized Medications:     Verbal education has been provided in the use, benefits and possible adverse reactions of aerosolized medications used in the treatment of this patient.    [x] Other:NON INVASIVE VENTILATION  PROVIDE OPTIMAL VENTILATION/ACCEPTABLE SP02  IMPLEMENT NON INVASIVE VENTILATION PROTOCOL  ASSESSMENT SKIN INTEGRITY  PATIENT EDUCATION AS NEEDED  BIPAP AS NEEDED      NIV 20/10, rate 18, 50%        Problem: Respiratory - Adult  Goal: Achieves optimal ventilation and oxygenation  2/25/2025 2213 by Jackeline Macedo, JOEL  Outcome: Progressing     
BRONCHOSPASM/BRONCHOCONSTRICTION    IMPROVE  AERATION/BREATHSOUNDS  ADMINISTER BRONCHODILATOR THERAPY AS APPROPRIATE  ASSESS BREATH SOUNDS  PATIENT EDUCATION AS NEEDEDNON INVASIVE VENTILATION  PROVIDE OPTIMAL VENTILATION/ACCEPTABLE SP02  IMPLEMENT NON INVASIVE VENTILATION PROTOCOL  ASSESSMENT SKIN INTEGRITY  PATIENT EDUCATION AS NEEDED  BIPAP AS NEEDED  NIV at HS         Inhaler / Aerosol Education        [x] Served spacer    [] Provided and reviewed booklet   [x] Good return demonstration per patient   [x] Aerosolized Medications:     Verbal education has been provided in the use, benefits and possible adverse reactions of aerosolized medications used in the treatment of this patient.    [x] Other: Keerthi NC 7 LPM       Problem: Respiratory - Adult  Goal: Achieves optimal ventilation and oxygenation  2/27/2025 2021 by Jackeline Macedo RCP  Outcome: Progressing         
BRONCHOSPASM/BRONCHOCONSTRICTION    IMPROVE  AERATION/BREATHSOUNDS  ADMINISTER BRONCHODILATOR THERAPY AS APPROPRIATE  ASSESS BREATH SOUNDS  PATIENT EDUCATION AS NEEDEDPROVIDE ADEQUATE OXYGENATION WITH ACCEPTABLE SP02/ABG'S    [x]  IDENTIFY APPROPRIATE OXYGEN THERAPY  [x]   MONITOR SP02/ABG'S AS NEEDED   [x]   PATIENT EDUCATION AS NEEDED      NON INVASIVE VENTILATION  PROVIDE OPTIMAL VENTILATION/ACCEPTABLE SP02  IMPLEMENT NON INVASIVE VENTILATION PROTOCOL  ASSESSMENT SKIN INTEGRITY  PATIENT EDUCATION AS NEEDED  BIPAP AS NEEDED      
I have discussed in detail the risks, benefits, and alternatives to the procedure including but not limited to risk of bleeding/hematoma requiring surgical intervention, contrast induced allergy and/or nephropathy, arrythmia, CVA, MI or death. The patient verbalized understanding and wishes to proceed.   
Problem: Pain  Goal: Verbalizes/displays adequate comfort level or baseline comfort level  3/5/2025 0228 by Salma Moreno, RN  Outcome: Progressing   No new signs/symptoms of pain noted, pain rating < 3 on scale 0-10, pain controlled with medication/repositioning  Problem: Safety - Adult  Goal: Free from fall injury  3/5/2025 0228 by Salma Moreno, RN  Outcome: Progressing   No falls/injuries this shift, bed in lowest position, brakes on, bed alarm on, call light in reach, side rails up x2  Problem: Respiratory - Adult  Goal: Achieves optimal ventilation and oxygenation  3/5/2025 0228 by Salma Moreno, RN  Outcome: Progressing  Flowsheets (Taken 3/4/2025 2011 by Kenna Mariano, JOEL)  Achieves optimal ventilation and oxygenation: Assess for changes in respiratory status   O2 sats>92% this shift, pt on 4.5L/NC  
2157)  Achieves optimal ventilation and oxygenation:   Assess for changes in respiratory status   Assess for changes in mentation and behavior     
2344 by Ro Clark, RN  Outcome: Progressing  Flowsheets  Taken 2/28/2025 2344 by Ro Clark, RN  Achieves optimal ventilation and oxygenation:   Assess for changes in respiratory status   Assess for changes in mentation and behavior  Taken 2/28/2025 2031 by Maribel Elizondo RCP  Achieves optimal ventilation and oxygenation:   Assess for changes in respiratory status   Assess for changes in mentation and behavior   Position to facilitate oxygenation and minimize respiratory effort   Oxygen supplementation based on oxygen saturation or arterial blood gases     
status   Assess for changes in mentation and behavior  Taken 3/2/2025 2051 by Maribel Elizondo RCP  Achieves optimal ventilation and oxygenation:   Assess for changes in respiratory status   Assess for changes in mentation and behavior   Position to facilitate oxygenation and minimize respiratory effort   Oxygen supplementation based on oxygen saturation or arterial blood gases  Taken 3/2/2025 1134 by Azalea Musa RCP  Achieves optimal ventilation and oxygenation:   Assess for changes in respiratory status   Oxygen supplementation based on oxygen saturation or arterial blood gases   Encourage broncho-pulmonary hygiene including cough, deep breathe, incentive spirometry   Assess and instruct to report shortness of breath or any respiratory difficulty   Respiratory therapy support as indicated

## 2025-03-05 NOTE — CARE COORDINATION
Cricket spoke with Maria Del Rosario Pharmacy Tech @ [facility]. Cost of Medications is as follows:    Elquis 40.00  (Provided 30day coupon)  Lasix 8.00  Spironolactone 5.56   Freestyle [place] Kit  16.01     Maria Del Rosario used [facility] for comparison and cost is not any different.   Sheron Gross updated.

## 2025-03-05 NOTE — PROGRESS NOTES
Pulmonary Progress Note  [facility]      Patient - Kiel Day,  Age - 59 y.o.    - 1965      Room Number - 340/340-01   N -  [id number]   [facility] # - [id number]  Date of Admission -  2025  6:15 PM    Consulting Service/Physician   Consulting - Vipul Miramontes DO  Primary Care Physician - Angelita Walters MD     SUBJECTIVE   Patient seen sitting up in his bed this morning.  He is saturating well at 8 L/min oxygen.  He states that he is feeling well, and that his shortness of breath is much better.  He is still coughing some mucus and he feels that it is improving overall.  He did note that his legs seem more swollen today than they were yesterday.  He denied chest pain, abdominal pain, weakness, pain in his calves.    OBJECTIVE   VITALS    height is 1.8 m (5' 10.87\") and weight is 95 kg (209 lb 7 oz). His oral temperature is 98.2 °F (36.8 °C). His blood pressure is 163/61 (abnormal) and his pulse is 69. His respiration is 20 and oxygen saturation is 89% (abnormal).     Body mass index is 29.32 kg/m².  Temperature Range: Temp: 98.2 °F (36.8 °C) Temp  Av °F (36.7 °C)  Min: 97.3 °F (36.3 °C)  Max: 98.4 °F (36.9 °C)  BP Range:  Systolic (24hrs), Av , Min:129 , Max:163     Diastolic (24hrs), Av, Min:56, Max:77    Pulse Range: Pulse  Av.3  Min: 59  Max: 80  Respiration Range: Resp  Av.5  Min: 12  Max: 26  Current Pulse Ox::  SpO2: (!) 89 %  24HR Pulse Ox Range:  SpO2  Av.1 %  Min: 89 %  Max: 97 %  Oxygen Amount and Delivery: O2 Flow Rate (L/min): 8 L/min    Wt Readings from Last 3 Encounters:   25 95 kg (209 lb 7 oz)   25 97.9 kg (215 lb 13.3 oz)   24 94.3 kg (208 lb)       I/O (24 Hours)    Intake/Output Summary (Last 24 hours) at 2025 1159  Last data filed at 2025 1119  Gross per 24 hour   Intake --   Output 3750 ml   Net -3750 ml       EXAM     General Appearance  Awake, alert, 
                               Pulmonary Progress Note  [facility]      Patient - Kiel Day,  Age - 59 y.o.    - 1965      Room Number - 340/340-01   N -  [id number]   [facility] # - [id number]  Date of Admission -  2025  6:15 PM    Consulting Service/Physician   Consulting - Vipul Miramontes DO  Primary Care Physician - Angelita Walters MD     SUBJECTIVE   Patient was seen this afternoon appears to be doing well.  He is currently saturating well on high flow oxygen 25 L.  He states that he is coughed up mucus that is significantly helped his breathing overnight.  He denies any chest pain, worsening shortness of breath, weakness.     OBJECTIVE   VITALS    height is 1.8 m (5' 10.87\") and weight is 98.1 kg (216 lb 4.8 oz). His oral temperature is 98.1 °F (36.7 °C). His blood pressure is 150/66 (abnormal) and his pulse is 60. His respiration is 18 and oxygen saturation is 90%.     Body mass index is 30.28 kg/m².  Temperature Range: Temp: 98.1 °F (36.7 °C) Temp  Av.1 °F (36.7 °C)  Min: 97.5 °F (36.4 °C)  Max: 98.3 °F (36.8 °C)  BP Range:  Systolic (24hrs), Av , Min:148 , Max:184     Diastolic (24hrs), Av, Min:64, Max:75    Pulse Range: Pulse  Av.9  Min: 54  Max: 94  Respiration Range: Resp  Av.9  Min: 13  Max: 34  Current Pulse Ox::  SpO2: 90 %  24HR Pulse Ox Range:  SpO2  Av.1 %  Min: 82 %  Max: 97 %  Oxygen Amount and Delivery: O2 Flow Rate (L/min): 45 L/min    Wt Readings from Last 3 Encounters:   25 98.1 kg (216 lb 4.8 oz)   25 97.9 kg (215 lb 13.3 oz)   24 94.3 kg (208 lb)       I/O (24 Hours)    Intake/Output Summary (Last 24 hours) at 2025 1312  Last data filed at 2025 0755  Gross per 24 hour   Intake 700 ml   Output 2500 ml   Net -1800 ml       EXAM     General Appearance  Awake, alert, oriented x3, in no acute distress  HEENT - normocephalic, atraumatic.   Neck - Supple,  trachea midline   Lungs - Rhonchi 
                               Pulmonary Progress Note  [facility]      Patient - Kiel Day,  Age - 59 y.o.    - 1965      Room Number - 340/340-01   N -  [id number]   [facility] # - [id number]  Date of Admission -  2025  6:15 PM    Consulting Service/Physician   Consulting - Anurag Cary MD  Primary Care Physician - Angelita Walters MD     SUBJECTIVE   Patient endorsed a productive cough and says that he is getting stuff up all the time. He denies having shortness of breath, pain with inspiration, or fever.    OBJECTIVE   VITALS    height is 1.8 m (5' 10.87\") and weight is 95 kg (209 lb 7 oz). His oral temperature is 97.9 °F (36.6 °C). His blood pressure is 155/64 (abnormal) and his pulse is 62. His respiration is 16 and oxygen saturation is 88% (abnormal).     Body mass index is 29.32 kg/m².  Temperature Range: Temp: 97.9 °F (36.6 °C) Temp  Av °F (36.7 °C)  Min: 97.5 °F (36.4 °C)  Max: 98.4 °F (36.9 °C)  BP Range:  Systolic (24hrs), Av , Min:155 , Max:166     Diastolic (24hrs), Av, Min:58, Max:65    Pulse Range: Pulse  Av.3  Min: 60  Max: 89  Respiration Range: Resp  Av.7  Min: 13  Max: 26  Current Pulse Ox::  SpO2: (!) 88 %  24HR Pulse Ox Range:  SpO2  Av.5 %  Min: 88 %  Max: 95 %  Oxygen Amount and Delivery: O2 Flow Rate (L/min): 4 L/min    Wt Readings from Last 3 Encounters:   25 95 kg (209 lb 7 oz)   25 97.9 kg (215 lb 13.3 oz)   24 94.3 kg (208 lb)       I/O (24 Hours)    Intake/Output Summary (Last 24 hours) at 3/5/2025 0831  Last data filed at 3/4/2025 2300  Gross per 24 hour   Intake 970 ml   Output 1650 ml   Net -680 ml       EXAM     General Appearance  Awake, alert, oriented, in no acute distress  HEENT - normocephalic, atraumatic. [x]  Mallampati 3  [] Crowded airway   [] Macroglossia  []  Retrognathia  [] Micrognathia  []  Normal tongue size []  Normal Bite  [] Wes sign positive    Neck - Supple,  
               Cardiology Progress Note                     Date:   2/28/2025  Patient name: Kiel Day  Date of admission:  2/22/2025  6:15 PM  MRN:   [id number]  YOB: 1965  PCP: Angelita Walters MD    Reason for Admission:  acute hypoxic resp failure     Subjective:       Patient's respiratory status continues to improve, he reports improvement in SOB, denies any chest pain, used BiPAP overnight, currently on 8L O2 hiflow. Overall -13.3 L since admission.  BUN/creatinine is stable.      Scheduled Meds:   insulin lispro  0-16 Units SubCUTAneous 4x Daily AC & HS    cefepime  2,000 mg IntraVENous Q12H    albuterol  2.5 mg Nebulization Q4H While awake    acetylcysteine  200 mg Inhalation Q4H WA RT    atorvastatin  20 mg Oral Daily    carvedilol  25 mg Oral BID WC    guaiFENesin  1,200 mg Oral BID    furosemide  40 mg IntraVENous BID    empagliflozin  10 mg Oral Daily    spironolactone  25 mg Oral Daily    sodium chloride flush  5-40 mL IntraVENous 2 times per day    aspirin  81 mg Oral Daily    budesonide-formoterol  2 puff Inhalation BID RT       Continuous Infusions:   heparin (PORCINE) Infusion 7 Units/kg/hr (02/28/25 0054)    dextrose      sodium chloride         Labs:     CBC:   Recent Labs     02/25/25  1303 02/26/25  0602 02/27/25  0534   WBC 19.5* 19.9* 19.1*   HGB 12.0* 11.7* 11.7*    256 281     BMP:    Recent Labs     02/26/25  0602 02/27/25  0534 02/28/25  0533   * 133* 131*   K 4.6 4.2 4.4   CL 94* 93* 92*   CO2 28 30 31   BUN 39* 42* 41*   CREATININE 1.4* 1.4* 1.3*   GLUCOSE 190* 195* 141*     Hepatic:   Recent Labs     02/26/25  0602   AST 17   ALT 32   BILITOT 0.3   ALKPHOS 99     Troponin: No results for input(s): \"TROPONINI\" in the last 72 hours.  BNP: No results for input(s): \"BNP\" in the last 72 hours.  Lipids: No results for input(s): \"CHOL\", \"HDL\" in the last 72 hours.    Invalid input(s): \"LDLCALCU\"  INR:   Recent Labs     02/25/25  1303   INR 1.7* 
               Cardiology Progress Note                     Date:   3/1/2025  Patient name: Kiel Day  Date of admission:  2/22/2025  6:15 PM  MRN:   [id number]  YOB: 1965  PCP: Angelita Walters MD    Reason for Admission:  acute hypoxic resp failure     Subjective:     No acute events overnight, continues to report improvement in dyspnea, was able to lay down flat with NIPPV the other night, overall -17 L since admission, BUN/creatinine has been stable, denies any chest pain, leukocytosis has improved.        Scheduled Meds:   insulin lispro  0-16 Units SubCUTAneous 4x Daily AC & HS    albuterol  2.5 mg Nebulization Q4H While awake    acetylcysteine  200 mg Inhalation Q4H WA RT    atorvastatin  20 mg Oral Daily    carvedilol  25 mg Oral BID WC    guaiFENesin  1,200 mg Oral BID    furosemide  40 mg IntraVENous BID    empagliflozin  10 mg Oral Daily    spironolactone  25 mg Oral Daily    sodium chloride flush  5-40 mL IntraVENous 2 times per day    aspirin  81 mg Oral Daily    budesonide-formoterol  2 puff Inhalation BID RT       Continuous Infusions:   heparin (PORCINE) Infusion 9 Units/kg/hr (03/01/25 0644)    dextrose      sodium chloride         Labs:     CBC:   Recent Labs     02/27/25  0534 03/01/25  0517   WBC 19.1* 14.3*   HGB 11.7* 10.9*    280     BMP:    Recent Labs     02/27/25  0534 02/28/25  0533 03/01/25  0517   * 131* 130*   K 4.2 4.4 4.3   CL 93* 92* 92*   CO2 30 31 29   BUN 42* 41* 41*   CREATININE 1.4* 1.3* 1.3*   GLUCOSE 195* 141* 306*     Hepatic:   No results for input(s): \"AST\", \"ALT\", \"BILITOT\", \"ALKPHOS\" in the last 72 hours.    Invalid input(s): \"ALB\"    Troponin: No results for input(s): \"TROPONINI\" in the last 72 hours.  BNP: No results for input(s): \"BNP\" in the last 72 hours.  Lipids: No results for input(s): \"CHOL\", \"HDL\" in the last 72 hours.    Invalid input(s): \"LDLCALCU\"  INR:   No results for input(s): \"INR\" in the last 72 hours.        Objective: 
               Cardiology Progress Note                     Date:   3/4/2025  Patient name: Kiel Day  Date of admission:  2/22/2025  6:15 PM  MRN:   [id number]  YOB: 1965  PCP: Angelita Walters MD    Reason for Admission:  acute hypoxic resp failure     Subjective:     Still requiring 4 L O2, discharge was held today, otherwise feeling better, right radial artery access site soft with no hematoma. Cr improved to 1.2.     Scheduled Meds:   furosemide  40 mg Oral BID    amLODIPine  10 mg Oral Daily    sodium chloride flush  5-40 mL IntraVENous 2 times per day    apixaban  5 mg Oral BID    acetylcysteine  200 mg Inhalation BID    albuterol  2.5 mg Nebulization 4x Daily RT    insulin lispro  0-16 Units SubCUTAneous 4x Daily AC & HS    atorvastatin  20 mg Oral Daily    carvedilol  25 mg Oral BID WC    guaiFENesin  1,200 mg Oral BID    empagliflozin  10 mg Oral Daily    spironolactone  25 mg Oral Daily    sodium chloride flush  5-40 mL IntraVENous 2 times per day    aspirin  81 mg Oral Daily    budesonide-formoterol  2 puff Inhalation BID RT       Continuous Infusions:   sodium chloride      dextrose      sodium chloride         Labs:     CBC:   Recent Labs     03/03/25  0602   WBC 11.5*   HGB 10.3*        BMP:    Recent Labs     03/02/25  0256 03/03/25  0602 03/04/25  0557   * 137 138   K 4.2 4.7 4.9   CL 95* 97* 99   CO2 31 30 31   BUN 44* 47* 40*   CREATININE 1.4* 1.5* 1.2   GLUCOSE 203* 186* 201*     Hepatic:   No results for input(s): \"AST\", \"ALT\", \"BILITOT\", \"ALKPHOS\" in the last 72 hours.    Invalid input(s): \"ALB\"    Troponin: No results for input(s): \"TROPONINI\" in the last 72 hours.  BNP: No results for input(s): \"BNP\" in the last 72 hours.  Lipids: No results for input(s): \"CHOL\", \"HDL\" in the last 72 hours.    Invalid input(s): \"LDLCALCU\"  INR:   No results for input(s): \"INR\" in the last 72 hours.        Objective:     Vitals: BP (!) 158/64   Pulse 70   Temp 98.1 °F (36.7 
              Cardiology progress note        Date:  3/2/2025  Patient: Kiel Day  Admission:  2/22/2025  6:15 PM  Admit DX: Hypoxemia [R09.02]  Influenza A [J10.1]  Acute hypoxic respiratory failure (HCC) [J96.01]  Age:  59 y.o., 1965     LOS: 8 days     Reason for evaluation:   Acute hypoxic respiratory failure      SUBJECTIVE:     The patient was seen and examined. Notes and labs reviewed.    Patient states that shortness of breath is improving however still has pleuritic chest pain on the right side    OBJECTIVE:      EXAM:   Vitals:    VITALS:  /64   Pulse 69   Temp 98.4 °F (36.9 °C) (Oral)   Resp 20   Ht 1.8 m (5' 10.87\")   Wt 95 kg (209 lb 7 oz)   SpO2 90%   BMI 29.32 kg/m²    24HR INTAKE/OUTPUT:    Intake/Output Summary (Last 24 hours) at 3/2/2025 1259  Last data filed at 3/2/2025 1020  Gross per 24 hour   Intake --   Output 4085 ml   Net -4085 ml       General appearance: awake, alert, on 5 L nasal cannula oxygen  HEENT: No JVD, normocephalic, no lesions, without obvious abnormality  Lungs: coarse breathing bilaterally without any active wheezing  Heart: regular rate and rhythm, S1, S2, 2/6 systolic murmur  Abdomen: soft, non-tender; bowel sounds normal  Extremities: 2+ bilateral pitting LE edema  Neurologic: alert, oriented. Motor and sensory not done    Current Inpatient Medications:   insulin lispro  0-16 Units SubCUTAneous 4x Daily AC & HS    albuterol  2.5 mg Nebulization Q4H While awake    acetylcysteine  200 mg Inhalation Q4H WA RT    atorvastatin  20 mg Oral Daily    carvedilol  25 mg Oral BID WC    guaiFENesin  1,200 mg Oral BID    furosemide  40 mg IntraVENous BID    empagliflozin  10 mg Oral Daily    spironolactone  25 mg Oral Daily    sodium chloride flush  5-40 mL IntraVENous 2 times per day    aspirin  81 mg Oral Daily    budesonide-formoterol  2 puff Inhalation BID RT       IV Infusions (if any):   heparin (PORCINE) Infusion 15 Units/kg/hr (03/02/25 3575)    dextrose 
   02/25/25 0803   Care Plan - Respiratory Goals   Achieves optimal ventilation and oxygenation Assess for changes in respiratory status;Oxygen supplementation based on oxygen saturation or arterial blood gases;Assess and instruct to report shortness of breath or any respiratory difficulty;Respiratory therapy support as indicated       
   02/26/25 0802   Care Plan - Respiratory Goals   Achieves optimal ventilation and oxygenation Assess for changes in respiratory status;Oxygen supplementation based on oxygen saturation or arterial blood gases;Assess and instruct to report shortness of breath or any respiratory difficulty;Respiratory therapy support as indicated;Assess the need for suctioning and aspirate as needed       
   03/02/25 1134   Care Plan - Respiratory Goals   Achieves optimal ventilation and oxygenation Assess for changes in respiratory status;Oxygen supplementation based on oxygen saturation or arterial blood gases;Encourage broncho-pulmonary hygiene including cough, deep breathe, incentive spirometry;Assess and instruct to report shortness of breath or any respiratory difficulty;Respiratory therapy support as indicated       
  Physician Progress Note      PATIENT:               MAISHA RAYGOZA  CSN #:                  [id number]  :                       1965  ADMIT DATE:       2025 6:15 PM  DISCH DATE:  RESPONDING  PROVIDER #:        Anurag Cary MD          QUERY TEXT:    Pt admitted with SOB and chest pain   Pt noted to have documentation of HCAP.   with recent admission for influenza.CXR shows lower lobe airspace opacities   and is being treated with iv Cefepime and Vanco. If possible, please document   in the progress notes and discharge summary if you are evaluating and/or   treating any of the following:    Note: CAP and HCAP indicate where the pneumonia was acquired, not a specific   type.    The medical record reflects the following:  Risk Factors: age 59, recent hospital stay, acute respiratory failure, acute   pulmonary embolism  Clinical Indicators:admitted with SOB and chest pain   Pt noted to have   documentation of HCAP. with recent admission for influenza.CXR shows lower   lobe airspace opacities and is being treated with iv Cefepime and Vanco  Treatment: iv vanco, iv cefepime, high flow oxygen, ICU monitoring    Thank You Mirza WOLF BSN CCDS  Options provided:  -- Treating a Gram negative pneumonia  -- treating a MSSA pneumonia  -- Treating a MRSA pneumonia  -- Other - I will add my own diagnosis  -- Disagree - Not applicable / Not valid  -- Disagree - Clinically unable to determine / Unknown  -- Refer to Clinical Documentation Reviewer    PROVIDER RESPONSE TEXT:    This patient is being treated for a gram negative pneumonia.    Query created by: Rhonda Garg on 2025 10:23 AM      Electronically signed by:  Anurag Cary MD 2025 10:29 AM          
[facility]  Office: [phone]  Lemuel Miramontes DO, Galo Perez DO, Jensen Jacobo DO, Franky Snow DO, Caleb Rodriguez MD, Fatmata Purdy MD, Chyna Arauz MD, Sanam Gonzalez MD,  Speedy Matthew MD, Tl Santos MD, Bonnie Alexander MD,  Neil Trimble DO, Maryam English MD, Anurag Cary MD, Vipul Miramontes DO, Coby Rizzo MD,  Herman Junior DO, Yasmeen Paul MD, Verito Quezada MD, Ora Fairchild MD, Maria C Layton MD,  Jad Riley MD, Jessica Wheat MD, Paresh Stewart MD, Luis Weaver MD, Ramesh Bloom MD, Tommy Epps MD, Jose De Luna DO, Christopher Bocanegra MD, Neli Pedro MD, Mohsin Reza, MD, Shirley Waterhouse, CNP,  Lynette Evans CNP, Jose Berger, CNP,  Renetta Parisi, LEONARDO, Talia Baer, CNP, Stephanie Briggs, CNP, Kelli Angel, CNP, Saundra Franklin CNP, CINTHIA Bustillos-DIANNA, Betina Garcia, CNP, Cece Crawford, CNP,  Inessa Barr, CNP, Estrellita Murcia, CNP, Diane Hernandez, CNP,  Anastasiya Castillo, CNS, Yoli Ivy CNP, Ritu Sarabia CNP,   Bina Monge, CNP         [facility]   IN-PATIENT SERVICE   [facility]    Progress Note    3/2/2025    12:16 PM    Name:   Kiel Day  MRN:     [id number]     Acct:      [id number]   Room:   [address] [place] [address] Day:  8  Admit Date:  2/22/2025  6:15 PM    PCP:   Angelita Walters MD  Code Status:  Full Code    Subjective:     Patient seen in follow-up for acute hypoxic respiratory failure secondary to acute on chronic diastolic congestive heart failure, hospital-acquired pneumonia, pulmonary embolism.  Patient states \"I feel much better\"    Patient is feeling much better overall.  His breathing is improved dramatically.  He is down to 4 L/min.  He has been off high flow for a number of days.  Net output is 21 L.  He is tentatively scheduled for cardiac catheterization a month.  Family is at the bedside, multiple questions answered.  At this point in time neither patient nor family have any questions or 
Home Oxygen Evaluation    Home Oxygen Evaluation completed.    Patient is on 4.5 liters per minute via cannula.  Resting SpO2 = 93%  Resting SpO2 on room air = 88%    SpO2 on room air with exercise = 85%  SpO2 on oxygen as above with exercise = 92%        Sandra Talley RCP  10:58 AM  
[facility]   Pharmacy Pharmacokinetic Monitoring Service - Vancomycin    Indication: HAP (nosocomial)   Target Concentration: Goal AUC/TALITA 400-600 mg*hr/L  Day of Therapy: 2  Additional Antimicrobials: cefepime    Pertinent Laboratory Values:   Wt Readings from Last 1 Encounters:   02/22/25 98 kg (216 lb 0.8 oz)     Temp Readings from Last 1 Encounters:   02/24/25 98 °F (36.7 °C) (Oral)     Estimated Creatinine Clearance: 56 mL/min (A) (based on SCr of 1.7 mg/dL (H)).  Recent Labs     02/22/25  1844 02/23/25  0342 02/24/25  0557   CREATININE 1.4*  --  1.7*   BUN 27*  --  45*   WBC 26.4* 30.7* 22.4*       Pertinent Cultures:  Culture Date Source Results   - - -   MRSA Nasal Swab: not ordered. Order placed by pharmacy.    Recent vancomycin administrations                     vancomycin (VANCOCIN) 1,000 mg in sodium chloride 0.9 % 250 mL IVPB ([id number]) (mg) 1,000 mg New Bag 02/23/25 2201    vancomycin (VANCOCIN) 1,500 mg in sodium chloride 0.9 % 250 mL IVPB ([id number]) (mg) 1,500 mg New Bag 02/23/25 1153                    Assessment:  Date/Time Current Dose Concentration Timing of Concentration (h) AUC   2/24 0557 1000mg q12h 20.8 7h 56min ~818   Note: Serum concentrations collected for AUC dosing may appear elevated if collected in close proximity to the dose administered, this is not necessarily an indication of toxicity    Plan:  Current dosing regimen is supra-therapeutic  \"Decrease dose to Vancomycin 1250 mg q24h starting at 2200. Predicted AUC of ~531 and trough of 15.2  Repeat vancomycin concentration ordered when appropriate   Pharmacy will continue to monitor patient and adjust therapy as indicated    Thank you for the consult,  ISMA MIMS RPH  2/24/2025 9:54 AM   
[facility]   Pharmacy Pharmacokinetic Monitoring Service - Vancomycin     Kiel Day is a 59 y.o. male starting on vancomycin therapy for HAP (nosocomial). Pharmacy consulted by Dr. Cary for monitoring and adjustment.    Target Concentration: Goal AUC/ATLITA 400-600 mg*hr/L    Additional Antimicrobials: azithromycin & doxycycine x1; cefepime active    Pertinent Laboratory Values:   Wt Readings from Last 1 Encounters:   02/22/25 98 kg (216 lb 0.8 oz)     Temp Readings from Last 1 Encounters:   02/22/25 99 °F (37.2 °C) (Oral)     Estimated Creatinine Clearance: 68 mL/min (A) (based on SCr of 1.4 mg/dL (H)).  Recent Labs     02/22/25  1844 02/23/25  0342   CREATININE 1.4*  --    BUN 27*  --    WBC 26.4* 30.7*       Pertinent Cultures:  Culture Date Source Results   2/23 Blood, MRSA nares pending   MRSA Nasal Swab: was ordered by provider, awaiting results.    Plan:  Dosing recommendations based on Bayesian software  Start vancomycin 1500mg IV x1 dose, followed by 1000mg IV Q12 hours  Anticipated AUC of 590 and trough concentration of 17 at steady state  Renal labs as indicated   Vancomycin concentration will be ordered 2/24 @ 0600 with AM labs   Pharmacy will continue to monitor patient and adjust therapy as indicated    Thank you for the consult,    Patel Boo [facility]  2/23/2025 10:50 AM    
[facility]  Office: [phone]  Lemuel Miramontes DO, Galo Perez DO, Jensen Jacobo DO, Franky Snow DO, Caleb Rodriguez MD, Fatmata Purdy MD, Chyna Arauz MD, Sanam Gonzalez MD,  Speedy Matthew MD, Tl Santos MD, Bonnie Alexander MD,  Neil Trimble DO, Maryam English MD, Anurag Cary MD, Vipul Miramontes DO, Coby Rizzo MD,  Herman Junior DO, Yasmeen Paul MD, Verito Quezada MD, Ora Fairchild MD, Maria C Layton MD,  Jad Riley MD, Jessica Wheat MD, Paresh Stewart MD, Luis Weaver MD, Ramesh Bloom MD, Tommy Epps MD, Jose De Luna DO, Christopher Bocanegra MD, Neil Pedro MD, Mohsin Reza, MD, Shirley Waterhouse, CNP,  Lynette Evans CNP, Jose Berger, CNP,  Renetta Parisi, LEONARDO, Talia Baer, CNP, Stephanie Briggs, CNP, Kelli Angel, CNP, Saundra Franklin CNP, CINTHIA Bustillos-DIANNA, Betina Garcia, CNP, Cece Crawford, CNP,  Inessa Barr, CNP, Esrtellita Murcia, CNP, Diane Hernandez, CNP,  Anastasiya Castillo, CNS, Yoli Ivy CNP, Ritu Sarabia CNP,   Bina Monge, CNP         [facility]   IN-PATIENT SERVICE   [facility]    Progress Note    2/28/2025    1:03 PM    Name:   Kiel Day  MRN:     [id number]     Acct:      [id number]   Room:   [address] [place] [address] Day:  6  Admit Date:  2/22/2025  6:15 PM    PCP:   Angelita Walters MD  Code Status:  Full Code    Subjective:     Patient seen in follow-up for acute hypoxic respiratory failure secondary to diastolic congestive heart failure exacerbation, hospital-acquired pneumonia, pulmonary embolism.  Patient states \"I feel okay\"    Patient continues to have a robust diuresis.  He is a net 14.8 L output.  His breathing has improved and he is off high flow oxygen.  He is presently at 8 L/min.  Cardiology input noted, tentative plans for cardiac catheterization on Monday.  Patient himself denies any questions or concerns at this point in time.  He is maintained on a heparin drip due to the pulmonary embolism and 
[facility]  Office: [phone]  Lemuel Miramontes DO, Galo Perez DO, Jensen Jacobo DO, Franky Snow DO, Caleb Rodriguez MD, Fatmata Purdy MD, Chyna Arauz MD, Sanam Gonzalez MD,  Speedy Matthew MD, Tl Santos MD, Bonnie Alexander MD,  Neil Trimble DO, Maryam English MD, Anurag Cary MD, Vipul Miramontes DO, Coby Rizzo MD,  Herman Junior DO, Yasmeen Paul MD, Verito Quezada MD, Ora Fairchild MD, Maria C Layton MD,  Jad Riley MD, Jessica Wheat MD, Paresh Stewart MD, Luis Weaver MD, Ramesh Bloom MD, Tommy Epps MD, Jose De Luna DO, Christopher Bocanegra MD, Neil Pedro MD, Mohsin Reza, MD, Shirley Waterhouse, CNP,  Lynette Evans CNP, Jose Berger, CNP,  Renetta Parisi, LEONARDO, Talia Baer, CNP, Stephanie Briggs, CNP, Kelli Angel, CNP, Saundra Franklin CNP, CINTHIA Bustillos-DIANNA, Betina Garcia, CNP, Cece Crawford, CNP,  Inessa Barr, CNP, Estrellita Murcia, CNP, Diane Hernandez, CNP,  Anastasiya Castillo, CNS, Yoli Ivy CNP, Ritu Sarabia CNP,   Bina Monge, CNP         [facility]   IN-PATIENT SERVICE   [facility]    Progress Note    3/1/2025    1:13 PM    Name:   Kiel Day  MRN:     [id number]     Acct:      [id number]   Room:   [address] [place] [address] Day:  7  Admit Date:  2/22/2025  6:15 PM    PCP:   Angelita Walters MD  Code Status:  Full Code    Subjective:     Patient seen in follow-up for acute hypoxic respiratory failure secondary to hospital-acquired pneumonia, pulmonary embolism, and acute on chronic diastolic CHF exacerbation.  Patient states \"I feel better\"    Patient continues to show improvement.  He has been weaned to 4 L/min at this point in time.  We will continue with diuresis and I do note cardiology's plan for cardiac catheterization on Monday.  The patient denies any questions or concerns regarding this.  He denies any chest pain, breathing has improved.  He denies any nausea, vomiting, diarrhea.  He denies any fevers or chills.  
Notified Dr. Cary via secure message of pt elevated blood pressure.     See new orders.  
Occupational Therapy    [facility]  Occupational Therapy Not Seen Note    DATE: 2025    NAME: Kiel Day  MRN: [id number]   : 1965      Patient not seen this date for Occupational Therapy due to:    RN deferred OT eval at this time. Pt currently on 60L high flow at 100%. Increase pain and pt just given Dilaudid. Pt not appropriate at this time. Will continue to pursue OT eval.     Electronically signed by ROMINA BYRNE OTR/L on 2025 at 10:53 AM   
Patient received post procedure to pacu 14. Assessment obtained.  Post cath pathway initiated. Right wrist site with vascband intact. No hematoma noted. Restrictions reviewed with patient and friend Gregoria.  Patient without complaints.        
Patient transferred to room 340 per bed.  
Physical Therapy        Physical Therapy Cancel Note      DATE: 2025    NAME: Kiel Day  MRN: [id number]   : 1965      Patient not seen this date for Physical Therapy due to:    Patient Declined: Pt decline PT treatment this morning due to lower half of leg/feet pain and c/o increased edema. States R trunk pain increased to 6/10. He is agreeable w/ visit in afternoon. RN notified and plans offer pain meds. PLAN: continue to pursue PT treatment as able.      Electronically signed by Vanesa Issa PT on 2025 at 11:51 AM      
Physical Therapy        Physical Therapy Cancel Note      DATE: 2025    NAME: Kiel Day  MRN: [id number]   : 1965      Patient not seen this date for Physical Therapy due to:    RN deferred PT eval at this time. Pt currently on 60L high flow at 100%. Increase pain and pt just given Dilaudid. Pt not appropriate at this time. Will continue to pursue PT eval.       Electronically signed by LOUANN GUERRIER PT on 2025 at 8:44 AM     
Physical Therapy        Physical Therapy Cancel Note      DATE: 2025    NAME: Kiel Day  MRN: [id number]   : 1965      Patient not seen this date for Physical Therapy due to:    Patient Declined: PT visit pt room. Pt reports he hasn't been able to sleep and was just beginning to fall asleep. Pt request defer PT this morning, but OK visit in afternoon.  PLAN: continue to pursue PT treatment as able.       Electronically signed by Vanesa Issa PT on 2025 at 9:44 AM      
Physical Therapy  Facility/Department: [address]   Physical Therapy Daily Treatment Note    Patient Name: Kiel Day        MRN: [id number]    : 1965    Date of Service: 2025    Chief Complaint   Patient presents with    Shortness of Breath     Breathing difficulty and right sided rib pains.  Recent admit to hospital and discharge 3 days ago.,       Past Medical History:  has a past medical history of Asthma, COPD (chronic obstructive pulmonary disease) (HCC), Diabetes mellitus (HCC), Hyperlipidemia, and Hypertension.  Past Surgical History:  has a past surgical history that includes Cholecystectomy; Foot surgery (Right); Wrist surgery (Right); Elbow surgery (Bilateral); Mandible surgery; Clavicle surgery (Left); and Eye surgery (Right, 2024).    Discharge Recommendations  Discharge Recommendations: No therapy recommended at discharge, Continue to assess pending progress  PT Equipment Recommendations  Equipment Needed: No  Other: has Inogen home O2    Assessment  Body Structures, Functions, Activity Limitations Requiring Skilled Therapeutic Intervention: Decreased functional mobility ;Decreased ROM;Decreased balance;Decreased endurance;Decreased posture;Decreased high-level IADLs;Increased pain  Assessment: Pt lives w/ S.O. in mobile home and normally independent w/out device and was working until 25 admit following a syncopal episode w/ diagnosis influenza and discharge 25 with home O2. Pt redmitted 25 w/ acute hypoxemic respiratory failure and acute thromboembolic PE. At PT follow up, pt was seated EOB w/ c/o 3/10 R trunk pain. Prior mod A to get legs into bed w/ R>L LE edema. He transfers SBA RW and gt 10ft forward and 10ft back for 3 laps w/ heated high flow 25% and multiple line assist. O2 saturation in 90s. Pt should be able to safely return home pending medical progress. He would currently benefit from acute PT to promote mobility, gait and step training to promote 
[facility]  Office: [phone]  Lemuel Miramontes DO, Galo Perez DO, Jensen Jacobo DO, Franky Snow DO, Caleb Rodriguez MD, Fatmata Purdy MD, Chyna Arauz MD, Sanam Gonzalez MD,  Spedey Matthew MD, Tl Santos MD, Bonnie Alexander MD,  Neil Trimble DO, Maryam English MD, Anurag Cary MD, Vipul Miramontes DO, Coby Rizzo MD,  Herman Junior DO, Yasmeen Paul MD, Verito Quezada MD, Ora Fairchild MD, Maria C Layton MD,  Jad Riley MD, Jessica Wheat MD, Paresh Stewart MD, Luis Weaver MD, Ramesh Bloom MD, Tommy Epps MD, Jose De Luna DO, Christopher Bocanegra MD, Neil Pedro MD, Mohsin Reza, MD, Shirley Waterhouse, CNP,  Lnyette Evans CNP, Jose Berger, CNP,  Renetta Parisi, LEONARDO, Talia Baer, CNP, Stephanie Briggs, CNP, Kelli Angel, CNP, Saundra Franklin CNP, CINTHIA Bustillos-DIANNA, Betina Garcia, CNP, Cece Crawford, CNP,  Inessa Barr, CNP, Estrellita Murcia, CNP, Diane Hernandez, CNP,  Anastasiya Castillo, CNS, Yoli Ivy CNP, Ritu Sarabia CNP,   Bina Monge, CNP         [facility]   IN-PATIENT SERVICE   [facility]    Progress Note    2/26/2025    12:11 PM    Name:   Kiel Day  MRN:     [id number]     Acct:      [id number]   Room:   [address] [place] [address] Day:  4  Admit Date:  2/22/2025  6:15 PM    PCP:   Angelita Walters MD  Code Status:  Full Code    Subjective:     Patient seen in follow-up for acute on chronic hypoxic failure secondary to diastolic CHF exacerbation, hospital-acquired pneumonia, pulmonary embolism.  Patient states \"I am feeling better\"    Patient states that he had a significant mount of cough and productive sputum overnight.  Subjectively he is feeling significantly better on a physical examination his lung fields are quite clear.  Cardiology, pulmonology input noted and appreciated.  He is still requiring a significant amount of supplemental oxygen and is on high flow at the time of my examination.  Once his respiratory status is 
RN provided dc instructions and all questions answered. IV and Tele removed. Writer addressed pts concerns for affording medications with CM. Additionally, pt states they no longer have working glucometer. MD notified and addressed issue. Pt made aware. All personal belongings returned to pt and they were assisted downstairs via wheelchair. Off the floor at 1720  
RT Inhaler-Nebulizer Bronchodilator Protocol Note    There is a bronchodilator order in the chart from a provider indicating to follow the RT Bronchodilator Protocol and there is an “Initiate RT Inhaler-Nebulizer Bronchodilator Protocol” order as well (see protocol at bottom of note).    CXR Findings:  No results found.    The findings from the last RT Protocol Assessment were as follows:   History Pulmonary Disease: Chronic pulmonary disease  Respiratory Pattern: Mild dyspnea at rest, irregular pattern, or RR 21-25 bpm  Breath Sounds: Slightly diminished and/or crackles  Cough: Strong, productive  Indication for Bronchodilator Therapy: Decreased or absent breath sounds  Bronchodilator Assessment Score: 13    Aerosolized bronchodilator medication orders have been revised according to the RT Inhaler-Nebulizer Bronchodilator Protocol below.    Respiratory Therapist to perform RT Therapy Protocol Assessment initially then follow the protocol.  Repeat RT Therapy Protocol Assessment PRN for score 0-3 or on second treatment, BID, and PRN for scores above 3.    No Indications - adjust the frequency to every 6 hours PRN wheezing or bronchospasm, if no treatments needed after 48 hours then discontinue using Per Protocol order mode.     If indication present, adjust the RT bronchodilator orders based on the Bronchodilator Assessment Score as indicated below.  Use Inhaler orders unless patient has one or more of the following: on home nebulizer, not able to hold breath for 10 seconds, is not alert and oriented, cannot activate and use MDI correctly, or respiratory rate 25 breaths per minute or more, then use the equivalent nebulizer order(s) with same Frequency and PRN reasons based on the score.  If a patient is on this medication at home then do not decrease Frequency below that used at home.    0-3 - enter or revise RT bronchodilator order(s) to equivalent RT Bronchodilator order with Frequency of every 4 hours PRN for 
Spiritual Health History and Assessment/Progress Note  [facility]    (P) Advance Care Planning,  ,  ,      Name: Kiel Day MRN: [id number]    Age: 59 y.o.     Sex: male   Language: English   Buddhist: None   Acute hypoxic respiratory failure (HCC)     Date: 2025            Total Time Calculated: (P) 30 min              Spiritual Assessment began in [facility]        Referral/Consult From: (P) Family, Patient   Encounter Overview/Reason: (P) Advance Care Planning  Service Provided For: (P) Patient and family together    Linda, Belief, Meaning:   Patient unable to assess at this time  Family/Friends Other: unable to assess at this time      Importance and Influence:  Patient has no beliefs influential to healthcare decision-making identified during this visit  Family/Friends have no beliefs influential to healthcare decision-making identified during this visit    Community:  Patient feels well-supported. Support system includes: Spouse/Partner, Extended family, and Other: Pet dog  Family/Friends feel well-supported. Support system includes: Spouse/Partner and Extended family    Assessment and Plan of Care:   Patient was joined by his Partner, Laura. Pt reported that he was in pain. Pt had received care from his nurse about him Pt spoke highly. Pt accessed his sense of humor when choosing to speak. Pt affirmed his reliance upon linda and belief in prayer. Domestic Partner shared that she lost her mother recently and that Pt's mother's  was today. DP was tearful as she shared about her losses. Pt and DP spoke of their son and DP's daughter as additional family members.      Patient Interventions include: Facilitated expression of thoughts and feelings, Explored spiritual coping/struggle/distress, and Affirmed coping skills/support systems Offered words of support and encouragement.   Family/Friends Interventions include: Facilitated expression of thoughts and feelings and Other: 
Undersigned called lab d/t Anti-XA not resulted and drawn at 0015. Per lab will be able to run the lab after machine is Qc'd currently running controls on machine.  
[facility]  Office: [phone]  Lemuel Miramontes DO, Galo Perez DO, Jensen Jacobo DO, Franky Snow DO, Caleb Rodriguez MD, Fatmata Purdy MD, Chyna Arauz MD, Sanam Gonzalez MD,  Speedy Matthew MD, Tl Santos MD, Bonnie Alexander MD,  Neil Trimble DO, Maryam English MD, Anurag Cary MD, Vipul Miramontes DO, Coby Rizzo MD,  Herman Junior DO, Yasmeen Paul MD, Verito Quezada MD, Ora Fairchild MD, Maria C Layton MD,  Jad Riley MD, Jessica Wheat MD, Paresh Stewart MD, Luis Weaver MD, Ramesh Bloom MD, Tommy Epps MD, Jose De Luna DO, Christopher Bocanegra MD, Neil Pedro MD, Mohsin Reza, MD, Shirley Waterhouse, CNP,  Lynette Evans CNP, Jose Berger, CNP,  Renetta Parisi, LEONARDO, Talia Baer, CNP, Stephanie Briggs, CNP, Kelli Angel, CNP, Saundra Franklin CNP, CINTHIA Bustillos-DIANNA, Betina Garcia, CNP, Cece Crawford, CNP,  Inessa Barr, CNP, Estrellita Murcia, CNP, Diane Hernandez, CNP,  Anastasiya Castillo, CNS, Yoli Ivy CNP, Ritu Sarabia CNP,   Bina Monge, CNP         [facility]   IN-PATIENT SERVICE   [facility]    Progress Note    3/4/2025    9:35 AM    Name:   Kiel Day  MRN:     [id number]     Acct:      [id number]   Room:   [address] [place] [address] Day:  10  Admit Date:  2/22/2025  6:15 PM    PCP:   Angelita Walters MD  Code Status:  Full Code    Subjective:     Patient was seen in follow-up for acute hypoxic respiratory failure secondary to an acute heart failure exacerbation, pneumonia and acute pulmonary embolism. He reports feeling \"not good\" and is complaining of intractable RUQ pain. The patient states that this pain is not new and says that it started before admission. Plan to obtain a CT abdomen and pelvis and monitor for improvement.     Medications:     Allergies:    Allergies   Allergen Reactions    Penicillins        Current Meds:   Scheduled Meds:    furosemide  40 mg Oral BID    amLODIPine  10 mg Oral Daily    sodium chloride flush  
Writer witnessed Pt take a drink while wearing his BIPAP mask. Pt was reminded he shouldn't do this due to the risk of aspiration. Pt also has NOT slept all night.  
  in context of acute illness or injury related to impaired respiratory function, endocrine dysfunction, cardiac dysfunction as evidenced by localized or generalized fluid accumulation, lab values    Nutrition Interventions:   Food and/or Nutrient Delivery: Continue Current Diet  Nutrition Education/Counseling: No recommendation at this time  Coordination of Nutrition Care: Continue to monitor while inpatient, Interdisciplinary Rounds  Plan of Care discussed with: IDT    Goals:  Goals: PO intake 75% or greater  Type of Goal: New goal       Nutrition Monitoring and Evaluation:   Behavioral-Environmental Outcomes: None Identified  Food/Nutrient Intake Outcomes: Food and Nutrient Intake  Physical Signs/Symptoms Outcomes: Fluid Status or Edema, Meal Time Behavior, Weight    Discharge Planning:    No discharge needs at this time     [address]  Contact: [phone]    
None  How much help for eating meals?: None  AM-PAC Inpatient Daily Activity Raw Score: 24  AM-PAC Inpatient ADL T-Scale Score : 57.54  ADL Inpatient CMS 0-100% Score: 0  ADL Inpatient CMS G-Code Modifier : CH    Restrictions/Precautions  Restrictions/Precautions  Restrictions/Precautions: General Precautions  Activity Level: Up as Tolerated  Required Braces or Orthoses?: No  Position Activity Restriction  Other Position/Activity Restrictions: high flow O2       Subjective  General  Patient assessed for rehabilitation services?: Yes  Family / Caregiver Present: No  Subjective  Subjective: Patient reports pain R side at 7/10. Patient positioned in recliner chair, pillow behind back and ice for R side for increased comfort.  General Comment  Comments: RN OK for EVAL. Patient agreeable to therapy and cooperative throughout session.          Home Setup/Prior Level of Function  Social/Functional History  Lives With: Significant other  Type of Home: Mobile home  Home Layout: One level  Home Access: Stairs to enter with rails  Entrance Stairs - Number of Steps: 5  Entrance Stairs - Rails: Left  Bathroom Shower/Tub: Walk-in shower  Bathroom Toilet: Handicap height  Bathroom Equipment: Grab bars in shower;Built-in shower seat;Hand-held shower  Home Equipment: Walker - Rolling  Has the patient had two or more falls in the past year or any fall with injury in the past year?: Yes (this admit)  Prior Level of Assist for ADLs: Independent  Prior Level of Assist for Homemaking: Independent (share laundry, cooking)  Prior Level of Assist for Ambulation: Independent household ambulator, with or without device;Independent community ambulator, with or without device (no device)  Prior Level of Assist for Transfers: Independent  Active : Yes  Mode of Transportation: Car  Occupation: Full time employment  Type of Occupation: [employer]  Leisure & Hobbies: work, grandbaby    Vision/Hearing  Vision  Vision: 
determine / Unknown  -- Refer to Clinical Documentation Reviewer    PROVIDER RESPONSE TEXT:    After study NSTEMI ruled out and demand ischemia confirmed    Query created by: Rhonda Garg on 3/4/2025 2:11 PM      Electronically signed by:  Anurag Cary MD 3/4/2025 2:18 PM          
discharge home goal.  Therapy Prognosis: Good  Decision Making: Medium Complexity  Requires PT Follow-Up: Yes  Activity Tolerance  Activity Tolerance: Patient tolerated treatment well  Activity Tolerance Comments: limited mobility due to high flow O2 line but able to walk farther and keep O2 sat 90%-93%; decreased R flank pain this afternoon after meds in morning  Safety Devices  Type of Devices: Call light within reach;Gait belt;Nurse notified;Left in chair (sit EOB w/ RW UE support for R flank pain control)  Restraints  Restraints Initially in Place: No    AM-PAC       Restrictions/Precautions  Restrictions/Precautions  Restrictions/Precautions: General Precautions  Activity Level: Up as Tolerated  Required Braces or Orthoses?: No  Position Activity Restriction  Other Position/Activity Restrictions: high flow O2;HOB 30dg       Subjective  General  Patient assessed for rehabilitation services?: Yes  Response To Previous Treatment: Patient with no complaints from previous session.  Family/Caregiver Present: No  General  General Comments: Cardiac catheterization planned for Monday.  Subjective  Subjective: RN & pt agreeable to PT. Pt seated in recliner upon arrival and report pain 0/10. Pt positioned for comfort in recliner at end of session.       Objective  Orientation  Overall Orientation Status: Within Normal Limits  Orientation Level: Oriented X4  Cognition  Overall Cognitive Status: Exceptions  Arousal/Alertness: Appears intact  Following Commands: Appears intact  Attention Span: Appears intact  Memory: Appears intact  Safety Judgement: Appears intact  Problem Solving: Assistance required to generate solutions;Assistance required to identify errors made  Insights: Decreased awareness of deficits  Initiation: Appears intact  Sequencing: Appears intact                   Mobility   Bed mobility  Bed Mobility Comments: pt in recliner upon arrival and return to recliner at end of session         Transfers  Sit to 
List:     Diastasis recti     Controlled type 2 diabetes mellitus without complication, without long-term current use of insulin (HCC)     Essential hypertension     Tobacco use     Smooth hepatomegaly     Hepatic steatosis     RUQ pain     Chest pain at rest     Chest pain of uncertain etiology     Mixed hyperlipidemia     Syncope     NSTEMI (non-ST elevated myocardial infarction) (HCC)     Hypertensive urgency     Influenza A     Acute hypoxic respiratory failure (HCC)     HCAP (healthcare-associated pneumonia)     Acute pulmonary embolism without acute cor pulmonale (HCC)     Acute on chronic heart failure with preserved ejection fraction (HCC)     Hypoxemia        Plan:   Continue IV diuresis with Lasix 40 mg twice daily.  Close monitoring of renal parameters.  Aspirin, statin, Coreg and spironolactone  Therapeutic anticoagulation with heparin infusion  Broad-spectrum antibiotics  Wean off FiO2 as tolerated  Will proceed with cardiac catheterization once respiratory status is more compensated.        Reed Robert MD [facility] [place]                   
at 2/25/2025 0932  Gross per 24 hour   Intake --   Output 3450 ml   Net -3450 ml       Labs:  Hematology:  Recent Labs     02/23/25  0342 02/24/25  0557 02/25/25  0559   WBC 30.7* 22.4* 20.9*   RBC 3.98* 3.69* 3.87*   HGB 12.3* 11.3* 12.1*   HCT 36.1* 33.8* 35.4*   MCV 90.7 91.3 91.5   MCH 30.9 30.6 31.4   MCHC 34.1 33.5 34.3   RDW 12.7 12.6 12.6    245 268   MPV 8.7 8.8 8.7     Chemistry:  Recent Labs     02/22/25  1844 02/24/25  0557 02/25/25  0559   * 133* 135   K 4.9 4.8 4.7   CL 92* 96* 96*   CO2 29 26 29   GLUCOSE 171* 254* 184*   BUN 27* 45* 38*   CREATININE 1.4* 1.7* 1.5*   ANIONGAP 13 11 10   LABGLOM 58* 46* 53*   CALCIUM 9.2 8.3* 9.0   PROBNP 1,953* 688*  --    TROPHS 57*  --   --      Recent Labs     02/22/25  1844 02/23/25  1739 02/24/25  0557 02/24/25  0834 02/24/25  1126 02/24/25  1619 02/24/25  2041 02/25/25  0559 02/25/25  0727 02/25/25  1114 02/25/25  1206   AST 15  --  21  --   --   --   --  21  --   --   --    ALT 21  --  25  --   --   --   --  37  --   --   --    ALKPHOS 87  --  92  --   --   --   --  95  --   --   --    BILITOT 0.5  --  0.2*  --   --   --   --  0.3  --   --   --    POCGLU  --    < >  --    < > 177* 218* 201*  --  176* 246* 255*    < > = values in this interval not displayed.     ABG:  Lab Results   Component Value Date/Time    POCPH 7.396 02/24/2025 11:59 AM    POCPCO2 49.0 02/24/2025 11:59 AM    POCPO2 63.8 02/24/2025 11:59 AM    POCHCO3 30.1 02/24/2025 11:59 AM    PBEA 4.2 02/24/2025 11:59 AM    PCGP1ENP 91.6 02/24/2025 11:59 AM    FIO2 70.0 02/24/2025 11:59 AM     Lab Results   Component Value Date/Time    SPECIAL 20ML RIGHT AC 02/22/2025 06:47 PM    SPECIAL 10ML LEFT AC 02/22/2025 06:47 PM     Lab Results   Component Value Date/Time    CULTURE NO GROWTH 2 DAYS 02/22/2025 06:47 PM    CULTURE NO GROWTH 2 DAYS 02/22/2025 06:47 PM       Radiology:  XR CHEST PORTABLE    Result Date: 2/24/2025  Nonspecific lower lobe airspace opacities, right greater than left     CT 
diabetes mellitus without complication, without long-term current use of insulin (HCC)     Essential hypertension     Tobacco use     Smooth hepatomegaly     Hepatic steatosis     RUQ pain     Chest pain at rest     Chest pain of uncertain etiology     Mixed hyperlipidemia     Syncope     NSTEMI (non-ST elevated myocardial infarction) (HCC)     Hypertensive urgency     Influenza A     Acute hypoxic respiratory failure (HCC)     HCAP (healthcare-associated pneumonia)     Acute pulmonary embolism without acute cor pulmonale (HCC)     Acute on chronic heart failure with preserved ejection fraction (HCC)     Hypoxemia        Plan:   Continue IV diuresis with Lasix 40 mg twice daily.  Close monitoring of renal parameters.  Aspirin, statin, Coreg and spironolactone  Therapeutic anticoagulation with heparin infusion  Broad-spectrum antibiotics  Wean off FiO2 as tolerated  Will proceed with cardiac catheterization once respiratory status is more compensated, off high flow oxygen and able to lay flat, I anticipate on Monday am.     Case was Discussed with Dr. Dane Robert MD [facility] [place]                   
25 Minutes    Electronically signed by LOUANN GUERRIER, PT on 3/4/25 at 4:16 PM EST     
4 mg at 02/24/25 1149 **OR** ondansetron (ZOFRAN) injection 4 mg, 4 mg, IntraVENous, Q6H PRN, Anurag Cary MD    polyethylene glycol (GLYCOLAX) packet 17 g, 17 g, Oral, Daily PRN, Anurag Cary MD    acetaminophen (TYLENOL) tablet 650 mg, 650 mg, Oral, Q6H PRN, 650 mg at 02/28/25 1315 **OR** acetaminophen (TYLENOL) suppository 650 mg, 650 mg, Rectal, Q6H PRN, Anurag Cary MD    HYDROmorphone (DILAUDID) injection 1 mg, 1 mg, IntraVENous, Q4H PRN, Santa Arriaza MD, 1 mg at 03/03/25 1442    aspirin chewable tablet 81 mg, 81 mg, Oral, Daily, Santa Arriaza MD, 81 mg at 03/04/25 0845    budesonide-formoterol (SYMBICORT) 160-4.5 MCG/ACT inhaler 2 puff, 2 puff, Inhalation, BID RT, 2 puff at 03/04/25 0902 **AND** [DISCONTINUED] tiotropium (SPIRIVA RESPIMAT) 2.5 MCG/ACT inhaler 2 puff, 2 puff, Inhalation, Daily RT, Santa Arriaza MD    ipratropium 0.5 mg-albuterol 2.5 mg (DUONEB) nebulizer solution 1 Dose, 1 Dose, Inhalation, Q4H PRN, Santa Arriaza MD, 1 Dose at 02/25/25 0335    Lab Results:     Lab Results   Component Value Date    WBC 11.5 (H) 03/03/2025    HGB 10.3 (L) 03/03/2025    HCT 30.7 (L) 03/03/2025    MCV 91.2 03/03/2025     03/03/2025     Lab Results   Component Value Date    CALCIUM 8.6 03/04/2025     03/04/2025    K 4.9 03/04/2025    CO2 31 03/04/2025    CL 99 03/04/2025    BUN 40 (H) 03/04/2025    CREATININE 1.2 03/04/2025     Lab Results   Component Value Date    INR 1.7 (H) 02/25/2025    PROTIME 19.4 (H) 02/25/2025       Radiology:     No new chest imaging today  ASSESSMENT:       Acute on chronic hypoxic respite failure, recently discharged on 2 L with activity, now between high flow and BiPAP for respiratory support  Acute on chronic heart failure preserved EF with diastolic dysfunction, elevated proBNP, lower extremity swelling, dyspnea with hypoxia  Mucous plugging the right middle lobe  Acute PE this admission; there was concern for a PE at the last admission on VQ scan, however CTA 
II  Mild mitral regurgitation  Obesity  Full code    ASSESSMENT/PLAN   Continue cefepime for now - on day 6  Follow sputum culture results  Continue Mucinex and encourage flutter valve therapy  Wean oxygen as tolerated, maintain saturations >/= 88%  Alternating between HFNC and BiPAP at night  RLE Doppler negative for DVT; incidentally found occlusion of right SFA  Cardiology consulted -currently on heparin, recommend cath this admission  Continue diuresis IV with close monitoring of renal function  Continue Duoneb and Symbicort/Spiriva  Daily weight with strict I&Os  Would repeat chest x-ray in the a.m.-order has been placed  Strong recommendation for LTAC upon DC  Plan to be discussed with Dr. Vela, attending    Huang Vela MD  Family Medicine Resident, PGY-3   02/27/25    Patient seen and examined independently by me. Above discussed and I agree with resident note except where indicated in the EMR revision history. Also see my additional comments and changes indicated by discrete font, text color, italics, and/or initials. Labs, cultures, and radiographs where available were reviewed.     Has significant improved over last 24 hours  Down to 8 L nasal cannula  Says that he was wearing BiPAP at night and was able to lay flat but unclear if he was truly flat  Tonight, I told him to lay flat (as if he was having a cardiac catheterization)  We may be approaching the window for cardiac catheterization-if that is decided, then I would have him wear BiPAP during the procedure    Electronically signed by Wander Vela MD on 2/27/2025 at 3:32 PM      
Mucinex and encourage flutter valve therapy  Wean oxygen as tolerated, maintain saturations >/= 88%  Alternating between HFNC and BiPAP  RLE Doppler negative for DVT; incidentally found occlusion of right SFA  Cardiology consulted - switch to heparin infusion tonight, recommend cath this admission  Continue diuresis IV with close monitoring of renal function  Continue Duoneb and Symbicort/Spiriva  Daily weight with strict I&Os  Plan to be discussed with Dr. Vela, attending    Patel Melgoza DO, PGY1  [facility] Residency  2/25/2025  12:06 PM      Patient seen and examined independently by me. Above discussed and I agree with resident note except where indicated in the EMR revision history. Also see my additional comments and changes indicated by discrete font, text color, italics, and/or initials. Labs, cultures, and radiographs where available were reviewed.     He still remains on high flow 45 L and 65% FiO2.  He is using his BiPAP at night where his FiO2 is 50%.  He has not been able to lay flat and I am not sure if he will be able to do so for the cardiac catheterization    He still has a lot of wheezing on exam and upper airway noise.  We will place him on Mucomyst with albuterol aerosols every 4 hours while awake and stop DuoNeb aerosols  I will try to be aggressive about weaning his oxygen keep his saturations just above 88%    Once again encouraged him to use his flutter valve therapy    Electronically signed by Wander Vela MD on 2/25/2025 at 5:47 PM    
Therapy;Transfer Training;Equipment;Plan of Care;Fall Prevention Strategies;Mobility Training  Education Provided Comments: need to elevated LEs; gait help w/ heelcord stretch and LE swelling; RW help w/ posture and breathing w/ gait  Education Method: Demonstration;Verbal;Teach Back  Barriers to Learning: None  Education Outcome: Continued education needed;Verbalized understanding;Demonstrated understanding    Plan  Physical Therapy Plan  General Plan:  (5-6x/wk)  Current Treatment Recommendations: Balance training, Functional mobility training, Gait training, Stair training, Endurance training, Home exercise program, Therapeutic activities    Goals  Patient Goals   Patient Goals : return home  Short Term Goals  Time Frame for Short Term Goals: 14  Short Term Goal 1: Pt will be independent bed mobility.  Short Term Goal 2: Pt will be independent transfers  Short Term Goal 3: Pt will be independent gait 200ft w/ supplemental O2 and least restrictive device.  Short Term Goal 4: Pt will be independent up/down platform step w/ supplemental O2 and least restrictive device.  Short Term Goal 5: Pt will be independent up/down 4 steps with rail w/ supplemental O2 and least restrictive device.  Additional Goals?: Yes  Short Term Goal 6: Pt will walk 80ft w/ no device but independent O2 management.    Minutes  PT Individual Minutes  Time In: 1338  Time Out: 1416  Minutes: 38    Electronically signed by Vanesa Issa PT on 2/25/25 at 2:27 PM EST      
activities    Goals  Patient Goals   Patient Goals : return home  Short Term Goals  Time Frame for Short Term Goals: 14  Short Term Goal 1: Pt will be independent bed mobility.  Short Term Goal 2: Pt will be independent transfers  Short Term Goal 3: Pt will be independent gait 200ft w/ supplemental O2 and least restrictive device.  Short Term Goal 4: Pt will be independent up/down platform step w/ supplemental O2 and least restrictive device.  Short Term Goal 5: Pt will be independent up/down 4 steps with rail w/ supplemental O2 and least restrictive device.  Additional Goals?: Yes  Short Term Goal 6: Pt will walk 80ft w/ no device but independent O2 management.    Minutes  PT Individual Minutes  Time In: 1406  Time Out: 1430  Minutes: 24  Time Code Minutes  Timed Code Treatment Minutes: 24 Minutes    Electronically signed by Vanesa Issa PT on 2/27/25 at 2:45 PM EST      
consolidation. Findings are most c/w infectious/inflammatory etiology. 3. COPD changes. 4. Mild bilateral perinephric fat stranding, greater on the left; medial interpolar cortical low-density focus noted right kidney, measuring 1.6 cm and likely a cyst.  Consider nonemergent follow-up ultrasound. 5. Probable mild gynecomastia, greater on the right. 6. Additional findings, as above.     XR CHEST PORTABLE    Result Date: 2/17/2025  Improved left basilar opacification, possibly resolving infiltrate or atelectasis.  Small left pleural effusion.     NM LUNG SCAN PERFUSION ONLY    Addendum Date: 2/17/2025    ADDENDUM: Critical results were called by Dr. Timmy Paul to CONSTAN[facility] OWENS on 2/17/2025 at 12:03.     Result Date: 2/17/2025  Findings positive for acute pulmonary thromboembolism according to the PISAPED criteria.       Physical Examination:     General appearance:  alert, cooperative and no distress  Mental Status:  oriented to person, place and time and normal affect  Lungs:  Diminished breath sounds present bilaterally.   Heart:  regular rate and rhythm, no murmur  Abdomen:  soft, nontender, nondistended, normal bowel sounds, no masses, hepatomegaly, splenomegaly  Extremities:  2+ pitting edema in the BLLE   Skin:  no gross lesions, rashes, induration    Assessment:     Hospital Problems             Last Modified POA    * (Principal) Acute hypoxic respiratory failure (HCC) 2/22/2025 Yes    Controlled type 2 diabetes mellitus without complication, without long-term current use of insulin (HCC) (Chronic) 2/23/2025 Yes    Essential hypertension (Chronic) 2/23/2025 Yes    Mixed hyperlipidemia 2/23/2025 Yes    HCAP (healthcare-associated pneumonia) 2/23/2025 Yes    Acute pulmonary embolism without acute cor pulmonale (HCC) 2/23/2025 Yes    Acute on chronic heart failure with preserved ejection fraction (HCC) 2/23/2025 Yes       Plan:     HCAP   -Cefepime 2 grams q12h   -Pharmacy to dose vancomycin   -Blood 
nondistended, normal bowel sounds, no masses, hepatomegaly, splenomegaly  Extremities: Edema improved  Skin:  no gross lesions, rashes, induration    Assessment:     Hospital Problems             Last Modified POA    * (Principal) Acute hypoxic respiratory failure (HCC) 2/22/2025 Yes    NSTEMI (non-ST elevated myocardial infarction) (HCC) 2/12/2025 Unknown    Controlled type 2 diabetes mellitus without complication, without long-term current use of insulin (HCC) (Chronic) 2/23/2025 Yes    Essential hypertension (Chronic) 2/23/2025 Yes    Mixed hyperlipidemia 2/23/2025 Yes    HCAP (healthcare-associated pneumonia) 2/23/2025 Yes    Acute pulmonary embolism without acute cor pulmonale (HCC) 2/23/2025 Yes    Acute on chronic heart failure with preserved ejection fraction ([facility]) 2/23/2025 Yes    Hypoxemia 2/25/2025 Yes       Plan:     Acute hypoxic respiratory failure  Multifactorial, continue to wean oxygen  Will need home oxygen evaluation  Acute on chronic diastolic congestive heart failure  Patient has been transition to oral diuresis  Net output 25.6 L  Hospital-acquired pneumonia  Status posttreatment, patient completed antibiotic course in the hospital  Elevated creatinine  Will need to be monitored carefully following cardiac catheterization to ensure patient does not develop dye induced nephropathy  Check morning labs  Pulmonary embolism  Transition to Eliquis following cardiac catheterization  Elevated troponin  Cardiac catheterization per cardiology  COPD  Continue Symbicort, no wheezing on exam  Diabetes mellitus  Vitals reviewed, blood sugar overall stable with a few outliers  Essential hypertension  Continue Coreg, Aldactone  Norvasc initiated per cardiology    Await cardiac catheterization, check morning labs    Medical Decision Making: Robbie Miramontes DO  3/3/2025  12:24 PM   
previous exam and similar opacities in left lung base not significantly changed.  Patchy consolidations in the lingula likely obscuring the previously identified tree-in-bud opacities.  Overall, this is worsened from 02/17/2025 and compatible with an infectious etiology. 3. Mucous plugging in the right middle and lower lobes new from the previous exam. 4. Trace right pleural effusion. 5. Mild mediastinal and right hilar lymphadenopathy likely reactive. Critical results were called by Dr. Jesu Darden MD to Dr. Amy Henderson of the [facility] emergency department on 2/22/2025 at 20:39.       Physical Examination:     General appearance:  alert, cooperative and no distress  Mental Status:  oriented to person, place and time and normal affect  Lungs:  clear to auscultation bilaterally, normal effort  Heart:  regular rate and rhythm, no murmur  Abdomen:  soft, nontender, nondistended, normal bowel sounds, no masses, hepatomegaly, splenomegaly  Extremities: Positive edema bilaterally  Skin:  no gross lesions, rashes, induration    Assessment:     Hospital Problems             Last Modified POA    * (Principal) Acute hypoxic respiratory failure (HCC) 2/22/2025 Yes    Controlled type 2 diabetes mellitus without complication, without long-term current use of insulin (HCC) (Chronic) 2/23/2025 Yes    Essential hypertension (Chronic) 2/23/2025 Yes    Mixed hyperlipidemia 2/23/2025 Yes    HCAP (healthcare-associated pneumonia) 2/23/2025 Yes    Acute pulmonary embolism without acute cor pulmonale (HCC) 2/23/2025 Yes    Acute on chronic heart failure with preserved ejection fraction (HCC) 2/23/2025 Yes    Hypoxemia 2/25/2025 Yes       Plan:     Acute hypoxic respiratory failure  Patient is showing significant improvement  Presently at 8 L/min, off high flow  Acute on chronic diastolic CHF exacerbation  Continue diuresis with Jardiance/Lasix  Net output over 10 L  Check BMP  Hospital-acquired pneumonia  Patient 
restrictive device.  Short Term Goal 4: Pt will be independent up/down platform step w/ supplemental O2 and least restrictive device.  Short Term Goal 5: Pt will be independent up/down 4 steps with rail w/ supplemental O2 and least restrictive device.  Additional Goals?: Yes  Short Term Goal 6: Pt will walk 80ft w/ no device but independent O2 management.    Minutes  PT Individual Minutes  Time In: 1529  Time Out: 1556  Minutes: 27  Time Code Minutes  Timed Code Treatment Minutes: 23 Minutes    Electronically signed by LOUANN GUERRIER PT on 3/3/25 at 4:06 PM EST     
swab is negative  Sputum culture if able  Continue Mucinex and encourage flutter valve therapy  Wean oxygen as tolerated, maintain saturations >/= 88%  Alternating between HFNC and BiPAP  AM CXR with bilateral lower lobe opacities  Consider holding a dose of Lasix given worsening FATOUMATA  Closely monitor renal function  Continue Duoneb and Symbicort/Spiriva  Daily weight with strict I&Os  Plan to be discussed with Dr. Vela, attending    Patel Melgoza DO, PGY1  [facility] Residency  2/24/2025  11:36 AM        Patient seen and examined independently by me. Above discussed and I agree with resident note except where indicated in the EMR revision history. Also see my additional comments and changes indicated by discrete font, text color, italics, and/or initials. Labs, cultures, and radiographs where available were reviewed.     He has multiple reasons for being hypoxic.  Currently on 45 L high flow with 70% FiO2.  Alternating with BiPAP when he is getting tired, or at night.    Continue cefepime for now since it appears that he does have a pneumonic infiltrate.  He will probably need an LTAC    I do not think he needs steroids, he is not bronchospastic I would continue DuoNebs and Symbicort for now.  On last admit, he was bronchospastic    He would benefit eventually from outpatient pulmonary function testing.    proBNP has improved as well    In terms of his pulmonary embolism, the etiology is unclear and I would treated as an unprovoked pulmonary embolism          Electronically signed by Wander Vela MD on 2/24/2025 at 6:02 PM          
Yes    Acute pulmonary embolism without acute cor pulmonale (HCC) 2/23/2025 Yes    Acute on chronic heart failure with preserved ejection fraction (HCC) 2/23/2025 Yes       Plan:     HCAP   -Cefepime 2 grams q12h   -Pharmacy to dose vancomycin   -Blood cultures pending x 2   -Strep pneumoniae antigen negative   -Legionella antigen pending   -Daily CBC   -Daily CMP     HFpEF   -Currently in acute exacerbation   -Continue IV Lasix 40 mg bid   -Start spironolactone 25 mg daily   -Start Jardiance   -Wean oxygen as tolerated   -Echocardiogram is showing preserved EF with abnormal diastolic function     Acute pulmonary embolism   -CTPA is showing a small subsegmental pulmonary embolism in the posterior basal segment of the RLL   -Start Eliquis     Right calf edema   -The patient has developed significant right calf edema since yesterday   -Doppler pending     COPD   -Q4H Duonebs   -Continue home Symbicort     DM2  -Sliding scale insulin   -Hypoglycemia protocol     HTN   -Continue home carvedilol 25 mg bid     HLD   -Continue home atorvastatin       Anurag Cary MD  2/24/2025  8:11 AM    
albuterol, previously had seen Dr. Leroy, but not in many years  History of GT, previously on CPAP, supposedly had lost significant amount of weight, was able to be taken off CPAP per her report, he previously did see Dr. Castro but prior to 2010  Essential hypertension  Diabetes mellitus type 2  NSTEMI last admission, mildly elevated troponins but no active angina, Dr. Robert did see the patient, thought to be type II  Mild mitral regurgitation  Obesity  Full code  PLAN:   Continue gentle diuresis  Wean off oxygen  Oral anticoagulation at any time from my perspective  Outpatient pulmonary follow-up encouraged  Hopefully discharge in the next 24 to 48 hours depending on oxygen needs  Home O2 assessment prior to discharge      Electronically signed by Óscar Otero MD on 03/03/25     This progress note was completed using a voice transcription system. Every effort was made to ensure accuracy. However, inadvertent computerized transcription errors may be present.    Óscar Otero MD  Pulmonary and Critical Care   [phone] [employer]  [phone] Cell  
due to multiple lines, need for mobility while in hospital  Education Method: Demonstration;Verbal;Teach Back  Barriers to Learning: None  Education Outcome: Continued education needed;Verbalized understanding;Demonstrated understanding    Plan  Physical Therapy Plan  General Plan:  (5-6x/wk)  Current Treatment Recommendations: Balance training, Functional mobility training, Gait training, Stair training, Endurance training, Home exercise program, Therapeutic activities    Goals  Patient Goals   Patient Goals : return home  Short Term Goals  Time Frame for Short Term Goals: 14  Short Term Goal 1: Pt will be independent bed mobility.  Short Term Goal 2: Pt will be independent transfers  Short Term Goal 3: Pt will be independent gait 200ft w/ supplemental O2 and least restrictive device.  Short Term Goal 4: Pt will be independent up/down platform step w/ supplemental O2 and least restrictive device.  Short Term Goal 5: Pt will be independent up/down 4 steps with rail w/ supplemental O2 and least restrictive device.  Additional Goals?: Yes  Short Term Goal 6: Pt will walk 80ft w/ no device but independent O2 management.    Minutes  PT Individual Minutes  Time In: 0904  Time Out: 0929  Minutes: 25  Time Code Minutes  Timed Code Treatment Minutes: 10 Minutes; coeval w/ OT    Electronically signed by Vanesa Issa PT on 2/24/25 at 11:09 AM EST      
  [phone]     This progress note was completed using a voice transcription system. Every effort was made to ensure accuracy. However, inadvertent computerized transcription errors may be present.  
about an hour last night; noted to be noncompliant  No change in mentation  Continues to be on Lasix IV 40 mg twice daily; defer management to cardiology  Monitor for volume contraction alkalosis, bicarb 31; kidney function stable  Net -21.7 L  24-hour urine output 4.1 L  Plans for transitioning to p.o. Lasix noted  We will continue to follow    Electronically signed by ARTHUR ALFREDO CNP on 03/02/25   [facility] Pulmonary, Critical Care & Sleep    Available via [employer]    [place] Office:  [address] , [place] [address] [phone]     [place] Office:  [facility] [address], [place] [address] [phone]     This progress note was completed using a voice transcription system. Every effort was made to ensure accuracy. However, inadvertent computerized transcription errors may be present.

## 2025-03-06 NOTE — ADT AUTH CERT
--              See notes     Notes:  -- 2/24/2025 11:56 AM by Tere Fuentes, RN --      Subject: Additional Clinical Information      Influenza A- was treated @ home                  WBC 26.4                  PE- started on Heparin IV Gtt                  IV ATB      Resp tx      Lasix IV      IV pain meds              2/23/25    Last updated by Tere Fuentes, RN on 2/24/2025 1154     Review Status Created By   In Primary Tere Fuentes RN       Review Type Associated Date   -- 2/24/2025      Criteria Review   DATE: 2/23/25  TYPE OF BED: IMCU     RELEVANT BASELINES: (lab values, vitals, o2 amount/delivery, etc.)  RA (notes state may have had O2 PRN), recent flu with Tamiflu @ home     PERTINENT UPDATES:  IMCU, continue oxygen, IV meds     VITALS:  Temp 97.3, HR 47; 53, RR 21; 20; 19, /79, O2 86%; 89%     O2- Vapotherm @ 60, FiO2 90     ABNL/PERTINENT LABS/RADIOLOGY/DIAGNOSTIC STUDIES:  Labs-  Glucose 277; 236, WBC 30.7, RBC 3.98, Hgb 12.3, Hematocrit 36.1, Neutrophils % 95, Lymphocytes % 2, Eosinophils % 0, Neutrophils absolute 29.17, Lymphocytes absolute 0.61, Monocytes absolute 0.92, PTT 87.8     BGs- pH 7.354, pCO2 53.5, pO2 73.8, HCO3 29.8, O2 93.6  BGs- pH 7.399, pCO2 42.9, pO2 161.9, HCO3 26.5, O2 99.4     PHYSICAL EXAM:  General appearance:  alert, cooperative and no distress  Mental Status:  oriented to person, place and time and normal affect  Lungs:  Diminished breath sounds present bilaterally.   Heart:  regular rate and rhythm, no murmur  Abdomen:  soft, nontender, nondistended, normal bowel sounds, no masses, hepatomegaly, splenomegaly  Extremities:  2+ pitting edema in the BLLE   Skin:  no gross lesions, rashes, induration     MD CONSULTS/ASSESSMENT AND PLAN:  Pulmonology-  Acute on chronic hypoxic respite failure, recently discharged on 2 L with activity, now requiring heated high flow nasal cannula  Acute on chronic heart failure preserved EF with diastolic dysfunction,

## 2025-03-16 PROBLEM — J10.1 INFLUENZA A: Status: RESOLVED | Noted: 2025-02-14 | Resolved: 2025-03-16

## 2025-08-07 ENCOUNTER — TRANSCRIBE ORDERS (OUTPATIENT)
Dept: ADMINISTRATIVE | Age: 60
End: 2025-08-07

## 2025-08-07 DIAGNOSIS — T17.500A MUCUS PLUGGING OF BRONCHI: Primary | ICD-10-CM

## (undated) DEVICE — CLEARCUT® SLIT KNIFE DUAL BEVEL 2.75MM ANGLED: Brand: CLEARCUT®

## (undated) DEVICE — CATHETER DIAG AD 6FR L100CM COR GRN HYDRPHLC NYL JR 4 W/O

## (undated) DEVICE — PACK CATARACT SURGI+CARE CUSTOM

## (undated) DEVICE — MARKER,SKIN,WI/RULER AND LABELS: Brand: MEDLINE

## (undated) DEVICE — SYRINGE MEDICAL 3ML CLEAR PLASTIC STANDARD NON CONTROL LUERLOCK TIP DISPOSABLE

## (undated) DEVICE — GOWN,AURORA,NONREINFORCED,LARGE: Brand: MEDLINE

## (undated) DEVICE — STRAP ARMBRD W1.5XL32IN FOAM STR YET SFT W/ HK AND LOOP

## (undated) DEVICE — MICROSURGICAL INSTRUMENT ANTERIOR CHAMBER CANNULA 27GA: Brand: ALCON

## (undated) DEVICE — CATHETER ANGIO JL3.5 0.056 INX6 FRX100 CM THRULUMEN EXPO

## (undated) DEVICE — THE MONARCH® "B" CARTRIDGE IS A SINGLE-USE POLYPROPYLENE CARTRIDGE FOR POSTERIOR CHAMBER IOL DELIVERY: Brand: MONARCH® II

## (undated) DEVICE — CORD,CAUTERY,BIPOLAR,STERILE: Brand: MEDLINE

## (undated) DEVICE — GUIDEWIRE VASC J 3 MM 0.035 INX210 CM FIX COR INQWIRE

## (undated) DEVICE — SWABSTICK MEDICATED 10% POVIDONE IOD PVP TRIPE ANTISEP PREP 3 PER PK

## (undated) DEVICE — SOLUTION IRRIGATION BAL SALT SOLUTION 500 ML STRL BSS

## (undated) DEVICE — TRAY SURG CUST CRD CATH TOLEDO

## (undated) DEVICE — GLOVE SURG SZ 75 CRM LTX FREE POLYISOPRENE POLYMER BEAD ANTI

## (undated) DEVICE — TOWEL,OR,DSP,ST,WHITE,DLX,2/PK,40PK/CS: Brand: MEDLINE

## (undated) DEVICE — GOWN,SIRUS,NONRNF,XLN/XL,20/CS: Brand: MEDLINE

## (undated) DEVICE — GLIDESHEATH SLENDER STAINLESS STEEL KIT: Brand: GLIDESHEATH SLENDER

## (undated) DEVICE — SATINCRESCENT® KNIFE ANGLED BEVEL UP: Brand: SATINCRESCENT®

## (undated) DEVICE — PENCIL ELECSURG BPLR 18 GA DISP